# Patient Record
Sex: FEMALE | Race: BLACK OR AFRICAN AMERICAN | NOT HISPANIC OR LATINO | Employment: FULL TIME | URBAN - METROPOLITAN AREA
[De-identification: names, ages, dates, MRNs, and addresses within clinical notes are randomized per-mention and may not be internally consistent; named-entity substitution may affect disease eponyms.]

---

## 2022-02-05 ENCOUNTER — HOSPITAL ENCOUNTER (EMERGENCY)
Facility: HOSPITAL | Age: 32
Discharge: HOME | End: 2022-02-05
Attending: EMERGENCY MEDICINE
Payer: COMMERCIAL

## 2022-02-05 VITALS
TEMPERATURE: 99 F | RESPIRATION RATE: 18 BRPM | BODY MASS INDEX: 35.85 KG/M2 | HEART RATE: 83 BPM | OXYGEN SATURATION: 98 % | HEIGHT: 64 IN | WEIGHT: 210 LBS | DIASTOLIC BLOOD PRESSURE: 77 MMHG | SYSTOLIC BLOOD PRESSURE: 176 MMHG

## 2022-02-05 DIAGNOSIS — R06.02 SHORTNESS OF BREATH: ICD-10-CM

## 2022-02-05 DIAGNOSIS — J45.901 EXACERBATION OF ASTHMA, UNSPECIFIED ASTHMA SEVERITY, UNSPECIFIED WHETHER PERSISTENT: Primary | ICD-10-CM

## 2022-02-05 LAB
CTP QC/QA: YES
SARS-COV-2 RDRP RESP QL NAA+PROBE: NEGATIVE

## 2022-02-05 PROCEDURE — 99284 PR EMERGENCY DEPT VISIT,LEVEL IV: ICD-10-PCS | Mod: CS,,, | Performed by: PHYSICIAN ASSISTANT

## 2022-02-05 PROCEDURE — 94640 AIRWAY INHALATION TREATMENT: CPT

## 2022-02-05 PROCEDURE — U0002 COVID-19 LAB TEST NON-CDC: HCPCS | Performed by: PHYSICIAN ASSISTANT

## 2022-02-05 PROCEDURE — 25000242 PHARM REV CODE 250 ALT 637 W/ HCPCS: Performed by: PHYSICIAN ASSISTANT

## 2022-02-05 PROCEDURE — 93010 EKG 12-LEAD: ICD-10-PCS | Mod: ,,, | Performed by: STUDENT IN AN ORGANIZED HEALTH CARE EDUCATION/TRAINING PROGRAM

## 2022-02-05 PROCEDURE — 99285 EMERGENCY DEPT VISIT HI MDM: CPT | Mod: 25

## 2022-02-05 PROCEDURE — 93005 ELECTROCARDIOGRAM TRACING: CPT

## 2022-02-05 PROCEDURE — 94760 N-INVAS EAR/PLS OXIMETRY 1: CPT

## 2022-02-05 PROCEDURE — 93010 ELECTROCARDIOGRAM REPORT: CPT | Mod: ,,, | Performed by: STUDENT IN AN ORGANIZED HEALTH CARE EDUCATION/TRAINING PROGRAM

## 2022-02-05 PROCEDURE — 63600175 PHARM REV CODE 636 W HCPCS: Performed by: PHYSICIAN ASSISTANT

## 2022-02-05 PROCEDURE — 99284 EMERGENCY DEPT VISIT MOD MDM: CPT | Mod: CS,,, | Performed by: PHYSICIAN ASSISTANT

## 2022-02-05 RX ORDER — ALBUTEROL SULFATE 90 UG/1
AEROSOL, METERED RESPIRATORY (INHALATION)
COMMUNITY
Start: 2021-09-17 | End: 2022-02-05 | Stop reason: SDUPTHER

## 2022-02-05 RX ORDER — PREDNISONE 20 MG/1
40 TABLET ORAL DAILY
Qty: 8 TABLET | Refills: 0 | Status: SHIPPED | OUTPATIENT
Start: 2022-02-06 | End: 2022-02-10

## 2022-02-05 RX ORDER — ALBUTEROL SULFATE 90 UG/1
1-2 AEROSOL, METERED RESPIRATORY (INHALATION) EVERY 4 HOURS PRN
Qty: 18 G | Refills: 2 | Status: SHIPPED | OUTPATIENT
Start: 2022-02-05 | End: 2022-12-05 | Stop reason: SDUPTHER

## 2022-02-05 RX ORDER — ALBUTEROL SULFATE 2.5 MG/.5ML
2.5 SOLUTION RESPIRATORY (INHALATION) EVERY 4 HOURS PRN
Qty: 1 EACH | Refills: 2 | Status: ON HOLD | OUTPATIENT
Start: 2022-02-05 | End: 2022-02-24 | Stop reason: SDUPTHER

## 2022-02-05 RX ORDER — IPRATROPIUM BROMIDE AND ALBUTEROL SULFATE 2.5; .5 MG/3ML; MG/3ML
3 SOLUTION RESPIRATORY (INHALATION)
Status: COMPLETED | OUTPATIENT
Start: 2022-02-05 | End: 2022-02-05

## 2022-02-05 RX ORDER — PREDNISONE 20 MG/1
40 TABLET ORAL
Status: COMPLETED | OUTPATIENT
Start: 2022-02-05 | End: 2022-02-05

## 2022-02-05 RX ADMIN — IPRATROPIUM BROMIDE AND ALBUTEROL SULFATE 3 ML: .5; 2.5 SOLUTION RESPIRATORY (INHALATION) at 02:02

## 2022-02-05 RX ADMIN — PREDNISONE 40 MG: 20 TABLET ORAL at 02:02

## 2022-02-05 NOTE — ED TRIAGE NOTES
To the ED from  with c/o SOB for the past week, does have asthma. No relief provided with inhalers.

## 2022-02-05 NOTE — DISCHARGE INSTRUCTIONS
Diagnosis:  Asthma exacerbation    I suspect that your symptoms are due to an exacerbation of your asthma.  Your chest x-ray does not show any signs of pneumonia, your COVID-19 test was negative.  I am prescribing a course of steroids to help with inflammation as well as an inhaler and a nebulizer machine.    Please schedule a follow-up appointment with your primary care doctor.  If you start to have any worsening symptoms, please return to the emergency department.

## 2022-02-05 NOTE — ED NOTES
LOC: The patient is awake, alert, and oriented to self, place, time, and situation. Pt is calm and cooperative. Affect is appropriate.  Speech is appropriate and clear.     APPEARANCE: Patient resting uncomfortably, reporting SOB over the past week, hx asthma, in no acute distress.  Patient is clean and well groomed.    SKIN: The skin is warm and dry; color consistent with ethnicity.  Patient has normal skin turgor and moist mucus membranes.  Skin intact; no breakdown or bruising noted.     MUSCULOSKELETAL: Patient moving upper and lower extremities without difficulty; denies pain in the extremities or back.  Denies weakness.     RESPIRATORY: Airway is open and patent. Respirations spontaneous, even, easy, and non-labored.  Patient has a normal effort and rate.  No accessory muscle use noted. Occasional cough.  SOB with exertion and ambulation reported.     CARDIAC No peripheral edema noted. No complaints of chest pain.      ABDOMEN: Soft and non tender to palpation.  No distention noted. Pt denies abdominal pain; denies nausea, vomiting, diarrhea, or constipation.    NEUROLOGIC: Eyes open spontaneously.  Behavior appropriate to situation.  Follows commands; facial expression symmetrical.  Purposeful motor response noted; normal sensation in all extremities. Pt denies headache; denies lightheadedness or dizziness; denies visual disturbances; denies loss of balance; denies unilateral weakness.

## 2022-02-05 NOTE — Clinical Note
"Molly "Todd Aly was seen and treated in our emergency department on 2/5/2022.     COVID-19 is present in our communities across the state. There is limited testing for COVID at this time, so not all patients can be tested. In this situation, your employee meets the following criteria:    Molly Aly has met the criteria for COVID-19 testing and has a NEGATIVE result. The employee can return to work once they are asymptomatic for 24 hours without the use of fever reducing medications (Tylenol, Motrin, etc).     If the employee is not fully vaccinated and had a close contact:  · Retest at 5 to 7 days post-exposure  · If possible, it is recommended that they quarantine for 5 days from the time of contact regardless of their test status.  · A mask should be worn post quarantine for 5 days.    If you have any questions or concerns, or if I can be of further assistance, please do not hesitate to contact me.    Sincerely,             Alberta Yanes PA-C"

## 2022-02-05 NOTE — ED PROVIDER NOTES
Encounter Date: 2/5/2022       History     Chief Complaint   Patient presents with    Cough     Patient reports cough with shortness of breath x one week. Patient is non-labored in triage. Patient denies chest pain, but does reports shortness of breath is worse on exertion. Patient is A&Ox4 and following commands.      31-year-old female with asthma presents for shortness of breath and cough for 1 week.  She has been using her home inhaler and used up the entire cartridge without significant improvement of her symptoms.  Cough is nonproductive, she reports associated wheezing, chest tightness and chest pain with coughing.  She denies fever/chills, myalgias, leg pain or swelling nausea/vomiting/diarrhea or other complaints.  No sick contacts.  She reports prior hospitalization for asthma many years ago, no history of intubation for asthma.        Review of patient's allergies indicates:  No Known Allergies  Past Medical History:   Diagnosis Date    Asthma      History reviewed. No pertinent surgical history.  History reviewed. No pertinent family history.  Social History     Tobacco Use    Smoking status: Light Tobacco Smoker     Types: Cigars    Smokeless tobacco: Never Used   Substance Use Topics    Alcohol use: Yes    Drug use: Not Currently     Review of Systems   Constitutional: Negative for fever.   HENT: Negative for sore throat.    Respiratory: Positive for cough, chest tightness, shortness of breath and wheezing. Negative for apnea, choking and stridor.    Cardiovascular: Positive for chest pain. Negative for palpitations and leg swelling.   Gastrointestinal: Negative for nausea.   Genitourinary: Negative for dysuria.   Musculoskeletal: Negative for back pain.   Skin: Negative for rash.   Neurological: Negative for weakness.   Hematological: Does not bruise/bleed easily.       Physical Exam     Initial Vitals [02/05/22 1231]   BP Pulse Resp Temp SpO2   (!) 176/77 95 19 99.4 °F (37.4 °C) 100 %      MAP        --         Physical Exam    Nursing note and vitals reviewed.  Constitutional: She appears well-developed and well-nourished.   HENT:   Head: Normocephalic and atraumatic.   Eyes: EOM are normal. Pupils are equal, round, and reactive to light.   Neck: Neck supple.   Normal range of motion.  Cardiovascular: Normal rate, regular rhythm, normal heart sounds and intact distal pulses. Exam reveals no gallop and no friction rub.    No murmur heard.  No lower extremity edema or tenderness   Pulmonary/Chest: No tachypnea. No respiratory distress. She has no decreased breath sounds. She has wheezes. She has no rhonchi. She has no rales. She exhibits no tenderness.   Normal work of breathing diffuse expiratory wheezes, decreased air movement   Musculoskeletal:         General: Normal range of motion.      Cervical back: Normal range of motion and neck supple.     Neurological: She is alert and oriented to person, place, and time.   Skin: Skin is warm and dry.   Psychiatric: She has a normal mood and affect.         ED Course   Procedures  Labs Reviewed   SARS-COV-2 RDRP GENE    Narrative:     This test utilizes isothermal nucleic acid amplification   technology to detect the SARS-CoV-2 RdRp nucleic acid segment.   The analytical sensitivity (limit of detection) is 125 genome   equivalents/mL.   A POSITIVE result implies infection with the SARS-CoV-2 virus;   the patient is presumed to be contagious.     A NEGATIVE result means that SARS-CoV-2 nucleic acids are not   present above the limit of detection. A NEGATIVE result should be   treated as presumptive. It does not rule out the possibility of   COVID-19 and should not be the sole basis for treatment decisions.   If COVID-19 is strongly suspected based on clinical and exposure   history, re-testing using an alternate molecular assay should be   considered.   This test is only for use under the Food and Drug   Administration s Emergency Use Authorization (EUA).    Commercial kits are provided by [employer].   Performance characteristics of the EUA have been independently   verified by [facility] Department of   Pathology and Laboratory Medicine.   _________________________________________________________________   The authorized Fact Sheet for Healthcare Providers and the authorized Fact   Sheet for Patients of the ID NOW COVID-19 are available on the FDA   website:     https://www.fda.gov/media/094761/download  https://www.fda.gov/media/273827/download           EKG Readings: (Independently Interpreted)   Initial Reading: No STEMI. Rhythm: Normal Sinus Rhythm. Heart Rate: 80. Ectopy: No Ectopy. ST Segments: Normal ST Segments. Clinical Impression: Normal Sinus Rhythm       Imaging Results          X-Ray Chest PA And Lateral (Final result)  Result time 02/05/22 15:08:59    Final result by Jai Lancaster MD (02/05/22 15:08:59)                 Impression:      1. No acute cardiopulmonary process.      Electronically signed by: Jai Lancaster MD  Date:    02/05/2022  Time:    15:08             Narrative:    EXAMINATION:  XR CHEST PA AND LATERAL    CLINICAL HISTORY:  Shortness of breath    TECHNIQUE:  PA and lateral views of the chest were performed.    COMPARISON:  None    FINDINGS:  The cardiomediastinal silhouette is not enlarged.  There is no pleural effusion.  The trachea is midline.  The lungs are symmetrically expanded bilaterally without evidence of acute parenchymal process. No large focal consolidation seen.  There is no pneumothorax.  The osseous structures are unremarkable.                                 Medications   predniSONE tablet 40 mg (40 mg Oral Given 2/5/22 1406)   albuterol-ipratropium 2.5 mg-0.5 mg/3 mL nebulizer solution 3 mL (3 mLs Nebulization Given 2/5/22 1402)     Medical Decision Making:   Initial Assessment:   31-year-old female presenting for cough, shortness of breath and wheezing for 1 week.  She is hypertensive 176/77  with otherwise normal vitals.  Differential Diagnosis:   Asthma exacerbation  COVID-19   Pneumonia  Think cardiac cause is unlikely    Independently Interpreted Test(s):   I have ordered and independently interpreted X-rays - see summary below.       <> Summary of X-Ray Reading(s): No no consolidation or pulmonary edema  I have ordered and independently interpreted EKG Reading(s) - see prior notes  Clinical Tests:   Lab Tests: Ordered and Reviewed  Radiological Study: Ordered and Reviewed  Medical Tests: Ordered and Reviewed  ED Management:  COVID-19 test is negative.  Will give duo nebs, prednisone, do chest x-ray and reassess.    Patient reports improvement of her shortness of breath DuoNebs.  Lungs with significantly less wheezing on auscultation.  Chest x-ray without signs of pneumonia.  Will treat for asthma exacerbation with prednisone, DuoNebs and instruct patient to follow up with PCP return to the ED for any worsening symptoms.  Referral information provided for primary care clinics. Stressed the importance of follow-up, strict ED return precautions given.  Patient voiced understanding and is comfortable with discharge.              ED Course as of 02/05/22 1853   Sat Feb 05, 2022   1341 SARS-CoV-2 RNA, Amplification, Qual: Negative [CC]   1514 X-Ray Chest PA And Lateral [CC]      ED Course User Index  [CC] Alberta Yanes PA-C             Clinical Impression:   Final diagnoses:  [R06.02] Shortness of breath  [J45.901] Exacerbation of asthma, unspecified asthma severity, unspecified whether persistent (Primary)          ED Disposition Condition    Discharge Stable        ED Prescriptions     Medication Sig Dispense Start Date End Date Auth. Provider    albuterol (PROVENTIL/VENTOLIN HFA) 90 mcg/actuation inhaler Inhale 1-2 puffs into the lungs every 4 (four) hours as needed for Wheezing or Shortness of Breath. Rescue 18 g 2/5/2022  Alberta Yanes PA-C    albuterol sulfate 2.5 mg/0.5 mL Nebu Take  2.5 mg by nebulization every 4 (four) hours as needed. Rescue 1 each 2/5/2022 2/5/2023 Alberta Yanes PA-C    predniSONE (DELTASONE) 20 MG tablet Take 2 tablets (40 mg total) by mouth once daily. for 4 days 8 tablet 2/6/2022 2/10/2022 Alberta Yanes PA-C        Follow-up Information     Follow up With Specialties Details Why Contact [facility] Family Medicine Family Medicine Schedule an appointment as soon as possible for a visit in 1 week  [address] [facility] [address]  [phone]      [facility] - Emergency Dept Emergency Medicine Go to  If symptoms worsen [address] [facility] [id number] [phone]           Alberta Yanes PA-C  02/05/22 2632

## 2022-02-23 ENCOUNTER — PATIENT OUTREACH (OUTPATIENT)
Dept: EMERGENCY MEDICINE | Facility: HOSPITAL | Age: 32
End: 2022-02-23

## 2022-02-23 ENCOUNTER — HOSPITAL ENCOUNTER (OUTPATIENT)
Facility: HOSPITAL | Age: 32
Discharge: HOME | End: 2022-02-24
Attending: EMERGENCY MEDICINE | Admitting: STUDENT IN AN ORGANIZED HEALTH CARE EDUCATION/TRAINING PROGRAM
Payer: COMMERCIAL

## 2022-02-23 DIAGNOSIS — R07.9 CHEST PAIN: ICD-10-CM

## 2022-02-23 DIAGNOSIS — J45.901 EXACERBATION OF ASTHMA, UNSPECIFIED ASTHMA SEVERITY, UNSPECIFIED WHETHER PERSISTENT: Primary | ICD-10-CM

## 2022-02-23 DIAGNOSIS — I10 HYPERTENSION, UNSPECIFIED TYPE: ICD-10-CM

## 2022-02-23 LAB
ALBUMIN SERPL BCP-MCNC: 4 G/DL (ref 3.5–5.2)
ALP SERPL-CCNC: 62 U/L (ref 55–135)
ALT SERPL W/O P-5'-P-CCNC: 10 U/L (ref 10–44)
ANION GAP SERPL CALC-SCNC: 12 MMOL/L (ref 8–16)
AST SERPL-CCNC: 18 U/L (ref 10–40)
B-HCG UR QL: NEGATIVE
BASOPHILS # BLD AUTO: 0.06 K/UL (ref 0–0.2)
BASOPHILS NFR BLD: 0.7 % (ref 0–1.9)
BILIRUB SERPL-MCNC: 0.5 MG/DL (ref 0.1–1)
BUN SERPL-MCNC: 10 MG/DL (ref 6–20)
CALCIUM SERPL-MCNC: 9.8 MG/DL (ref 8.7–10.5)
CHLORIDE SERPL-SCNC: 104 MMOL/L (ref 95–110)
CO2 SERPL-SCNC: 24 MMOL/L (ref 23–29)
CREAT SERPL-MCNC: 0.8 MG/DL (ref 0.5–1.4)
CTP QC/QA: YES
CTP QC/QA: YES
DIFFERENTIAL METHOD: ABNORMAL
EOSINOPHIL # BLD AUTO: 0.4 K/UL (ref 0–0.5)
EOSINOPHIL NFR BLD: 4.5 % (ref 0–8)
ERYTHROCYTE [DISTWIDTH] IN BLOOD BY AUTOMATED COUNT: 15.6 % (ref 11.5–14.5)
EST. GFR  (AFRICAN AMERICAN): >60 ML/MIN/1.73 M^2
EST. GFR  (NON AFRICAN AMERICAN): >60 ML/MIN/1.73 M^2
GLUCOSE SERPL-MCNC: 118 MG/DL (ref 70–110)
HCT VFR BLD AUTO: 36.1 % (ref 37–48.5)
HGB BLD-MCNC: 11.1 G/DL (ref 12–16)
IMM GRANULOCYTES # BLD AUTO: 0.01 K/UL (ref 0–0.04)
IMM GRANULOCYTES NFR BLD AUTO: 0.1 % (ref 0–0.5)
LYMPHOCYTES # BLD AUTO: 1.8 K/UL (ref 1–4.8)
LYMPHOCYTES NFR BLD: 19.7 % (ref 18–48)
MCH RBC QN AUTO: 25.4 PG (ref 27–31)
MCHC RBC AUTO-ENTMCNC: 30.7 G/DL (ref 32–36)
MCV RBC AUTO: 83 FL (ref 82–98)
MONOCYTES # BLD AUTO: 0.7 K/UL (ref 0.3–1)
MONOCYTES NFR BLD: 7.8 % (ref 4–15)
NEUTROPHILS # BLD AUTO: 6 K/UL (ref 1.8–7.7)
NEUTROPHILS NFR BLD: 67.2 % (ref 38–73)
NRBC BLD-RTO: 0 /100 WBC
PLATELET # BLD AUTO: 264 K/UL (ref 150–450)
PMV BLD AUTO: 10 FL (ref 9.2–12.9)
POTASSIUM SERPL-SCNC: 4.1 MMOL/L (ref 3.5–5.1)
PROT SERPL-MCNC: 8.8 G/DL (ref 6–8.4)
RBC # BLD AUTO: 4.37 M/UL (ref 4–5.4)
SARS-COV-2 RDRP RESP QL NAA+PROBE: NEGATIVE
SODIUM SERPL-SCNC: 140 MMOL/L (ref 136–145)
WBC # BLD AUTO: 8.97 K/UL (ref 3.9–12.7)

## 2022-02-23 PROCEDURE — 94761 N-INVAS EAR/PLS OXIMETRY MLT: CPT

## 2022-02-23 PROCEDURE — 63600175 PHARM REV CODE 636 W HCPCS: Performed by: HOSPITALIST

## 2022-02-23 PROCEDURE — 96366 THER/PROPH/DIAG IV INF ADDON: CPT

## 2022-02-23 PROCEDURE — 80053 COMPREHEN METABOLIC PANEL: CPT | Performed by: EMERGENCY MEDICINE

## 2022-02-23 PROCEDURE — 99220 PR INITIAL OBSERVATION CARE,LEVL III: ICD-10-PCS | Mod: ,,, | Performed by: HOSPITALIST

## 2022-02-23 PROCEDURE — 94640 AIRWAY INHALATION TREATMENT: CPT

## 2022-02-23 PROCEDURE — 99285 EMERGENCY DEPT VISIT HI MDM: CPT | Mod: CS,,, | Performed by: EMERGENCY MEDICINE

## 2022-02-23 PROCEDURE — 96365 THER/PROPH/DIAG IV INF INIT: CPT

## 2022-02-23 PROCEDURE — G0378 HOSPITAL OBSERVATION PER HR: HCPCS

## 2022-02-23 PROCEDURE — 87389 HIV-1 AG W/HIV-1&-2 AB AG IA: CPT | Performed by: EMERGENCY MEDICINE

## 2022-02-23 PROCEDURE — 99900031 HC PATIENT EDUCATION (STAT)

## 2022-02-23 PROCEDURE — 25000242 PHARM REV CODE 250 ALT 637 W/ HCPCS: Performed by: EMERGENCY MEDICINE

## 2022-02-23 PROCEDURE — 81025 URINE PREGNANCY TEST: CPT | Performed by: EMERGENCY MEDICINE

## 2022-02-23 PROCEDURE — 86803 HEPATITIS C AB TEST: CPT | Performed by: EMERGENCY MEDICINE

## 2022-02-23 PROCEDURE — 99285 EMERGENCY DEPT VISIT HI MDM: CPT | Mod: 25

## 2022-02-23 PROCEDURE — 96375 TX/PRO/DX INJ NEW DRUG ADDON: CPT

## 2022-02-23 PROCEDURE — U0002 COVID-19 LAB TEST NON-CDC: HCPCS | Performed by: EMERGENCY MEDICINE

## 2022-02-23 PROCEDURE — 27000221 HC OXYGEN, UP TO 24 HOURS

## 2022-02-23 PROCEDURE — 25000242 PHARM REV CODE 250 ALT 637 W/ HCPCS: Performed by: STUDENT IN AN ORGANIZED HEALTH CARE EDUCATION/TRAINING PROGRAM

## 2022-02-23 PROCEDURE — 25000242 PHARM REV CODE 250 ALT 637 W/ HCPCS: Performed by: HOSPITALIST

## 2022-02-23 PROCEDURE — 99220 PR INITIAL OBSERVATION CARE,LEVL III: CPT | Mod: ,,, | Performed by: HOSPITALIST

## 2022-02-23 PROCEDURE — 25000003 PHARM REV CODE 250: Performed by: HOSPITALIST

## 2022-02-23 PROCEDURE — 85025 COMPLETE CBC W/AUTO DIFF WBC: CPT | Performed by: EMERGENCY MEDICINE

## 2022-02-23 PROCEDURE — 99285 PR EMERGENCY DEPT VISIT,LEVEL V: ICD-10-PCS | Mod: CS,,, | Performed by: EMERGENCY MEDICINE

## 2022-02-23 PROCEDURE — 63600175 PHARM REV CODE 636 W HCPCS: Performed by: EMERGENCY MEDICINE

## 2022-02-23 PROCEDURE — 94760 N-INVAS EAR/PLS OXIMETRY 1: CPT

## 2022-02-23 RX ORDER — IPRATROPIUM BROMIDE 0.5 MG/2.5ML
1 SOLUTION RESPIRATORY (INHALATION)
Status: COMPLETED | OUTPATIENT
Start: 2022-02-23 | End: 2022-02-23

## 2022-02-23 RX ORDER — IBUPROFEN 200 MG
24 TABLET ORAL
Status: DISCONTINUED | OUTPATIENT
Start: 2022-02-23 | End: 2022-02-24 | Stop reason: HOSPADM

## 2022-02-23 RX ORDER — GLUCAGON 1 MG
1 KIT INJECTION
Status: DISCONTINUED | OUTPATIENT
Start: 2022-02-23 | End: 2022-02-24 | Stop reason: HOSPADM

## 2022-02-23 RX ORDER — MAG HYDROX/ALUMINUM HYD/SIMETH 200-200-20
30 SUSPENSION, ORAL (FINAL DOSE FORM) ORAL 4 TIMES DAILY PRN
Status: DISCONTINUED | OUTPATIENT
Start: 2022-02-23 | End: 2022-02-24 | Stop reason: HOSPADM

## 2022-02-23 RX ORDER — FLUTICASONE FUROATE AND VILANTEROL 200; 25 UG/1; UG/1
1 POWDER RESPIRATORY (INHALATION) DAILY
Status: DISCONTINUED | OUTPATIENT
Start: 2022-02-23 | End: 2022-02-24 | Stop reason: HOSPADM

## 2022-02-23 RX ORDER — NALOXONE HCL 0.4 MG/ML
0.02 VIAL (ML) INJECTION
Status: DISCONTINUED | OUTPATIENT
Start: 2022-02-23 | End: 2022-02-24 | Stop reason: HOSPADM

## 2022-02-23 RX ORDER — SODIUM CHLORIDE 0.9 % (FLUSH) 0.9 %
10 SYRINGE (ML) INJECTION EVERY 12 HOURS PRN
Status: DISCONTINUED | OUTPATIENT
Start: 2022-02-23 | End: 2022-02-24 | Stop reason: HOSPADM

## 2022-02-23 RX ORDER — METHYLPREDNISOLONE SOD SUCC 125 MG
125 VIAL (EA) INJECTION
Status: COMPLETED | OUTPATIENT
Start: 2022-02-23 | End: 2022-02-23

## 2022-02-23 RX ORDER — MONTELUKAST SODIUM 10 MG/1
10 TABLET ORAL DAILY
Status: DISCONTINUED | OUTPATIENT
Start: 2022-02-23 | End: 2022-02-24 | Stop reason: HOSPADM

## 2022-02-23 RX ORDER — IBUPROFEN 200 MG
16 TABLET ORAL
Status: DISCONTINUED | OUTPATIENT
Start: 2022-02-23 | End: 2022-02-24 | Stop reason: HOSPADM

## 2022-02-23 RX ORDER — ALBUTEROL SULFATE 2.5 MG/.5ML
15 SOLUTION RESPIRATORY (INHALATION)
Status: COMPLETED | OUTPATIENT
Start: 2022-02-23 | End: 2022-02-23

## 2022-02-23 RX ORDER — ONDANSETRON 2 MG/ML
4 INJECTION INTRAMUSCULAR; INTRAVENOUS EVERY 8 HOURS PRN
Status: DISCONTINUED | OUTPATIENT
Start: 2022-02-23 | End: 2022-02-24 | Stop reason: HOSPADM

## 2022-02-23 RX ORDER — ACETAMINOPHEN 325 MG/1
650 TABLET ORAL EVERY 4 HOURS PRN
Status: DISCONTINUED | OUTPATIENT
Start: 2022-02-23 | End: 2022-02-24 | Stop reason: HOSPADM

## 2022-02-23 RX ORDER — ALBUTEROL SULFATE 2.5 MG/.5ML
2.5 SOLUTION RESPIRATORY (INHALATION) EVERY 4 HOURS PRN
Status: DISCONTINUED | OUTPATIENT
Start: 2022-02-23 | End: 2022-02-24 | Stop reason: HOSPADM

## 2022-02-23 RX ORDER — TALC
6 POWDER (GRAM) TOPICAL NIGHTLY PRN
Status: DISCONTINUED | OUTPATIENT
Start: 2022-02-23 | End: 2022-02-24 | Stop reason: HOSPADM

## 2022-02-23 RX ORDER — MAGNESIUM SULFATE HEPTAHYDRATE 40 MG/ML
2 INJECTION, SOLUTION INTRAVENOUS
Status: COMPLETED | OUTPATIENT
Start: 2022-02-23 | End: 2022-02-23

## 2022-02-23 RX ORDER — IPRATROPIUM BROMIDE AND ALBUTEROL SULFATE 2.5; .5 MG/3ML; MG/3ML
3 SOLUTION RESPIRATORY (INHALATION)
Status: DISCONTINUED | OUTPATIENT
Start: 2022-02-23 | End: 2022-02-24 | Stop reason: HOSPADM

## 2022-02-23 RX ADMIN — PREDNISONE 50 MG: 20 TABLET ORAL at 08:02

## 2022-02-23 RX ADMIN — IPRATROPIUM BROMIDE 1 MG: 0.5 SOLUTION RESPIRATORY (INHALATION) at 03:02

## 2022-02-23 RX ADMIN — ALBUTEROL SULFATE 15 MG: 2.5 SOLUTION RESPIRATORY (INHALATION) at 03:02

## 2022-02-23 RX ADMIN — METHYLPREDNISOLONE SODIUM SUCCINATE 125 MG: 125 INJECTION, POWDER, FOR SOLUTION INTRAMUSCULAR; INTRAVENOUS at 04:02

## 2022-02-23 RX ADMIN — IPRATROPIUM BROMIDE AND ALBUTEROL SULFATE 3 ML: 2.5; .5 SOLUTION RESPIRATORY (INHALATION) at 07:02

## 2022-02-23 RX ADMIN — MAGNESIUM SULFATE IN WATER 2 G: 40 INJECTION, SOLUTION INTRAVENOUS at 04:02

## 2022-02-23 RX ADMIN — MONTELUKAST 10 MG: 10 TABLET, FILM COATED ORAL at 08:02

## 2022-02-23 RX ADMIN — FLUTICASONE FUROATE AND VILANTEROL TRIFENATATE 1 PUFF: 200; 25 POWDER RESPIRATORY (INHALATION) at 11:02

## 2022-02-23 RX ADMIN — IPRATROPIUM BROMIDE AND ALBUTEROL SULFATE 3 ML: 2.5; .5 SOLUTION RESPIRATORY (INHALATION) at 11:02

## 2022-02-23 RX ADMIN — IPRATROPIUM BROMIDE AND ALBUTEROL SULFATE 3 ML: 2.5; .5 SOLUTION RESPIRATORY (INHALATION) at 04:02

## 2022-02-23 NOTE — ED TRIAGE NOTES
Pt c/o SOB x1 month. Audible wheezing heard. Hx of asthma. Took home meds w/o relief. Placed on 2L NC in triage.

## 2022-02-23 NOTE — DISCHARGE INSTRUCTIONS
"Your blood pressure was elevated in the emergency department.  You must follow-up with your primary care doctor for adjustment of your medicine.    You were seen in the emergency department for difficulty breathing related to asthma ("asthma attack" or "asthma exacerbation").    Diagnosis: asthma exacerbation    Return to the emergency department if you have signs of severe difficulty breathing including:   - Breathing too fast  - Not able to say more than 2-3 words at a time without taking a breath  - Blue-collette or gray/dusky color of the face, lips or fingers  - Muscles pulling in around the neck or ribs    Return to the emergency department at any time if you think that you are getting worse.    Tests today showed:   Labs Reviewed   HIV 1 / 2 ANTIBODY   HEPATITIS C ANTIBODY     Imaging Results    None         Treatments you had today:   Medications - No data to display    Home Care Instructions:  - Take albuterol (inhaler 2 - 4 puffs or nebulizer) every 4 hours as needed for wheezing or difficulty breathing  - Take the prescribed steroid as directed  - Continue taking other home medications as previously prescribed    Follow-Up Plan:  - Follow-up with: Primary care doctor within 2 - 3 days  - Additional outpatient testing and/or evaluation as directed by your doctor  "

## 2022-02-23 NOTE — ASSESSMENT & PLAN NOTE
-likely triggered by environmental dust as patient's childhood asthma were well controlled until after December when she moved to a new apartment and having renovation construction   -discharged from ED on 2/5 on short course of steroid and albuterol inhaler   -returned to ED last night with sudden onset chest tightness, sob and wheezing   -clinically improved with IV solumedrol, nebs and magnesium   -oxygenating well on 2 Liter and not in distress   -will start on prednisone, nebs and singulair   -advised to avoid environmental triggers that might exacerbate asthma   -needs advair and albuterol inhaler at discharge   -need outpatient referral for community clinic at discharge as patient does not have PCP

## 2022-02-23 NOTE — SUBJECTIVE & OBJECTIVE
Past Medical History:   Diagnosis Date    Asthma        History reviewed. No pertinent surgical history.    Review of patient's allergies indicates:  No Known Allergies    No current facility-administered medications on file prior to encounter.     Current Outpatient Medications on File Prior to Encounter   Medication Sig    albuterol (PROVENTIL/VENTOLIN HFA) 90 mcg/actuation inhaler Inhale 1-2 puffs into the lungs every 4 (four) hours as needed for Wheezing or Shortness of Breath. Rescue    albuterol sulfate 2.5 mg/0.5 mL Nebu Take 2.5 mg by nebulization every 4 (four) hours as needed. Rescue     Family History    None       Tobacco Use    Smoking status: Light Tobacco Smoker     Types: Cigars    Smokeless tobacco: Never Used   Substance and Sexual Activity    Alcohol use: Yes    Drug use: Not Currently    Sexual activity: Not on file     Review of Systems   Constitutional:  Negative for activity change, appetite change, chills, diaphoresis, fatigue and fever.   HENT:  Negative for congestion, dental problem, drooling, ear discharge, ear pain, facial swelling, hearing loss, mouth sores, nosebleeds, postnasal drip, rhinorrhea, sinus pressure, sneezing, sore throat, tinnitus, trouble swallowing and voice change.    Eyes:  Negative for photophobia, pain, discharge, redness, itching and visual disturbance.   Respiratory:  Positive for cough, shortness of breath and wheezing. Negative for apnea, choking, chest tightness and stridor.    Cardiovascular:  Negative for chest pain, palpitations and leg swelling.   Gastrointestinal:  Negative for abdominal distention, abdominal pain, anal bleeding, blood in stool, constipation, diarrhea, nausea, rectal pain and vomiting.   Endocrine: Negative for cold intolerance, heat intolerance, polydipsia, polyphagia and polyuria.   Genitourinary:  Negative for decreased urine volume, difficulty urinating, dyspareunia, dysuria, enuresis, flank pain, frequency, genital sores, hematuria,  menstrual problem, pelvic pain, urgency, vaginal bleeding, vaginal discharge and vaginal pain.   Musculoskeletal:  Negative for arthralgias, back pain, gait problem, joint swelling, myalgias, neck pain and neck stiffness.   Skin:  Negative for color change, pallor, rash and wound.   Allergic/Immunologic: Negative for environmental allergies, food allergies and immunocompromised state.   Neurological:  Negative for dizziness, tremors, seizures, syncope, facial asymmetry, speech difficulty, weakness, light-headedness, numbness and headaches.   Hematological:  Negative for adenopathy. Does not bruise/bleed easily.   Psychiatric/Behavioral:  Negative for agitation, behavioral problems, confusion, decreased concentration, dysphoric mood, hallucinations, self-injury, sleep disturbance and suicidal ideas. The patient is not nervous/anxious and is not hyperactive.    Objective:     Vital Signs (Most Recent):  Temp: 99.3 °F (37.4 °C) (02/23/22 0052)  Pulse: 105 (02/23/22 0338)  Resp: (!) 22 (02/23/22 0338)  BP: (!) 147/118 (02/23/22 0052)  SpO2: 99 % (02/23/22 0338)   Vital Signs (24h Range):  Temp:  [99.3 °F (37.4 °C)] 99.3 °F (37.4 °C)  Pulse:  [105] 105  Resp:  [22] 22  SpO2:  [95 %-99 %] 99 %  BP: (147)/(118) 147/118        There is no height or weight on file to calculate BMI.    Physical Exam  Constitutional:       General: She is not in acute distress.     Appearance: She is well-developed. She is not diaphoretic.   HENT:      Head: Normocephalic and atraumatic.      Right Ear: External ear normal.      Left Ear: External ear normal.      Nose: Nose normal.      Mouth/Throat:      Pharynx: No oropharyngeal exudate.   Eyes:      General: No scleral icterus.     Extraocular Movements: EOM normal.      Conjunctiva/sclera: Conjunctivae normal.      Pupils: Pupils are equal, round, and reactive to light.   Neck:      Thyroid: No thyromegaly.      Vascular: No JVD.      Trachea: No tracheal deviation.   Cardiovascular:       Rate and Rhythm: Normal rate and regular rhythm.      Heart sounds: Normal heart sounds. No murmur heard.    No gallop.   Pulmonary:      Effort: Pulmonary effort is normal. No respiratory distress.      Breath sounds: Wheezing (bialteral expiratory wheezing) present. No rales.   Chest:      Chest wall: No tenderness.   Abdominal:      General: Bowel sounds are normal. There is no distension.      Palpations: Abdomen is soft. There is no mass.      Tenderness: There is no abdominal tenderness. There is no guarding or rebound.   Genitourinary:     Vagina: No vaginal discharge.   Musculoskeletal:         General: No tenderness.      Cervical back: Neck supple.   Lymphadenopathy:      Cervical: No cervical adenopathy.   Skin:     General: Skin is warm and dry.      Coloration: Skin is not pale.      Findings: No erythema or rash.   Neurological:      Mental Status: She is alert and oriented to person, place, and time.      Cranial Nerves: No cranial nerve deficit.      Motor: No abnormal muscle tone.      Coordination: Coordination normal.      Deep Tendon Reflexes: Reflexes are normal and symmetric. Reflexes normal.   Psychiatric:         Behavior: Behavior normal.         Thought Content: Thought content normal.         Judgment: Judgment normal.         CRANIAL NERVES     CN III, IV, VI   Pupils are equal, round, and reactive to light.  Extraocular motions are normal.      Significant Labs:   Recent Results (from the past 24 hour(s))   CBC auto differential    Collection Time: 02/23/22  3:34 AM   Result Value Ref Range    WBC 8.97 3.90 - 12.70 K/uL    RBC 4.37 4.00 - 5.40 M/uL    Hemoglobin 11.1 (L) 12.0 - 16.0 g/dL    Hematocrit 36.1 (L) 37.0 - 48.5 %    MCV 83 82 - 98 fL    MCH 25.4 (L) 27.0 - 31.0 pg    MCHC 30.7 (L) 32.0 - 36.0 g/dL    RDW 15.6 (H) 11.5 - 14.5 %    Platelets 264 150 - 450 K/uL    MPV 10.0 9.2 - 12.9 fL    Immature Granulocytes 0.1 0.0 - 0.5 %    Gran # (ANC) 6.0 1.8 - 7.7 K/uL    Immature Grans  (Abs) 0.01 0.00 - 0.04 K/uL    Lymph # 1.8 1.0 - 4.8 K/uL    Mono # 0.7 0.3 - 1.0 K/uL    Eos # 0.4 0.0 - 0.5 K/uL    Baso # 0.06 0.00 - 0.20 K/uL    nRBC 0 0 /100 WBC    Gran % 67.2 38.0 - 73.0 %    Lymph % 19.7 18.0 - 48.0 %    Mono % 7.8 4.0 - 15.0 %    Eosinophil % 4.5 0.0 - 8.0 %    Basophil % 0.7 0.0 - 1.9 %    Differential Method Automated    Comprehensive metabolic panel    Collection Time: 02/23/22  3:34 AM   Result Value Ref Range    Sodium 140 136 - 145 mmol/L    Potassium 4.1 3.5 - 5.1 mmol/L    Chloride 104 95 - 110 mmol/L    CO2 24 23 - 29 mmol/L    Glucose 118 (H) 70 - 110 mg/dL    BUN 10 6 - 20 mg/dL    Creatinine 0.8 0.5 - 1.4 mg/dL    Calcium 9.8 8.7 - 10.5 mg/dL    Total Protein 8.8 (H) 6.0 - 8.4 g/dL    Albumin 4.0 3.5 - 5.2 g/dL    Total Bilirubin 0.5 0.1 - 1.0 mg/dL    Alkaline Phosphatase 62 55 - 135 U/L    AST 18 10 - 40 U/L    ALT 10 10 - 44 U/L    Anion Gap 12 8 - 16 mmol/L    eGFR if African American >60.0 >60 mL/min/1.73 m^2    eGFR if non African American >60.0 >60 mL/min/1.73 m^2   POCT COVID-19 Rapid Screening    Collection Time: 02/23/22  3:53 AM   Result Value Ref Range    POC Rapid COVID Negative Negative     Acceptable Yes    POCT urine pregnancy    Collection Time: 02/23/22  5:32 AM   Result Value Ref Range    POC Preg Test, Ur Negative Negative     Acceptable Yes          Significant Imaging: I have reviewed all pertinent imaging results/findings within the past 24 hours.

## 2022-02-23 NOTE — HPI
Ms. Aly is a 31 y.o. female with history of childhood asthma presenting to emergency department with complaint of shortness of breath.      Per chart review, patient seen in this emergency department on 02/05/2022 with complaint of shortness of breath and cough. She was sent home on short course of steroid, albuterol inhaler and advised to follow up with PCP. Patient states she felt better for few days after going home. Patient reports sudden onset chest tightness, wheezing and SOB last night which prompted her to come to ED. Patient states her childhood asthma has been well controlled until few weeks ago when she started with asthma flare ups. Patient thinks recent renovation construction and outside her apartment likely contributed to her recent asthma exacerbation. Patient notes she moved current apartment in December and a month later she started having asthma symptoms. She denies any other changes. Denies fever, chills, cough, chest pain, palpitation, N/V/D/abdominal pain, urinary complaints, focal weakness or numbness. She denies tobacco, alcohol or other illicit drug use.  She does not have a primary care doctor. 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      Patient received nebs, solumedrol and magnesium in ED with some clinical improvement.

## 2022-02-23 NOTE — H&P
[facility] Emergency [facility]  [facility]  History & Physical    Patient Name: Molly Aly  MRN: [id number]  Patient Class: OP- Observation  Admission Date: 2/23/2022  Attending Physician: Ailyn Ingram MD   Primary Care Provider: Primary Doctor No         Patient information was obtained from patient and ER records.     Subjective:     Principal Problem:Moderate asthma with acute exacerbation    Chief Complaint:   Chief Complaint   Patient presents with    Shortness of Breath     Hx of asthma. Recently given home meds to help but not having any relief.         HPI: Ms. Aly is a 31 y.o. female with history of childhood asthma presenting to emergency department with complaint of shortness of breath.      Per chart review, patient seen in this emergency department on 02/05/2022 with complaint of shortness of breath and cough. She was sent home on short course of steroid, albuterol inhaler and advised to follow up with PCP. Patient states she felt better for few days after going home. Patient reports sudden onset chest tightness, wheezing and SOB last night which prompted her to come to ED. Patient states her childhood asthma has been well controlled until few weeks ago when she started with asthma flare ups. Patient thinks recent renovation construction and outside her apartment likely contributed to her recent asthma exacerbation. Patient notes she moved current apartment in December and a month later she started having asthma symptoms. She denies any other changes. Denies fever, chills, cough, chest pain, palpitation, N/V/D/abdominal pain, urinary complaints, focal weakness or numbness. She denies tobacco, alcohol or other illicit drug use.  She does not have a primary care doctor. Patient works as hotel security.      Patient received nebs, solumedrol and magnesium in ED with some clinical improvement.         Past Medical History:   Diagnosis Date    Asthma        History reviewed. No pertinent surgical  history.    Review of patient's allergies indicates:  No Known Allergies    No current facility-administered medications on file prior to encounter.     Current Outpatient Medications on File Prior to Encounter   Medication Sig    albuterol (PROVENTIL/VENTOLIN HFA) 90 mcg/actuation inhaler Inhale 1-2 puffs into the lungs every 4 (four) hours as needed for Wheezing or Shortness of Breath. Rescue    albuterol sulfate 2.5 mg/0.5 mL Nebu Take 2.5 mg by nebulization every 4 (four) hours as needed. Rescue     Family History    None       Tobacco Use    Smoking status: Light Tobacco Smoker     Types: Cigars    Smokeless tobacco: Never Used   Substance and Sexual Activity    Alcohol use: Yes    Drug use: Not Currently    Sexual activity: Not on file     Review of Systems   Constitutional:  Negative for activity change, appetite change, chills, diaphoresis, fatigue and fever.   HENT:  Negative for congestion, dental problem, drooling, ear discharge, ear pain, facial swelling, hearing loss, mouth sores, nosebleeds, postnasal drip, rhinorrhea, sinus pressure, sneezing, sore throat, tinnitus, trouble swallowing and voice change.    Eyes:  Negative for photophobia, pain, discharge, redness, itching and visual disturbance.   Respiratory:  Positive for cough, shortness of breath and wheezing. Negative for apnea, choking, chest tightness and stridor.    Cardiovascular:  Negative for chest pain, palpitations and leg swelling.   Gastrointestinal:  Negative for abdominal distention, abdominal pain, anal bleeding, blood in stool, constipation, diarrhea, nausea, rectal pain and vomiting.   Endocrine: Negative for cold intolerance, heat intolerance, polydipsia, polyphagia and polyuria.   Genitourinary:  Negative for decreased urine volume, difficulty urinating, dyspareunia, dysuria, enuresis, flank pain, frequency, genital sores, hematuria, menstrual problem, pelvic pain, urgency, vaginal bleeding, vaginal discharge and vaginal  pain.   Musculoskeletal:  Negative for arthralgias, back pain, gait problem, joint swelling, myalgias, neck pain and neck stiffness.   Skin:  Negative for color change, pallor, rash and wound.   Allergic/Immunologic: Negative for environmental allergies, food allergies and immunocompromised state.   Neurological:  Negative for dizziness, tremors, seizures, syncope, facial asymmetry, speech difficulty, weakness, light-headedness, numbness and headaches.   Hematological:  Negative for adenopathy. Does not bruise/bleed easily.   Psychiatric/Behavioral:  Negative for agitation, behavioral problems, confusion, decreased concentration, dysphoric mood, hallucinations, self-injury, sleep disturbance and suicidal ideas. The patient is not nervous/anxious and is not hyperactive.    Objective:     Vital Signs (Most Recent):  Temp: 99.3 °F (37.4 °C) (02/23/22 0052)  Pulse: 105 (02/23/22 0338)  Resp: (!) 22 (02/23/22 0338)  BP: (!) 147/118 (02/23/22 0052)  SpO2: 99 % (02/23/22 0338)   Vital Signs (24h Range):  Temp:  [99.3 °F (37.4 °C)] 99.3 °F (37.4 °C)  Pulse:  [105] 105  Resp:  [22] 22  SpO2:  [95 %-99 %] 99 %  BP: (147)/(118) 147/118        There is no height or weight on file to calculate BMI.    Physical Exam  Constitutional:       General: She is not in acute distress.     Appearance: She is well-developed. She is not diaphoretic.   HENT:      Head: Normocephalic and atraumatic.      Right Ear: External ear normal.      Left Ear: External ear normal.      Nose: Nose normal.      Mouth/Throat:      Pharynx: No oropharyngeal exudate.   Eyes:      General: No scleral icterus.     Extraocular Movements: EOM normal.      Conjunctiva/sclera: Conjunctivae normal.      Pupils: Pupils are equal, round, and reactive to light.   Neck:      Thyroid: No thyromegaly.      Vascular: No JVD.      Trachea: No tracheal deviation.   Cardiovascular:      Rate and Rhythm: Normal rate and regular rhythm.      Heart sounds: Normal heart sounds.  No murmur heard.    No gallop.   Pulmonary:      Effort: Pulmonary effort is normal. No respiratory distress.      Breath sounds: Wheezing (bialteral expiratory wheezing) present. No rales.   Chest:      Chest wall: No tenderness.   Abdominal:      General: Bowel sounds are normal. There is no distension.      Palpations: Abdomen is soft. There is no mass.      Tenderness: There is no abdominal tenderness. There is no guarding or rebound.   Genitourinary:     Vagina: No vaginal discharge.   Musculoskeletal:         General: No tenderness.      Cervical back: Neck supple.   Lymphadenopathy:      Cervical: No cervical adenopathy.   Skin:     General: Skin is warm and dry.      Coloration: Skin is not pale.      Findings: No erythema or rash.   Neurological:      Mental Status: She is alert and oriented to person, place, and time.      Cranial Nerves: No cranial nerve deficit.      Motor: No abnormal muscle tone.      Coordination: Coordination normal.      Deep Tendon Reflexes: Reflexes are normal and symmetric. Reflexes normal.   Psychiatric:         Behavior: Behavior normal.         Thought Content: Thought content normal.         Judgment: Judgment normal.         CRANIAL NERVES     CN III, IV, VI   Pupils are equal, round, and reactive to light.  Extraocular motions are normal.      Significant Labs:   Recent Results (from the past 24 hour(s))   CBC auto differential    Collection Time: 02/23/22  3:34 AM   Result Value Ref Range    WBC 8.97 3.90 - 12.70 K/uL    RBC 4.37 4.00 - 5.40 M/uL    Hemoglobin 11.1 (L) 12.0 - 16.0 g/dL    Hematocrit 36.1 (L) 37.0 - 48.5 %    MCV 83 82 - 98 fL    MCH 25.4 (L) 27.0 - 31.0 pg    MCHC 30.7 (L) 32.0 - 36.0 g/dL    RDW 15.6 (H) 11.5 - 14.5 %    Platelets 264 150 - 450 K/uL    MPV 10.0 9.2 - 12.9 fL    Immature Granulocytes 0.1 0.0 - 0.5 %    Gran # (ANC) 6.0 1.8 - 7.7 K/uL    Immature Grans (Abs) 0.01 0.00 - 0.04 K/uL    Lymph # 1.8 1.0 - 4.8 K/uL    Mono # 0.7 0.3 - 1.0 K/uL     Eos # 0.4 0.0 - 0.5 K/uL    Baso # 0.06 0.00 - 0.20 K/uL    nRBC 0 0 /100 WBC    Gran % 67.2 38.0 - 73.0 %    Lymph % 19.7 18.0 - 48.0 %    Mono % 7.8 4.0 - 15.0 %    Eosinophil % 4.5 0.0 - 8.0 %    Basophil % 0.7 0.0 - 1.9 %    Differential Method Automated    Comprehensive metabolic panel    Collection Time: 02/23/22  3:34 AM   Result Value Ref Range    Sodium 140 136 - 145 mmol/L    Potassium 4.1 3.5 - 5.1 mmol/L    Chloride 104 95 - 110 mmol/L    CO2 24 23 - 29 mmol/L    Glucose 118 (H) 70 - 110 mg/dL    BUN 10 6 - 20 mg/dL    Creatinine 0.8 0.5 - 1.4 mg/dL    Calcium 9.8 8.7 - 10.5 mg/dL    Total Protein 8.8 (H) 6.0 - 8.4 g/dL    Albumin 4.0 3.5 - 5.2 g/dL    Total Bilirubin 0.5 0.1 - 1.0 mg/dL    Alkaline Phosphatase 62 55 - 135 U/L    AST 18 10 - 40 U/L    ALT 10 10 - 44 U/L    Anion Gap 12 8 - 16 mmol/L    eGFR if African American >60.0 >60 mL/min/1.73 m^2    eGFR if non African American >60.0 >60 mL/min/1.73 m^2   POCT COVID-19 Rapid Screening    Collection Time: 02/23/22  3:53 AM   Result Value Ref Range    POC Rapid COVID Negative Negative     Acceptable Yes    POCT urine pregnancy    Collection Time: 02/23/22  5:32 AM   Result Value Ref Range    POC Preg Test, Ur Negative Negative     Acceptable Yes          Significant Imaging: I have reviewed all pertinent imaging results/findings within the past 24 hours.    Assessment/Plan:     * Moderate asthma with acute exacerbation  -likely triggered by environmental dust as patient's childhood asthma were well controlled until after December when she moved to a new apartment and having renovation construction   -discharged from ED on 2/5 on short course of steroid and albuterol inhaler   -returned to ED last night with sudden onset chest tightness, sob and wheezing   -clinically improved with IV solumedrol, nebs and magnesium   -oxygenating well on 2 Liter and not in distress   -will start on prednisone, nebs and singulair    -advised to avoid environmental triggers that might exacerbate asthma   -needs advair and albuterol inhaler at discharge   -need outpatient referral for community clinic at discharge as patient does not have PCP       VTE Risk Mitigation (From admission, onward)         Ordered     IP VTE HIGH RISK PATIENT  Once         02/23/22 0558     Place sequential compression device  Until discontinued         02/23/22 0558                   Aicha Garcia, DO  Department of Hospital Medicine   [facility] - Emergency Dept

## 2022-02-23 NOTE — ED PROVIDER NOTES
Source of History:  Patient  Chart    Chief complaint:  Shortness of Breath (Hx of asthma. Recently given home meds to help but not having any relief. )      HPI:  Molly Aly is a 31 y.o. female with history of asthma presenting to emergency department with complaint of shortness of breath.    Per chart review, patient seen in this emergency department on 02/05/2022 with complaint of shortness of breath and cough.  She had used up in entire albuterol inhaler MDI without significant improvement of her symptoms.  She has nonischemic EKG, and a normal chest x-ray.  She was hypertensive but otherwise had normal vitals.  She had improvement in her short notice of breath with DuoNebs.  Her COVID test was negative.  She was discharged with prescriptions for albuterol, and a short steroid burst.    Patient states that she transiently felt mildly improved with the steroid burst, however as soon as she finished her steroid she began to feel very short of breath again.  States that she used up the entire albuterol MDI, as well as the albuterol nebs that she was given.  She has an ongoing cough without fever or sputum production.  No chest pain.  No abdominal pain or vomiting.  She has no history of previous hospital admissions or intubations for asthma.  She does not have a primary care doctor.    ROS: As per HPI and below:  Review of Systems   Constitutional: Negative for fever.   HENT: Negative for sore throat.    Eyes: Negative for double vision.   Respiratory: Positive for cough, shortness of breath and wheezing. Negative for sputum production.    Cardiovascular: Negative for chest pain.   Gastrointestinal: Negative for abdominal pain and vomiting.   Genitourinary: Negative for dysuria.   Musculoskeletal: Negative for falls.   Skin: Negative for rash.   Neurological: Negative for headaches.       Review of patient's allergies indicates:  No Known Allergies    No current facility-administered medications on file prior  to encounter.     Current Outpatient Medications on File Prior to Encounter   Medication Sig Dispense Refill    albuterol (PROVENTIL/VENTOLIN HFA) 90 mcg/actuation inhaler Inhale 1-2 puffs into the lungs every 4 (four) hours as needed for Wheezing or Shortness of Breath. Rescue 18 g 2    albuterol sulfate 2.5 mg/0.5 mL Nebu Take 2.5 mg by nebulization every 4 (four) hours as needed. Rescue 1 each 2       PMH:  As per HPI and below:  Past Medical History:   Diagnosis Date    Asthma      History reviewed. No pertinent surgical history.    Social History     Socioeconomic History    Marital status: Single   Tobacco Use    Smoking status: Light Tobacco Smoker     Types: Cigars    Smokeless tobacco: Never Used   Substance and Sexual Activity    Alcohol use: Yes    Drug use: Not Currently       History reviewed. No pertinent family history.    Physical Exam:      Vitals:    02/23/22 0338   BP:    Pulse: 105   Resp: (!) 22   Temp:      Gen:  Moderate respiratory distress.  Nontoxic.  Mental Status:  Alert and oriented.  Appropriate, conversant.  Skin: Warm, dry. No rashes seen.  Eyes: No conjunctival injection.  Pulm:  Diffuse expiratory wheezing, diminished air entry in all fields.  Conversationally dyspneic, tachypneic, hypoxic, satting well on nasal cannula.  Increased work of breathing, sitting upright, with accessory muscle use.  CV: Regular rate. Regular rhythm.   Abd: Soft.  Not distended.  Nontender.   MSK: Good range of motion all joints.  No deformities.    Neuro: Awake. Speech normal. No focal neuro deficit observed.    Laboratory Studies:  Labs Reviewed   CBC W/ AUTO DIFFERENTIAL - Abnormal; Notable for the following components:       Result Value    Hemoglobin 11.1 (*)     Hematocrit 36.1 (*)     MCH 25.4 (*)     MCHC 30.7 (*)     RDW 15.6 (*)     All other components within normal limits   COMPREHENSIVE METABOLIC PANEL - Abnormal; Notable for the following components:    Glucose 118 (*)     Total  Protein 8.8 (*)     All other components within normal limits   HIV 1 / 2 ANTIBODY   HEPATITIS C ANTIBODY   SARS-COV-2 RDRP GENE   POCT URINE PREGNANCY     Chart reviewed.  See summary in HPI    Imaging Results    None         Medications Given:  Medications   magnesium sulfate 2g in water 50mL IVPB (premix) (2 g Intravenous New Bag 2/23/22 5083)   albuterol sulfate nebulizer solution 15 mg (15 mg Nebulization Given 2/23/22 5237)   ipratropium 0.02 % nebulizer solution 1 mg (1 mg Nebulization Given 2/23/22 7158)   methylPREDNISolone sodium succinate injection 125 mg (125 mg Intravenous Given 2/23/22 0290)       Discussed with: IM    MDM:    31 y.o. female with history of childhood asthma presenting to ER with complaint of 2 weeks of ongoing shortness of breath and wheezing.  She is afebrile, tachycardic, hypoxic, conversationally dyspneic, sitting upright with moderate accessory muscle use.    She received a 15 and 1 DuoNeb, magnesium, and steroids.  Given the chronicity of his complaint, and her initial work of breathing, I placed her in observation to Internal Medicine for further treatment.    5:51 AM  Patient reassessed, significant for movement with 1st DuoNeb, no longer hypoxic but remains tachypneic, moderately conversationally dyspneic, with expiratory wheezing.  Patient placed on scheduled nebs.    Diagnostic Impression:    1. Exacerbation of asthma, unspecified asthma severity, unspecified whether persistent    2. Hypertension, unspecified type         ED Disposition Condition    Observation              Patient understands the plan and is in agreement, verbalized understanding, questions answered    Jessica Caballero MD  Emergency Medicine       Jessica Caballero MD  02/23/22 8503

## 2022-02-24 VITALS
HEIGHT: 63 IN | HEART RATE: 87 BPM | TEMPERATURE: 98 F | DIASTOLIC BLOOD PRESSURE: 90 MMHG | SYSTOLIC BLOOD PRESSURE: 128 MMHG | OXYGEN SATURATION: 99 % | WEIGHT: 218 LBS | RESPIRATION RATE: 18 BRPM | BODY MASS INDEX: 38.62 KG/M2

## 2022-02-24 LAB
HCV AB SERPL QL IA: NEGATIVE
HIV 1+2 AB+HIV1 P24 AG SERPL QL IA: NEGATIVE

## 2022-02-24 PROCEDURE — 25000003 PHARM REV CODE 250: Performed by: HOSPITALIST

## 2022-02-24 PROCEDURE — 99900031 HC PATIENT EDUCATION (STAT)

## 2022-02-24 PROCEDURE — 94640 AIRWAY INHALATION TREATMENT: CPT

## 2022-02-24 PROCEDURE — G0378 HOSPITAL OBSERVATION PER HR: HCPCS

## 2022-02-24 PROCEDURE — 99217 PR OBSERVATION CARE DISCHARGE: ICD-10-PCS | Mod: ,,, | Performed by: STUDENT IN AN ORGANIZED HEALTH CARE EDUCATION/TRAINING PROGRAM

## 2022-02-24 PROCEDURE — 25000242 PHARM REV CODE 250 ALT 637 W/ HCPCS: Performed by: STUDENT IN AN ORGANIZED HEALTH CARE EDUCATION/TRAINING PROGRAM

## 2022-02-24 PROCEDURE — 99406 BEHAV CHNG SMOKING 3-10 MIN: CPT

## 2022-02-24 PROCEDURE — 94761 N-INVAS EAR/PLS OXIMETRY MLT: CPT

## 2022-02-24 PROCEDURE — 25000242 PHARM REV CODE 250 ALT 637 W/ HCPCS: Performed by: HOSPITALIST

## 2022-02-24 PROCEDURE — 63600175 PHARM REV CODE 636 W HCPCS: Performed by: HOSPITALIST

## 2022-02-24 PROCEDURE — 99217 PR OBSERVATION CARE DISCHARGE: CPT | Mod: ,,, | Performed by: STUDENT IN AN ORGANIZED HEALTH CARE EDUCATION/TRAINING PROGRAM

## 2022-02-24 RX ORDER — FLUTICASONE PROPIONATE AND SALMETEROL 250; 50 UG/1; UG/1
1 POWDER RESPIRATORY (INHALATION) 2 TIMES DAILY
Qty: 180 EACH | Refills: 3 | Status: SHIPPED | OUTPATIENT
Start: 2022-02-24 | End: 2023-08-24

## 2022-02-24 RX ORDER — PREDNISONE 50 MG/1
50 TABLET ORAL DAILY
Qty: 4 TABLET | Refills: 0 | Status: SHIPPED | OUTPATIENT
Start: 2022-02-25 | End: 2022-03-01

## 2022-02-24 RX ORDER — ALBUTEROL SULFATE 2.5 MG/.5ML
2.5 SOLUTION RESPIRATORY (INHALATION) EVERY 4 HOURS PRN
Qty: 1 EACH | Refills: 2 | Status: SHIPPED | OUTPATIENT
Start: 2022-02-24 | End: 2022-12-13 | Stop reason: SDUPTHER

## 2022-02-24 RX ORDER — MONTELUKAST SODIUM 10 MG/1
10 TABLET ORAL DAILY
Qty: 30 TABLET | Refills: 3 | Status: SHIPPED | OUTPATIENT
Start: 2022-02-25 | End: 2022-05-26

## 2022-02-24 RX ADMIN — IPRATROPIUM BROMIDE AND ALBUTEROL SULFATE 3 ML: 2.5; .5 SOLUTION RESPIRATORY (INHALATION) at 09:02

## 2022-02-24 RX ADMIN — PREDNISONE 50 MG: 20 TABLET ORAL at 09:02

## 2022-02-24 RX ADMIN — IPRATROPIUM BROMIDE AND ALBUTEROL SULFATE 3 ML: 2.5; .5 SOLUTION RESPIRATORY (INHALATION) at 05:02

## 2022-02-24 RX ADMIN — FLUTICASONE FUROATE AND VILANTEROL TRIFENATATE 1 PUFF: 200; 25 POWDER RESPIRATORY (INHALATION) at 09:02

## 2022-02-24 RX ADMIN — MONTELUKAST 10 MG: 10 TABLET, FILM COATED ORAL at 09:02

## 2022-02-24 RX ADMIN — IPRATROPIUM BROMIDE AND ALBUTEROL SULFATE 3 ML: 2.5; .5 SOLUTION RESPIRATORY (INHALATION) at 01:02

## 2022-02-24 NOTE — PLAN OF CARE
02/24/22 1637   Post-Acute Status   Post-Acute Authorization Other   Other Status No Post-Acute Service Needs     No post-acute needs identified at this time. Pt is expected to discharge home. SW will continue to follow as needed.    SW scheduled Lyft ride  for 5:00PM.    SW let on-call SW know to follow along if there are any issues.     Nery Mcfarlane LMSW  [facility]  Ext. [phone]

## 2022-02-24 NOTE — PLAN OF CARE
To Whom It May Concern,       I have taken care of Molly Aly during her hospitalization 02/23-02/24. Please excuse her from work until- 02/28. She may return to work 02/28.     Take care,         Bharti Tavera MD  Department of Hospital Medicine  Department of Pediatrics  2/24/2022

## 2022-02-24 NOTE — PLAN OF CARE
[facility] - Telemetry Stepdown ([place]-7)  Initial Discharge Assessment       Primary Care Provider: Primary Doctor No    Admission Diagnosis: Chest pain [R07.9]  Hypertension, unspecified type [I10]  Exacerbation of asthma, unspecified asthma severity, unspecified whether persistent [J45.901]    Admission Date: 2/23/2022  Expected Discharge Date: 2/24/2022         Payor: BLUE CROSS BLUE SHIELD / Plan: BCBS ALL OUT OF STATE / Product Type: PPO /     Extended Emergency Contact Information  Primary Emergency Contact: AlySera  Mobile Phone: [phone]  Relation: Mother   needed? No    Discharge Plan A: (P) Home  Discharge Plan B: (P) Home    No Pharmacies Listed           SW completed Discharge Planning Assessment with patient via bedside. Discharge planning booklet given to patient/family and whiteboard updated with GILLIAN and phone #. All questions answered.    Patient reported that she may need assistance with transportation upon discharge.     Patient reported that she lives alone and prior to hospitalization she was independent with her ADL's. Patient reported that she is not on dialysis and she does not go to a Coumadin clinic.    Patient reported that she just changed over to a new insurance plan and she has to find a new PCP.     Patient lives in an apartment complex with stairs to get to her apartment. Patient reported that she does not have difficulty climbing stairs.       Nery Mcfarlane LMSW  [facility]  Ext. [phone]

## 2022-02-24 NOTE — PLAN OF CARE
On Call SW advised by RN that Pt did not make it downstairs in time for her ride. SW rescheduled and advised by RN that Pt is now downstairs waiting for her new ride.     Fatou Fontanez LCSW  Neurocritical Care   [facility]  [id number]

## 2022-02-24 NOTE — PLAN OF CARE
Problem: Asthma Exacerbation  Goal: Asthma Symptom Relief  Outcome: Ongoing, Progressing  Goal: Absence of Hospital-Acquired Illness or Injury  Outcome: Ongoing, Progressing  Goal: Optimal Comfort and Wellbeing  Outcome: Ongoing, Progressing  Goal: Readiness for Transition of Care  Outcome: Ongoing, Progressing

## 2022-02-25 NOTE — PLAN OF CARE
[facility] - Telemetry Stepdown ([facility])  Discharge Final Note    Primary Care Provider: Primary Doctor No    Expected Discharge Date: 2/24/2022    Patient discharged to home via Lyft transportation.     Patient's bedside nurse and patient notified of the above.      Final Discharge Note (most recent)       Final Note - 02/25/22 0922          Final Note    Assessment Type Final Discharge Note (P)      Anticipated Discharge Disposition Home or Self Care (P)         Post-Acute Status    Post-Acute Authorization Other (P)      Other Status No Post-Acute Service Needs (P)                      Important Message from Medicare             Contact Info       [facility] [address]   Specialty: Pulmonology    [address]  [facility] [address]   Phone: [phone]       Next Steps: Schedule an appointment as soon as possible for a visit            Future Appointments   Date Time Provider Department Center   3/3/2022  9:00 AM Griselda Gutierrez MD [facility] PC[facility]        scheduled post-discharge follow-up appointment and information added to AVS.     Nery Mcfarlane LMSW  [facility]  Ext. [phone]

## 2022-03-01 NOTE — DISCHARGE SUMMARY
[facility] [facility]  Discharge Summary      Patient Name: Molly Aly  MRN: [id number]  Patient Class: OP- Observation  Admission Date: 2/23/2022  Hospital Length of Stay: 1 day  Discharge Date and Time:  02/24/2022 11:01 PM  Attending Physician: Addie att. providers found   Discharging Provider: Bharti Tavera MD  Primary Care Provider: Primary Doctor Addie  [facility] Medicine Team: [facility] HOSP MED D Bharti Tavera MD    HPI:   Ms. Aly is a 31 y.o. female with history of childhood asthma presenting to emergency department with complaint of shortness of breath.      Per chart review, patient seen in this emergency department on 02/05/2022 with complaint of shortness of breath and cough. She was sent home on short course of steroid, albuterol inhaler and advised to follow up with PCP. Patient states she felt better for few days after going home. Patient reports sudden onset chest tightness, wheezing and SOB last night which prompted her to come to ED. Patient states her childhood asthma has been well controlled until few weeks ago when she started with asthma flare ups. Patient thinks recent renovation construction and outside her apartment likely contributed to her recent asthma exacerbation. Patient notes she moved current apartment in December and a month later she started having asthma symptoms. She denies any other changes. Denies fever, chills, cough, chest pain, palpitation, N/V/D/abdominal pain, urinary complaints, focal weakness or numbness. She denies tobacco, alcohol or other illicit drug use.  She does not have a primary care doctor. Patient works as hotel security.      Patient received nebs, solumedrol and magnesium in ED with some clinical improvement.     Hospital Course:   Patient had improvement in signs and symptoms. Discussed continuation of prednisone to complete 5d steroid burst. Also discussed continuing albuterol q4h for the next 24hrs, then q6h for  the following 24hrs, and then PRN. Patient was in agreement w/plan. PCP follow-up made. Medications delivered to bedside. Work excuse provided- ok to return to work 02/28/22.    Pt deemed appropriate for discharge. Plan discussed with pt, who was agreeable and amenable; medications were discussed and reviewed, outpatient follow-up scheduled, ER precautions were given, all questions were answered to the pt's satisfaction, and Ms. Aly was subsequently discharged.    Physical Exam  Gen: in NAD, appears stated age  Neuro: AAOx3, motor, sensory, and strength grossly intact BL  HEENT: NTNC, EOMI, PERRL, MMM  CVS: RRR, no m/r/g; S1/S2 auscultated with no S3 or S4; capillary refill < 2 sec  Resp: end exp wheeze appreciated; no belabored breathing or accessory muscle use appreciated   Abd: BS+ in all 4 quadrants; NTND, soft to palpation; no organomegaly appreciated   Extrem: pulses full, equal, and regular over all 4 extremities; no UE or LE edema BL     Goals of Care Treatment Preferences:  Code Status: Full Code    Consults:      Final Active Diagnoses:    Diagnosis Date Noted POA    PRINCIPAL PROBLEM:  Moderate asthma with acute exacerbation [J45.901] 02/23/2022 Yes      Problems Resolved During this Admission:     Discharged Condition: good    Disposition: Home or Self Care    Follow Up:   Follow-up Information     Chance Villanueva - Pulmonary [facility] 9th Fl. Schedule an appointment as soon as possible for a visit .    Specialty: Pulmonology  Contact information:  [address] Villanueva  [facility] [address] [phone]  Additional information:  Main Building, 9th Floor   Please park in [facility] and use Clinic elevator                     Patient Instructions:      Diet Adult Regular     Notify your health care provider if you experience any of the following:  difficulty breathing or increased cough     Notify your health care provider if you experience any of the following:  persistent dizziness,  light-headedness, or visual disturbances     Activity as tolerated       Significant Diagnostic Studies: Labs: CMP No results for input(s): NA, K, CL, CO2, GLU, BUN, CREATININE, CALCIUM, PROT, ALBUMIN, BILITOT, ALKPHOS, AST, ALT, ANIONGAP, ESTGFRAFRICA, EGFRNONAA in the last 48 hours., CBC No results for input(s): WBC, HGB, HCT, PLT in the last 48 hours. and All labs within the past 24 hours have been reviewed  Radiology: X-Ray: none    Medications:  Reconciled Home Medications:      Medication List      START taking these medications    ADVAIR DISKUS 250-50 mcg/dose diskus inhaler  Generic drug: fluticasone-salmeterol 250-50 mcg/dose  Inhale 1 puff into the lungs 2 (two) times daily. Controller     montelukast 10 mg tablet  Commonly known as: SINGULAIR  Take 1 tablet (10 mg total) by mouth once daily.     predniSONE 50 MG Tab  Commonly known as: DELTASONE  Take 1 tablet (50 mg total) by mouth once daily. for 4 days        CONTINUE taking these medications    * albuterol 90 mcg/actuation inhaler  Commonly known as: PROVENTIL/VENTOLIN HFA  Inhale 1-2 puffs into the lungs every 4 (four) hours as needed for Wheezing or Shortness of Breath. Rescue     * albuterol sulfate 2.5 mg/0.5 mL Nebu  Take 2.5 mg by nebulization every 4 (four) hours as needed. Rescue         * This list has 2 medication(s) that are the same as other medications prescribed for you. Read the directions carefully, and ask your doctor or other care provider to review them with you.                Indwelling Lines/Drains at time of discharge:   Lines/Drains/Airways     None                 Time spent on the discharge of patient: 35 minutes           Bharti Tavera MD  Department of Hospital Medicine  [facility] - Telemetry Stepdown ([place]-7)

## 2022-03-01 NOTE — HOSPITAL COURSE
Patient had improvement in signs and symptoms. Discussed continuation of prednisone to complete 5d steroid burst. Also discussed continuing albuterol q4h for the next 24hrs, then q6h for the following 24hrs, and then PRN. Patient was in agreement w/plan. PCP follow-up made. Medications delivered to bedside. Work excuse provided- ok to return to work 02/28/22.    Pt deemed appropriate for discharge. Plan discussed with pt, who was agreeable and amenable; medications were discussed and reviewed, outpatient follow-up scheduled, ER precautions were given, all questions were answered to the pt's satisfaction, and Ms. Aly was subsequently discharged.    Physical Exam  Gen: in NAD, appears stated age  Neuro: AAOx3, motor, sensory, and strength grossly intact BL  HEENT: NTNC, EOMI, PERRL, MMM  CVS: RRR, no m/r/g; S1/S2 auscultated with no S3 or S4; capillary refill < 2 sec  Resp: end exp wheeze appreciated; no belabored breathing or accessory muscle use appreciated   Abd: BS+ in all 4 quadrants; NTND, soft to palpation; no organomegaly appreciated   Extrem: pulses full, equal, and regular over all 4 extremities; no UE or LE edema BL

## 2022-03-03 ENCOUNTER — LAB VISIT (OUTPATIENT)
Dept: LAB | Facility: HOSPITAL | Age: 32
End: 2022-03-03
Payer: COMMERCIAL

## 2022-03-03 ENCOUNTER — OFFICE VISIT (OUTPATIENT)
Dept: INTERNAL MEDICINE | Facility: CLINIC | Age: 32
End: 2022-03-03
Payer: COMMERCIAL

## 2022-03-03 VITALS — HEART RATE: 76 BPM | DIASTOLIC BLOOD PRESSURE: 82 MMHG | SYSTOLIC BLOOD PRESSURE: 118 MMHG

## 2022-03-03 DIAGNOSIS — Z00.00 ENCOUNTER FOR MEDICAL EXAMINATION TO ESTABLISH CARE: Primary | ICD-10-CM

## 2022-03-03 DIAGNOSIS — Z09 HOSPITAL DISCHARGE FOLLOW-UP: ICD-10-CM

## 2022-03-03 DIAGNOSIS — Z23 NEED FOR VACCINATION: ICD-10-CM

## 2022-03-03 DIAGNOSIS — J45.909 ASTHMA, UNSPECIFIED ASTHMA SEVERITY, UNSPECIFIED WHETHER COMPLICATED, UNSPECIFIED WHETHER PERSISTENT: ICD-10-CM

## 2022-03-03 DIAGNOSIS — Z00.00 ENCOUNTER FOR MEDICAL EXAMINATION TO ESTABLISH CARE: ICD-10-CM

## 2022-03-03 LAB
CHOLEST SERPL-MCNC: 164 MG/DL (ref 120–199)
CHOLEST/HDLC SERPL: 2.6 {RATIO} (ref 2–5)
ESTIMATED AVG GLUCOSE: 105 MG/DL (ref 68–131)
HBA1C MFR BLD: 5.3 % (ref 4–5.6)
HDLC SERPL-MCNC: 63 MG/DL (ref 40–75)
HDLC SERPL: 38.4 % (ref 20–50)
LDLC SERPL CALC-MCNC: 92.4 MG/DL (ref 63–159)
NONHDLC SERPL-MCNC: 101 MG/DL
TRIGL SERPL-MCNC: 43 MG/DL (ref 30–150)
TSH SERPL DL<=0.005 MIU/L-ACNC: 2.01 UIU/ML (ref 0.4–4)

## 2022-03-03 PROCEDURE — 84443 ASSAY THYROID STIM HORMONE: CPT | Performed by: STUDENT IN AN ORGANIZED HEALTH CARE EDUCATION/TRAINING PROGRAM

## 2022-03-03 PROCEDURE — 80061 LIPID PANEL: CPT | Performed by: STUDENT IN AN ORGANIZED HEALTH CARE EDUCATION/TRAINING PROGRAM

## 2022-03-03 PROCEDURE — 99385 PR PREVENTIVE VISIT,NEW,18-39: ICD-10-PCS | Mod: S$GLB,,, | Performed by: STUDENT IN AN ORGANIZED HEALTH CARE EDUCATION/TRAINING PROGRAM

## 2022-03-03 PROCEDURE — 99999 PR PBB SHADOW E&M-EST. PATIENT-LVL III: CPT | Mod: PBBFAC,,, | Performed by: STUDENT IN AN ORGANIZED HEALTH CARE EDUCATION/TRAINING PROGRAM

## 2022-03-03 PROCEDURE — 83036 HEMOGLOBIN GLYCOSYLATED A1C: CPT | Performed by: STUDENT IN AN ORGANIZED HEALTH CARE EDUCATION/TRAINING PROGRAM

## 2022-03-03 PROCEDURE — 99385 PREV VISIT NEW AGE 18-39: CPT | Mod: S$GLB,,, | Performed by: STUDENT IN AN ORGANIZED HEALTH CARE EDUCATION/TRAINING PROGRAM

## 2022-03-03 PROCEDURE — 99999 PR PBB SHADOW E&M-EST. PATIENT-LVL III: ICD-10-PCS | Mod: PBBFAC,,, | Performed by: STUDENT IN AN ORGANIZED HEALTH CARE EDUCATION/TRAINING PROGRAM

## 2022-03-03 PROCEDURE — 36415 COLL VENOUS BLD VENIPUNCTURE: CPT | Performed by: STUDENT IN AN ORGANIZED HEALTH CARE EDUCATION/TRAINING PROGRAM

## 2022-03-03 NOTE — PROGRESS NOTES
"INTERNAL MEDICINE RESIDENT CLINIC  CLINIC NOTE    Patient Name: Molly Aly  YOB: 1990    PRESENTING HISTORY       History of Present Illness:  Ms. Molly Aly is a 31 y.o. female w/ a PMHx of asthma with recent asthma exacerbation who presents to [facility]. She was recently admitted 2/23-2/24 for asthma exacerbation and discharged with a 5 day prednisone course and an albuterol regimen. She is now on singulair, albuterol, and advair for maintenance. She recently moved to a new home and has had issues with her asthma since moving. Since hospital discharge she has felt well and reports no problems. Her asthma has been very well controlled on her maintenance inhaler. She has not needed her rescue inhaler or nebulizer since discharge. She denies fevers, chills, CP, chest tightness, FORRESTER, SOB, abdominal pain, N/V/D/C, and weakness.    Annual Exam:    CVD Risk Assessment: http://tools.acc.org/CKVUT-Bwpj-Vxpnizfhq-Plus/#!/calculate/estimate/  Age: 31 y.o..  HTN: no  HLD (age > 40 year): no  Smoker: No  Statin: no  ASA: no    CKD: no  DM (age > 40 years): no  Systemic Inflammation (SLE, RA): no  Metabolic Syndrome: no  (Metabolic syndrome is characterized by the presence of at least three of the following conditions: elevated glucose level, central obesity, low HDL cholesterol level, elevated triglyceride level, and elevated blood pressure)    Depression: PHQ2 0 - 3 for each; "How often have you experienced the following over the past 2 weeks?  Little interest, pleasure while doing things: 0  Feeling down, depressed, hopeless: 0    Thyroid:   History of Thyroid Disease: no    American Thyroid Association and the American Association of Clinical Endocrinologists recommend measuring TSH in individuals at risk for hypothyroidism (personal history of autoimmune disease, neck radiation, or thyroid surgery); and considering screening adults 60 years and older.    Sexual History:   Pt is sexually active " with one male partner.   Uses nothing for protection.   Has recently been tested for STIs.   Has not tested positive for any STIs in the past.   Is not in need of STI testing today.    Screening for Substance Abuse:  Tobacco use:   Cigarette smoker: no, never   Cigar smoker: yes, quit 2 months ago   Vaping: no   Chewing tobacco: no  Alcohol use: 1-2 drinks on special occasions.   Illicit Substance abuse:   Marijuana: occasionally in the past, quit recently.   Cocaine: no   Other Illicit drug use: no  Use of prescriptions not prescribed to patient: no  Vitamins/Supplements/Homeopathic meds/substances: none    Screening for Abuse:  Lives in a Apartment. Does feel safe at home.    Screening for Cancer:   Cervical (every 3 years age 21-65): Last done unknown. Next due at next Gyn appointment.    Vaccines:  Due for the following vaccines: TDAP, Flu, and COVID vaccines. Did not discuss HPV vaccines.  Pt agreed to receive some of the recommended vaccinations (not COVID or Flu).      Review of Systems   Constitutional: Negative for chills, fever, malaise/fatigue and weight loss.   HENT: Negative for congestion, hearing loss, sinus pain and tinnitus.    Eyes: Negative for blurred vision, double vision and redness.   Respiratory: Negative for cough, sputum production and shortness of breath.    Cardiovascular: Negative for chest pain, palpitations, orthopnea and leg swelling.   Gastrointestinal: Negative for abdominal pain, blood in stool, constipation, diarrhea, heartburn, nausea and vomiting.   Genitourinary: Negative for dysuria, frequency and urgency.   Musculoskeletal: Negative for falls, joint pain and myalgias.   Skin: Negative.    Neurological: Negative for dizziness, loss of consciousness, weakness and headaches.   Endo/Heme/Allergies: Does not bruise/bleed easily.   Psychiatric/Behavioral: Negative for depression and suicidal ideas. The patient is not nervous/anxious and does not have insomnia.        OBJECTIVE:    Vital Signs:  Vitals:    03/03/22 0949   BP: 118/82   Pulse: 76       No results found for this or any previous visit (from the past 24 hour(s)).      Physical Exam  Constitutional:       General: She is not in acute distress.     Appearance: Normal appearance. She is overweight. She is not ill-appearing.   HENT:      Head: Normocephalic and atraumatic.      Right Ear: External ear normal.      Left Ear: External ear normal.      Nose: Nose normal.      Mouth/Throat:      Mouth: Mucous membranes are moist.      Pharynx: Oropharynx is clear. No posterior oropharyngeal erythema.   Eyes:      General: No scleral icterus.     Extraocular Movements: Extraocular movements intact.      Conjunctiva/sclera: Conjunctivae normal.   Neck:      Thyroid: No thyromegaly.      Vascular: No JVD.      Trachea: No tracheal deviation.   Cardiovascular:      Rate and Rhythm: Normal rate and regular rhythm.      Pulses: Normal pulses.      Heart sounds: Normal heart sounds. No murmur heard.  Pulmonary:      Effort: Pulmonary effort is normal. No respiratory distress.      Breath sounds: Normal breath sounds.   Chest:      Chest wall: No tenderness.   Abdominal:      General: Bowel sounds are normal. There is no distension.      Palpations: Abdomen is soft. There is no mass.      Tenderness: There is no abdominal tenderness.   Musculoskeletal:         General: No tenderness. Normal range of motion.      Cervical back: Normal range of motion and neck supple.      Right lower leg: No edema.      Left lower leg: No edema.   Lymphadenopathy:      Cervical: No cervical adenopathy.   Skin:     General: Skin is warm and dry.      Capillary Refill: Capillary refill takes less than 2 seconds.      Findings: No bruising, erythema or rash.   Neurological:      General: No focal deficit present.      Mental Status: She is alert and oriented to person, place, and time. Mental status is at baseline.      Cranial Nerves: No cranial nerve deficit.       Motor: No weakness.      Gait: Gait is intact. Gait normal.      Deep Tendon Reflexes: Reflexes are normal and symmetric.   Psychiatric:         Mood and Affect: Mood and affect normal.         Behavior: Behavior is cooperative.         Thought Content: Thought content normal.         Cognition and Memory: Memory normal.         ASSESSMENT & PLAN:     Diagnoses and all orders for this visit:    Encounter for medical examination to establish care  Ms. Aly is due for her flu, covid, and TDAP vaccines. She would like to receive her TDAP vaccine at this appointment. She has stopped smoking cigars since her asthma symptoms have worsened. She is currently sexually active with one male partner and is not using protection. She is not attempting to prevent pregnancy and is interested in preparing for any potential future pregnancies. She quit cigar smoking and marijuana use. She only consumes alcohol occasionally. She does not take any daily vitamins but is interested in starting a prenatal vitamin. She will get her TDAP today. She would like a referral to an OBGYN for a PAP smear and exam.  -     Hemoglobin A1C; Future  -     TSH; Future  -     Lipid Panel; Future  -     Ambulatory referral/consult to Obstetrics / Gynecology; Future    Need for vaccination  Ms. Aly is due for her flu, covid, and TDAP vaccines. She would like to receive her TDAP vaccine at this appointment. She is contemplating the Moderna COVID vaccine but is anxious because her sister developed blood clots in her eyes after vaccination. If she were to get pregnant, she would be more encouraged to proceed with vaccination sooner.    Hospital discharge follow-up  She reports doing well since discharge only using her Singulair and maintenance inhaler. She is looking to move apartments soon to eliminate environmental allergens.    Asthma, unspecified asthma severity, unspecified whether complicated, unspecified whether persistent  Continuing Advair  maintenance inhaler. Does not need any refills. Has albuterol rescue inhaler and albuterol nebs.        Discussed with Dr. Savita Washington.  Will contact w/ lab results and will Follow up in about 1 year (around 3/3/2023), or if symptoms worsen or fail to improve.     Signed:    Griselda Gutierrez M.D.  Internal Medicine PGY-2  03/03/2022 10:14 AM.

## 2022-03-03 NOTE — PROGRESS NOTES
I have reviewed the notes, assessments, and/or procedures performed this visit, and I concur with the documentation for Ms. RiosMolly Jermain.    Savita Washington MD  Hospital Medicine Assistant Staff  [facility]

## 2022-03-07 ENCOUNTER — PATIENT OUTREACH (OUTPATIENT)
Dept: EMERGENCY MEDICINE | Facility: HOSPITAL | Age: 32
End: 2022-03-07
Payer: COMMERCIAL

## 2022-03-11 NOTE — PROGRESS NOTES
Attempted to contact patient on 3 separate occasions, patient is unable to reach. ED Navigator to close encounter at this time.      Sandra Perez, ED Navigator, [facility]  [phone], ext. [phone]

## 2022-03-30 ENCOUNTER — LAB VISIT (OUTPATIENT)
Dept: LAB | Facility: HOSPITAL | Age: 32
End: 2022-03-30
Attending: OBSTETRICS & GYNECOLOGY
Payer: COMMERCIAL

## 2022-03-30 ENCOUNTER — OFFICE VISIT (OUTPATIENT)
Dept: OBSTETRICS AND GYNECOLOGY | Facility: CLINIC | Age: 32
End: 2022-03-30
Payer: COMMERCIAL

## 2022-03-30 VITALS
HEIGHT: 63 IN | DIASTOLIC BLOOD PRESSURE: 82 MMHG | BODY MASS INDEX: 41.18 KG/M2 | WEIGHT: 232.38 LBS | SYSTOLIC BLOOD PRESSURE: 124 MMHG

## 2022-03-30 DIAGNOSIS — Z01.419 WOMEN'S ANNUAL ROUTINE GYNECOLOGICAL EXAMINATION: Primary | ICD-10-CM

## 2022-03-30 DIAGNOSIS — Z11.3 SCREEN FOR STD (SEXUALLY TRANSMITTED DISEASE): ICD-10-CM

## 2022-03-30 DIAGNOSIS — Z12.4 PAP SMEAR FOR CERVICAL CANCER SCREENING: ICD-10-CM

## 2022-03-30 PROCEDURE — 3008F PR BODY MASS INDEX (BMI) DOCUMENTED: ICD-10-PCS | Mod: CPTII,S$GLB,, | Performed by: OBSTETRICS & GYNECOLOGY

## 2022-03-30 PROCEDURE — 1159F PR MEDICATION LIST DOCUMENTED IN MEDICAL RECORD: ICD-10-PCS | Mod: CPTII,S$GLB,, | Performed by: OBSTETRICS & GYNECOLOGY

## 2022-03-30 PROCEDURE — 88175 CYTOPATH C/V AUTO FLUID REDO: CPT | Performed by: OBSTETRICS & GYNECOLOGY

## 2022-03-30 PROCEDURE — 3079F DIAST BP 80-89 MM HG: CPT | Mod: CPTII,S$GLB,, | Performed by: OBSTETRICS & GYNECOLOGY

## 2022-03-30 PROCEDURE — 99385 PR PREVENTIVE VISIT,NEW,18-39: ICD-10-PCS | Mod: S$GLB,,, | Performed by: OBSTETRICS & GYNECOLOGY

## 2022-03-30 PROCEDURE — 86592 SYPHILIS TEST NON-TREP QUAL: CPT | Performed by: OBSTETRICS & GYNECOLOGY

## 2022-03-30 PROCEDURE — 3079F PR MOST RECENT DIASTOLIC BLOOD PRESSURE 80-89 MM HG: ICD-10-PCS | Mod: CPTII,S$GLB,, | Performed by: OBSTETRICS & GYNECOLOGY

## 2022-03-30 PROCEDURE — 3044F HG A1C LEVEL LT 7.0%: CPT | Mod: CPTII,S$GLB,, | Performed by: OBSTETRICS & GYNECOLOGY

## 2022-03-30 PROCEDURE — 3074F SYST BP LT 130 MM HG: CPT | Mod: CPTII,S$GLB,, | Performed by: OBSTETRICS & GYNECOLOGY

## 2022-03-30 PROCEDURE — 80074 ACUTE HEPATITIS PANEL: CPT | Performed by: OBSTETRICS & GYNECOLOGY

## 2022-03-30 PROCEDURE — 99999 PR PBB SHADOW E&M-EST. PATIENT-LVL III: ICD-10-PCS | Mod: PBBFAC,,, | Performed by: OBSTETRICS & GYNECOLOGY

## 2022-03-30 PROCEDURE — 99999 PR PBB SHADOW E&M-EST. PATIENT-LVL III: CPT | Mod: PBBFAC,,, | Performed by: OBSTETRICS & GYNECOLOGY

## 2022-03-30 PROCEDURE — 1159F MED LIST DOCD IN RCRD: CPT | Mod: CPTII,S$GLB,, | Performed by: OBSTETRICS & GYNECOLOGY

## 2022-03-30 PROCEDURE — 99385 PREV VISIT NEW AGE 18-39: CPT | Mod: S$GLB,,, | Performed by: OBSTETRICS & GYNECOLOGY

## 2022-03-30 PROCEDURE — 36415 COLL VENOUS BLD VENIPUNCTURE: CPT | Mod: PN | Performed by: OBSTETRICS & GYNECOLOGY

## 2022-03-30 PROCEDURE — 1160F PR REVIEW ALL MEDS BY PRESCRIBER/CLIN PHARMACIST DOCUMENTED: ICD-10-PCS | Mod: CPTII,S$GLB,, | Performed by: OBSTETRICS & GYNECOLOGY

## 2022-03-30 PROCEDURE — 3074F PR MOST RECENT SYSTOLIC BLOOD PRESSURE < 130 MM HG: ICD-10-PCS | Mod: CPTII,S$GLB,, | Performed by: OBSTETRICS & GYNECOLOGY

## 2022-03-30 PROCEDURE — 3008F BODY MASS INDEX DOCD: CPT | Mod: CPTII,S$GLB,, | Performed by: OBSTETRICS & GYNECOLOGY

## 2022-03-30 PROCEDURE — 87491 CHLMYD TRACH DNA AMP PROBE: CPT | Performed by: OBSTETRICS & GYNECOLOGY

## 2022-03-30 PROCEDURE — 1160F RVW MEDS BY RX/DR IN RCRD: CPT | Mod: CPTII,S$GLB,, | Performed by: OBSTETRICS & GYNECOLOGY

## 2022-03-30 PROCEDURE — 3044F PR MOST RECENT HEMOGLOBIN A1C LEVEL <7.0%: ICD-10-PCS | Mod: CPTII,S$GLB,, | Performed by: OBSTETRICS & GYNECOLOGY

## 2022-03-30 PROCEDURE — 87624 HPV HI-RISK TYP POOLED RSLT: CPT | Performed by: OBSTETRICS & GYNECOLOGY

## 2022-03-30 PROCEDURE — 87591 N.GONORRHOEAE DNA AMP PROB: CPT | Performed by: OBSTETRICS & GYNECOLOGY

## 2022-03-30 PROCEDURE — 87389 HIV-1 AG W/HIV-1&-2 AB AG IA: CPT | Performed by: OBSTETRICS & GYNECOLOGY

## 2022-03-30 RX ORDER — ALBUTEROL SULFATE 0.83 MG/ML
SOLUTION RESPIRATORY (INHALATION)
COMMUNITY
Start: 2022-02-24

## 2022-03-30 RX ORDER — PEAK FLOW METER
EACH MISCELLANEOUS
COMMUNITY
Start: 2022-02-07

## 2022-03-30 NOTE — PROGRESS NOTES
Molly Aly is a 31 y.o. female  who presents as a new patient for annual exam.  Menses occur monthly, lasting 5-7 days in duration, without intermenstrual bleeding.  Denies history of abnormal paps.  Requests STD screening.  No gyn complaints.  Patient's last menstrual period was 2022.    Past Medical History:   Diagnosis Date    Asthma        History reviewed. No pertinent surgical history.    OB History        0    Para   0    Term   0       0    AB   0    Living   0       SAB   0    IAB   0    Ectopic   0    Multiple   0    Live Births   0                 ROS:  GENERAL: Feeling well overall.   SKIN: Denies rash or lesions.   HEAD: Denies head injury or headache.   NODES: Denies enlarged lymph nodes.   CHEST: Denies chest pain or shortness of breath.   CARDIOVASCULAR: Denies palpitations or left sided chest pain.   ABDOMEN: No abdominal pain, nausea, vomiting or rectal bleeding.   URINARY: No dysuria or hematuria.  REPRODUCTIVE: See HPI.   BREASTS: Denies pain, lumps, or nipple discharge.   HEMATOLOGIC: No easy bruisability or excessive bleeding.   MUSCULOSKELETAL: Denies joint pain or swelling.   NEUROLOGIC: Denies syncope or weakness.   PSYCHIATRIC: Denies depression.    PE:   (chaperone present during entire exam)  APPEARANCE: Well nourished, well developed, in no acute distress.  BREASTS: Symmetrical, no skin changes or visible lesions. No palpable masses, nipple discharge or adenopathy bilaterally.  ABDOMEN: Soft. No tenderness or masses. No hernias. No CVA tenderness.  VULVA: No lesions. Normal female genitalia.  URETHRAL MEATUS: Normal size and location, no lesions, no prolapse.  URETHRA: No masses, tenderness, prolapse or scarring.  VAGINA: Moist and well rugated, no abnormal discharge, no significant cystocele or rectocele.  CERVIX: No lesions and discharge. PAP done.  UTERUS: Normal size, regular shape, mobile, non-tender, bladder base nontender.  ADNEXA: No masses,  tenderness or CDS nodularity.  ANUS PERINEUM: Normal.      Diagnosis:  1. Women's annual routine gynecological examination    2. Pap smear for cervical cancer screening    3. Screen for STD (sexually transmitted disease)          PLAN:    Orders Placed This Encounter    HPV High Risk Genotypes, PCR    C. trachomatis/N. gonorrhoeae by AMP DNA [facility]; Cervix    HIV 1/2 Ag/Ab (4th Gen)    RPR    Hepatitis Panel, Acute    Liquid-Based Pap Smear, Screening       Patient was counseled today on the need for annual gyn exams.  We will contact with results of the pap, culture, and labs.    Follow-up in 1 year.    Answers for HPI/ROS submitted by the patient on 3/24/2022  genital itching: No  genital lesions: No  genital odor: No  genital rash: No  missed menses: No  pelvic pain: No  vaginal bleeding: No  vaginal discharge: No  Pain severity: no pain  Pregnant now?: No  abdominal pain: No  anorexia: No  back pain: No  chills: No  constipation: No  diarrhea: No  discolored urine: No  dysuria: No  fever: No  flank pain: No  frequency: No  headaches: No  hematuria: No  nausea: No  painful intercourse: No  rash: No  urgency: No  vomiting: No  Vaginal bleeding: no bleeding  Passing clots?: No  Passing tissue?: No  Sexual activity: sexually active  Partner with STD symptoms: no  Menstrual history: regular  STD: No  abdominal surgery: No   section: No  Ectopic pregnancy: No  Endometriosis: No  herpes simplex: No  gynecological surgery: No  menorrhagia: No  metrorrhagia: No  miscarriage: No  ovarian cysts: No  perineal abscess: No  PID: No  terminated pregnancy: No  vaginosis: No

## 2022-03-31 ENCOUNTER — PATIENT MESSAGE (OUTPATIENT)
Dept: OBSTETRICS AND GYNECOLOGY | Facility: CLINIC | Age: 32
End: 2022-03-31
Payer: COMMERCIAL

## 2022-03-31 LAB
C TRACH DNA SPEC QL NAA+PROBE: NOT DETECTED
HAV IGM SERPL QL IA: NEGATIVE
HBV CORE IGM SERPL QL IA: NEGATIVE
HBV SURFACE AG SERPL QL IA: NEGATIVE
HCV AB SERPL QL IA: NEGATIVE
HIV 1+2 AB+HIV1 P24 AG SERPL QL IA: NEGATIVE
N GONORRHOEA DNA SPEC QL NAA+PROBE: NOT DETECTED
RPR SER QL: NORMAL

## 2022-04-01 ENCOUNTER — PATIENT MESSAGE (OUTPATIENT)
Dept: OBSTETRICS AND GYNECOLOGY | Facility: CLINIC | Age: 32
End: 2022-04-01
Payer: COMMERCIAL

## 2022-04-07 ENCOUNTER — PATIENT MESSAGE (OUTPATIENT)
Dept: OBSTETRICS AND GYNECOLOGY | Facility: CLINIC | Age: 32
End: 2022-04-07
Payer: COMMERCIAL

## 2022-12-02 ENCOUNTER — PATIENT MESSAGE (OUTPATIENT)
Dept: INTERNAL MEDICINE | Facility: CLINIC | Age: 32
End: 2022-12-02
Payer: COMMERCIAL

## 2022-12-02 DIAGNOSIS — J45.901 MODERATE ASTHMA WITH ACUTE EXACERBATION, UNSPECIFIED WHETHER PERSISTENT: Primary | ICD-10-CM

## 2022-12-08 RX ORDER — ALBUTEROL SULFATE 90 UG/1
1-2 AEROSOL, METERED RESPIRATORY (INHALATION) EVERY 4 HOURS PRN
Qty: 18 G | Refills: 2 | Status: SHIPPED | OUTPATIENT
Start: 2022-12-08 | End: 2022-12-13 | Stop reason: SDUPTHER

## 2022-12-13 ENCOUNTER — PATIENT MESSAGE (OUTPATIENT)
Dept: INTERNAL MEDICINE | Facility: CLINIC | Age: 32
End: 2022-12-13
Payer: COMMERCIAL

## 2022-12-13 DIAGNOSIS — J45.901 MODERATE ASTHMA WITH ACUTE EXACERBATION, UNSPECIFIED WHETHER PERSISTENT: ICD-10-CM

## 2022-12-13 RX ORDER — ALBUTEROL SULFATE 90 UG/1
1-2 AEROSOL, METERED RESPIRATORY (INHALATION) EVERY 4 HOURS PRN
Qty: 18 G | Refills: 2 | Status: SHIPPED | OUTPATIENT
Start: 2022-12-13 | End: 2022-12-16 | Stop reason: SDUPTHER

## 2022-12-13 RX ORDER — ALBUTEROL SULFATE 2.5 MG/.5ML
2.5 SOLUTION RESPIRATORY (INHALATION) EVERY 4 HOURS PRN
Qty: 1 EACH | Refills: 2 | Status: SHIPPED | OUTPATIENT
Start: 2022-12-13 | End: 2023-08-16 | Stop reason: SDUPTHER

## 2023-08-16 DIAGNOSIS — J45.901 MODERATE ASTHMA WITH ACUTE EXACERBATION, UNSPECIFIED WHETHER PERSISTENT: ICD-10-CM

## 2023-08-17 RX ORDER — ALBUTEROL SULFATE 90 UG/1
1-2 AEROSOL, METERED RESPIRATORY (INHALATION) EVERY 4 HOURS PRN
Qty: 18 G | Refills: 0 | Status: SHIPPED | OUTPATIENT
Start: 2023-08-17 | End: 2023-08-24

## 2023-08-17 RX ORDER — ALBUTEROL SULFATE 2.5 MG/.5ML
2.5 SOLUTION RESPIRATORY (INHALATION) EVERY 4 HOURS PRN
Qty: 1 EACH | Refills: 0 | Status: SHIPPED | OUTPATIENT
Start: 2023-08-17 | End: 2023-08-24

## 2023-08-24 ENCOUNTER — LAB VISIT (OUTPATIENT)
Dept: LAB | Facility: HOSPITAL | Age: 33
End: 2023-08-24
Payer: COMMERCIAL

## 2023-08-24 ENCOUNTER — OFFICE VISIT (OUTPATIENT)
Dept: INTERNAL MEDICINE | Facility: CLINIC | Age: 33
End: 2023-08-24
Payer: COMMERCIAL

## 2023-08-24 VITALS
SYSTOLIC BLOOD PRESSURE: 122 MMHG | DIASTOLIC BLOOD PRESSURE: 88 MMHG | HEART RATE: 90 BPM | HEIGHT: 63 IN | BODY MASS INDEX: 42.38 KG/M2 | WEIGHT: 239.19 LBS

## 2023-08-24 DIAGNOSIS — J45.901 MODERATE ASTHMA WITH ACUTE EXACERBATION, UNSPECIFIED WHETHER PERSISTENT: Primary | ICD-10-CM

## 2023-08-24 DIAGNOSIS — E66.9 OBESITY, UNSPECIFIED CLASSIFICATION, UNSPECIFIED OBESITY TYPE, UNSPECIFIED WHETHER SERIOUS COMORBIDITY PRESENT: ICD-10-CM

## 2023-08-24 DIAGNOSIS — Z00.00 ROUTINE GENERAL MEDICAL EXAMINATION AT A HEALTH CARE FACILITY: ICD-10-CM

## 2023-08-24 LAB
ALBUMIN SERPL BCP-MCNC: 3.8 G/DL (ref 3.5–5.2)
ALP SERPL-CCNC: 59 U/L (ref 55–135)
ALT SERPL W/O P-5'-P-CCNC: 13 U/L (ref 10–44)
ANION GAP SERPL CALC-SCNC: 7 MMOL/L (ref 8–16)
AST SERPL-CCNC: 17 U/L (ref 10–40)
BASOPHILS # BLD AUTO: 0.04 K/UL (ref 0–0.2)
BASOPHILS NFR BLD: 0.5 % (ref 0–1.9)
BILIRUB SERPL-MCNC: 0.3 MG/DL (ref 0.1–1)
BUN SERPL-MCNC: 13 MG/DL (ref 6–20)
CALCIUM SERPL-MCNC: 8.9 MG/DL (ref 8.7–10.5)
CHLORIDE SERPL-SCNC: 109 MMOL/L (ref 95–110)
CO2 SERPL-SCNC: 25 MMOL/L (ref 23–29)
CREAT SERPL-MCNC: 0.8 MG/DL (ref 0.5–1.4)
DIFFERENTIAL METHOD: ABNORMAL
EOSINOPHIL # BLD AUTO: 0.4 K/UL (ref 0–0.5)
EOSINOPHIL NFR BLD: 5.5 % (ref 0–8)
ERYTHROCYTE [DISTWIDTH] IN BLOOD BY AUTOMATED COUNT: 18.3 % (ref 11.5–14.5)
EST. GFR  (NO RACE VARIABLE): >60 ML/MIN/1.73 M^2
ESTIMATED AVG GLUCOSE: 108 MG/DL (ref 68–131)
GLUCOSE SERPL-MCNC: 84 MG/DL (ref 70–110)
HBA1C MFR BLD: 5.4 % (ref 4–5.6)
HCT VFR BLD AUTO: 28.2 % (ref 37–48.5)
HGB BLD-MCNC: 7.8 G/DL (ref 12–16)
IMM GRANULOCYTES # BLD AUTO: 0 K/UL (ref 0–0.04)
IMM GRANULOCYTES NFR BLD AUTO: 0 % (ref 0–0.5)
LYMPHOCYTES # BLD AUTO: 2.9 K/UL (ref 1–4.8)
LYMPHOCYTES NFR BLD: 38.6 % (ref 18–48)
MCH RBC QN AUTO: 20.2 PG (ref 27–31)
MCHC RBC AUTO-ENTMCNC: 27.7 G/DL (ref 32–36)
MCV RBC AUTO: 73 FL (ref 82–98)
MONOCYTES # BLD AUTO: 0.7 K/UL (ref 0.3–1)
MONOCYTES NFR BLD: 8.8 % (ref 4–15)
NEUTROPHILS # BLD AUTO: 3.5 K/UL (ref 1.8–7.7)
NEUTROPHILS NFR BLD: 46.6 % (ref 38–73)
NRBC BLD-RTO: 0 /100 WBC
PLATELET # BLD AUTO: 339 K/UL (ref 150–450)
PMV BLD AUTO: 11.7 FL (ref 9.2–12.9)
POTASSIUM SERPL-SCNC: 4.5 MMOL/L (ref 3.5–5.1)
PROT SERPL-MCNC: 8.1 G/DL (ref 6–8.4)
RBC # BLD AUTO: 3.86 M/UL (ref 4–5.4)
SODIUM SERPL-SCNC: 141 MMOL/L (ref 136–145)
WBC # BLD AUTO: 7.41 K/UL (ref 3.9–12.7)

## 2023-08-24 PROCEDURE — 3008F BODY MASS INDEX DOCD: CPT | Mod: CPTII,S$GLB,,

## 2023-08-24 PROCEDURE — 36415 COLL VENOUS BLD VENIPUNCTURE: CPT

## 2023-08-24 PROCEDURE — 1159F MED LIST DOCD IN RCRD: CPT | Mod: CPTII,S$GLB,,

## 2023-08-24 PROCEDURE — 1160F PR REVIEW ALL MEDS BY PRESCRIBER/CLIN PHARMACIST DOCUMENTED: ICD-10-PCS | Mod: CPTII,S$GLB,,

## 2023-08-24 PROCEDURE — 3079F PR MOST RECENT DIASTOLIC BLOOD PRESSURE 80-89 MM HG: ICD-10-PCS | Mod: CPTII,S$GLB,,

## 2023-08-24 PROCEDURE — 3074F SYST BP LT 130 MM HG: CPT | Mod: CPTII,S$GLB,,

## 2023-08-24 PROCEDURE — 99999 PR PBB SHADOW E&M-EST. PATIENT-LVL III: CPT | Mod: PBBFAC,,,

## 2023-08-24 PROCEDURE — 85025 COMPLETE CBC W/AUTO DIFF WBC: CPT

## 2023-08-24 PROCEDURE — 1160F RVW MEDS BY RX/DR IN RCRD: CPT | Mod: CPTII,S$GLB,,

## 2023-08-24 PROCEDURE — 83036 HEMOGLOBIN GLYCOSYLATED A1C: CPT

## 2023-08-24 PROCEDURE — 80053 COMPREHEN METABOLIC PANEL: CPT

## 2023-08-24 PROCEDURE — 1159F PR MEDICATION LIST DOCUMENTED IN MEDICAL RECORD: ICD-10-PCS | Mod: CPTII,S$GLB,,

## 2023-08-24 PROCEDURE — 99999 PR PBB SHADOW E&M-EST. PATIENT-LVL III: ICD-10-PCS | Mod: PBBFAC,,,

## 2023-08-24 PROCEDURE — 3044F HG A1C LEVEL LT 7.0%: CPT | Mod: CPTII,S$GLB,,

## 2023-08-24 PROCEDURE — 3008F PR BODY MASS INDEX (BMI) DOCUMENTED: ICD-10-PCS | Mod: CPTII,S$GLB,,

## 2023-08-24 PROCEDURE — 3044F PR MOST RECENT HEMOGLOBIN A1C LEVEL <7.0%: ICD-10-PCS | Mod: CPTII,S$GLB,,

## 2023-08-24 PROCEDURE — 3074F PR MOST RECENT SYSTOLIC BLOOD PRESSURE < 130 MM HG: ICD-10-PCS | Mod: CPTII,S$GLB,,

## 2023-08-24 PROCEDURE — 3079F DIAST BP 80-89 MM HG: CPT | Mod: CPTII,S$GLB,,

## 2023-08-24 PROCEDURE — 99214 OFFICE O/P EST MOD 30 MIN: CPT | Mod: S$GLB,,,

## 2023-08-24 PROCEDURE — 99214 PR OFFICE/OUTPT VISIT, EST, LEVL IV, 30-39 MIN: ICD-10-PCS | Mod: S$GLB,,,

## 2023-08-24 RX ORDER — ALBUTEROL SULFATE 2.5 MG/.5ML
2.5 SOLUTION RESPIRATORY (INHALATION) EVERY 4 HOURS PRN
Qty: 120 EACH | Refills: 11 | Status: SHIPPED | OUTPATIENT
Start: 2023-08-24 | End: 2023-09-06 | Stop reason: SDUPTHER

## 2023-08-24 RX ORDER — ALBUTEROL SULFATE 90 UG/1
1-2 AEROSOL, METERED RESPIRATORY (INHALATION) EVERY 4 HOURS PRN
Qty: 18 G | Refills: 11 | Status: SHIPPED | OUTPATIENT
Start: 2023-08-24 | End: 2023-09-06 | Stop reason: SDUPTHER

## 2023-08-24 RX ORDER — FLUTICASONE PROPIONATE AND SALMETEROL 250; 50 UG/1; UG/1
1 POWDER RESPIRATORY (INHALATION) 2 TIMES DAILY
Qty: 180 EACH | Refills: 11 | Status: SHIPPED | OUTPATIENT
Start: 2023-08-24 | End: 2023-09-06 | Stop reason: SDUPTHER

## 2023-08-24 NOTE — PROGRESS NOTES
Clinic Note  2023      Subjective:       Patient ID:  Molly is a 32 y.o. female pt with a PMH of asthma who comes in today for an annual  check-up and establishment of care. Pt reports that during the last 2 weeks that she has woken up wheezing everyday, needing her rescue inhaler for her symptoms to subside. She states that she performed a nebulizer treatment last Friday after experiencing a small exacerbation. She is currently uses Advair inhaler once everyday and albuterol inhaler as needed. She is compliant with her medication regime. Today she endorses wheezing, SOB, and occasional cough. She denies any fever, chills, or HA.    She was diagnosed with asthma last year, where she was hospitalized for 3 days. While in the hospital she was given breathing treatments that helped with her symptoms.     No other complaints today.      Past Medical History:   Diagnosis Date    Asthma        History reviewed. No pertinent surgical history.    Family History   Problem Relation Age of Onset    Breast cancer Neg Hx     Colon cancer Neg Hx     Ovarian cancer Neg Hx        Social History     Socioeconomic History    Marital status: Single   Tobacco Use    Smoking status: Former     Current packs/day: 0.00     Types: Cigars     Start date: 2019     Quit date: 2022     Years since quittin.6    Smokeless tobacco: Never   Substance and Sexual Activity    Alcohol use: Yes     Comment: special occasions    Drug use: Not Currently     Comment: marijuana in past    Sexual activity: Yes     Partners: Male     Birth control/protection: None   Social History Narrative    Lives alone, security work at hotel.     Social Determinants of Health     Financial Resource Strain: Low Risk  (2022)    Overall Financial Resource Strain (CARDIA)     Difficulty of Paying Living Expenses: Not very hard   Food Insecurity: No Food Insecurity (2022)    Hunger Vital Sign     Worried About Running Out of Food in the Last  Year: Never true     Ran Out of Food in the Last Year: Never true   Transportation Needs: No Transportation Needs (2/23/2022)    PRAPARE - Transportation     Lack of Transportation (Medical): No     Lack of Transportation (Non-Medical): No   Physical Activity: Inactive (2/23/2022)    Exercise Vital Sign     Days of Exercise per Week: 0 days     Minutes of Exercise per Session: 0 min   Stress: No Stress Concern Present (2/23/2022)    [employer] - Occupational Stress Questionnaire     Feeling of Stress : Only a little   Social Connections: Unknown (2/23/2022)    Social Connection and Isolation Panel [NHANES]     Frequency of Communication with Friends and Family: More than three times a week     Frequency of Social Gatherings with Friends and Family: More than three times a week     Attends Mandaeism Services: Never     Active Member of Clubs or Organizations: No     Attends Club or Organization Meetings: Never   Housing Stability: Low Risk  (2/23/2022)    Housing Stability Vital Sign     Unable to Pay for Housing in the Last Year: No     Number of Places Lived in the Last Year: 1     Unstable Housing in the Last Year: No       Review of Systems   Constitutional:  Negative for chills, fever and malaise/fatigue.   Respiratory:  Positive for cough, shortness of breath and wheezing.    Cardiovascular:  Positive for palpitations. Negative for chest pain.   Gastrointestinal:  Negative for abdominal pain, nausea and vomiting.   Musculoskeletal:  Negative for myalgias.   Neurological:  Negative for dizziness and headaches.       Medication List with Changes/Refills   Current Medications    ALBUTEROL (PROVENTIL) 2.5 MG /3 ML (0.083 %) NEBULIZER SOLUTION    Take by nebulization.    [employer] AEROSOL DELIVERY SYSTEM TRE    USE AS DIRECTED WITH SOLUTION   Changed and/or Refilled Medications    Modified Medication Previous Medication    ALBUTEROL (PROVENTIL/VENTOLIN HFA) 90 MCG/ACTUATION INHALER  "albuterol (PROVENTIL/VENTOLIN HFA) 90 mcg/actuation inhaler       Inhale 1-2 puffs into the lungs every 4 (four) hours as needed for Wheezing or Shortness of Breath. Rescue    Inhale 1-2 puffs into the lungs every 4 (four) hours as needed for Wheezing or Shortness of Breath. Rescue    ALBUTEROL SULFATE 2.5 MG/0.5 ML NEBU albuterol sulfate 2.5 mg/0.5 mL Nebu       Take 2.5 mg by nebulization every 4 (four) hours as needed (wheezing). Rescue    Take 2.5 mg by nebulization every 4 (four) hours as needed (wheezing). Rescue    FLUTICASONE-SALMETEROL DISKUS INHALER 250-50 MCG fluticasone-salmeterol diskus inhaler 250-50 mcg       Inhale 1 puff into the lungs 2 (two) times daily. Controller    Inhale 1 puff into the lungs 2 (two) times daily. Controller       Patient Active Problem List   Diagnosis    Moderate asthma with acute exacerbation               Objective:      /88 (BP Location: Left arm, Patient Position: Sitting, BP Method: Large (Manual))   Pulse 90   Ht 5' 3" (1.6 m)   Wt 108.5 kg (239 lb 3.2 oz)   BMI 42.37 kg/m²   Estimated body mass index is 42.37 kg/m² as calculated from the following:    Height as of this encounter: 5' 3" (1.6 m).    Weight as of this encounter: 108.5 kg (239 lb 3.2 oz).      Physical Exam  Constitutional:       Appearance: Normal appearance.   HENT:      Head: Normocephalic and atraumatic.      Mouth/Throat:      Mouth: Mucous membranes are dry.   Eyes:      Extraocular Movements: Extraocular movements intact.      Conjunctiva/sclera: Conjunctivae normal.   Cardiovascular:      Rate and Rhythm: Normal rate and regular rhythm.      Pulses: Normal pulses.      Heart sounds: Normal heart sounds.   Pulmonary:      Effort: Pulmonary effort is normal. No respiratory distress.      Breath sounds: Wheezing present.   Abdominal:      General: Abdomen is flat.      Palpations: Abdomen is soft.   Musculoskeletal:         General: No swelling or tenderness.   Skin:     General: Skin is " warm and dry.   Neurological:      General: No focal deficit present.      Mental Status: She is alert and oriented to person, place, and time.   Psychiatric:         Mood and Affect: Mood normal.           Assessment and Plan:         Problem List Items Addressed This Visit          Pulmonary    Moderate asthma with acute exacerbation    - 32 yr old F pt with a h/o asthma diagnosed last year where she was hospitalized for 3 days. She has not had any other acute exacerbations needing hospitalization since. Pt with new wheezing symptoms for the last 2 weeks that awake her from sleep, prompting her to use rescue albuterol inhaler. She uses Advair inhaler once daily          Plan - increase Advair inhaler use to 2 times a day to reduce symptoms                  - Refill medications with refills for a year.    Relevant Medications    albuterol (PROVENTIL/VENTOLIN HFA) 90 mcg/actuation inhaler    albuterol sulfate 2.5 mg/0.5 mL Nebu    fluticasone-salmeterol diskus inhaler 250-50 mcg     Other Visit Diagnoses       Routine general medical examination at a health care facility    -  Primary    Relevant Orders    CBC W/ AUTO DIFFERENTIAL    COMPREHENSIVE METABOLIC PANEL    HEMOGLOBIN A1C            Follow Up:       Diagnoses and all orders for this visit:    Routine general medical examination at a health care facility  -     CBC W/ AUTO DIFFERENTIAL; Future  -     COMPREHENSIVE METABOLIC PANEL; Future  -     HEMOGLOBIN A1C; Future    Moderate asthma with acute exacerbation, unspecified whether persistent  -     albuterol (PROVENTIL/VENTOLIN HFA) 90 mcg/actuation inhaler; Inhale 1-2 puffs into the lungs every 4 (four) hours as needed for Wheezing or Shortness of Breath. Rescue  -     albuterol sulfate 2.5 mg/0.5 mL Nebu; Take 2.5 mg by nebulization every 4 (four) hours as needed (wheezing). Rescue  -     fluticasone-salmeterol diskus inhaler 250-50 mcg; Inhale 1 puff into the lungs 2 (two) times daily.  Controller    Obesity, unspecified classification, unspecified obesity type, unspecified whether serious comorbidity present          -    Educated on diet and exercise            Interview, Assessment, Findings, and Plan discussed with Dr. Acharya    Follow up in about 3 months (around 11/24/2023).    [facility] [employer] MD PGY-1  Internal Medicine

## 2023-08-31 ENCOUNTER — PATIENT MESSAGE (OUTPATIENT)
Dept: INTERNAL MEDICINE | Facility: CLINIC | Age: 33
End: 2023-08-31
Payer: COMMERCIAL

## 2023-08-31 DIAGNOSIS — J45.901 MODERATE ASTHMA WITH ACUTE EXACERBATION, UNSPECIFIED WHETHER PERSISTENT: ICD-10-CM

## 2023-09-06 RX ORDER — FLUTICASONE PROPIONATE AND SALMETEROL 250; 50 UG/1; UG/1
1 POWDER RESPIRATORY (INHALATION) 2 TIMES DAILY
Qty: 180 EACH | Refills: 11 | Status: SHIPPED | OUTPATIENT
Start: 2023-09-06 | End: 2023-09-18 | Stop reason: SDUPTHER

## 2023-09-06 RX ORDER — ALBUTEROL SULFATE 2.5 MG/.5ML
2.5 SOLUTION RESPIRATORY (INHALATION) EVERY 4 HOURS PRN
Qty: 120 EACH | Refills: 11 | Status: SHIPPED | OUTPATIENT
Start: 2023-09-06 | End: 2023-09-18 | Stop reason: SDUPTHER

## 2023-09-06 RX ORDER — ALBUTEROL SULFATE 90 UG/1
1-2 AEROSOL, METERED RESPIRATORY (INHALATION) EVERY 4 HOURS PRN
Qty: 18 G | Refills: 11 | Status: SHIPPED | OUTPATIENT
Start: 2023-09-06 | End: 2023-09-18 | Stop reason: SDUPTHER

## 2023-09-18 ENCOUNTER — NURSE TRIAGE (OUTPATIENT)
Dept: ADMINISTRATIVE | Facility: CLINIC | Age: 33
End: 2023-09-18
Payer: COMMERCIAL

## 2023-09-18 RX ORDER — ALBUTEROL SULFATE 90 UG/1
1-2 AEROSOL, METERED RESPIRATORY (INHALATION) EVERY 4 HOURS PRN
Qty: 18 G | Refills: 11 | Status: SHIPPED | OUTPATIENT
Start: 2023-09-18 | End: 2023-12-22 | Stop reason: SDUPTHER

## 2023-09-18 RX ORDER — FLUTICASONE PROPIONATE AND SALMETEROL 250; 50 UG/1; UG/1
1 POWDER RESPIRATORY (INHALATION) 2 TIMES DAILY
Qty: 180 EACH | Refills: 11 | Status: SHIPPED | OUTPATIENT
Start: 2023-09-18 | End: 2023-12-22 | Stop reason: SDUPTHER

## 2023-09-18 RX ORDER — ALBUTEROL SULFATE 2.5 MG/.5ML
2.5 SOLUTION RESPIRATORY (INHALATION) EVERY 4 HOURS PRN
Qty: 120 EACH | Refills: 11 | Status: SHIPPED | OUTPATIENT
Start: 2023-09-18 | End: 2023-12-22 | Stop reason: SDUPTHER

## 2023-09-18 NOTE — TELEPHONE ENCOUNTER
Dr. Landa wrote order and pharmacy wanting to noted if premix was used instead of concentrate of albuterol nebulizer Tx.  Reason for Disposition   [1] Pharmacy calling with prescription question AND [2] triager unable to answer question    Protocols used: Medication Question Call-A-

## 2024-01-31 ENCOUNTER — OFFICE VISIT (OUTPATIENT)
Dept: OBSTETRICS AND GYNECOLOGY | Facility: CLINIC | Age: 34
End: 2024-01-31
Payer: COMMERCIAL

## 2024-01-31 VITALS
DIASTOLIC BLOOD PRESSURE: 74 MMHG | WEIGHT: 229.25 LBS | BODY MASS INDEX: 40.62 KG/M2 | SYSTOLIC BLOOD PRESSURE: 126 MMHG | HEIGHT: 63 IN

## 2024-01-31 DIAGNOSIS — Z12.4 PAP SMEAR FOR CERVICAL CANCER SCREENING: ICD-10-CM

## 2024-01-31 DIAGNOSIS — Z01.419 WOMEN'S ANNUAL ROUTINE GYNECOLOGICAL EXAMINATION: Primary | ICD-10-CM

## 2024-01-31 DIAGNOSIS — N92.0 MENORRHAGIA WITH REGULAR CYCLE: ICD-10-CM

## 2024-01-31 PROCEDURE — 1160F RVW MEDS BY RX/DR IN RCRD: CPT | Mod: CPTII,S$GLB,, | Performed by: OBSTETRICS & GYNECOLOGY

## 2024-01-31 PROCEDURE — 1159F MED LIST DOCD IN RCRD: CPT | Mod: CPTII,S$GLB,, | Performed by: OBSTETRICS & GYNECOLOGY

## 2024-01-31 PROCEDURE — 88142 CYTOPATH C/V THIN LAYER: CPT | Performed by: OBSTETRICS & GYNECOLOGY

## 2024-01-31 PROCEDURE — 99395 PREV VISIT EST AGE 18-39: CPT | Mod: S$GLB,,, | Performed by: OBSTETRICS & GYNECOLOGY

## 2024-01-31 PROCEDURE — 3008F BODY MASS INDEX DOCD: CPT | Mod: CPTII,S$GLB,, | Performed by: OBSTETRICS & GYNECOLOGY

## 2024-01-31 PROCEDURE — 3078F DIAST BP <80 MM HG: CPT | Mod: CPTII,S$GLB,, | Performed by: OBSTETRICS & GYNECOLOGY

## 2024-01-31 PROCEDURE — 87624 HPV HI-RISK TYP POOLED RSLT: CPT | Performed by: OBSTETRICS & GYNECOLOGY

## 2024-01-31 PROCEDURE — 3074F SYST BP LT 130 MM HG: CPT | Mod: CPTII,S$GLB,, | Performed by: OBSTETRICS & GYNECOLOGY

## 2024-01-31 PROCEDURE — 99999 PR PBB SHADOW E&M-EST. PATIENT-LVL III: CPT | Mod: PBBFAC,,, | Performed by: OBSTETRICS & GYNECOLOGY

## 2024-01-31 NOTE — PROGRESS NOTES
"Molly Aly is a 33 y.o. female  who presents for annual exam.  Menses occur monthly, lasting 5-7 days in duration, without intermenstrual bleeding.  For the past 3 months, she finds that her periods have become significantly heavier for the first 2 days.  At times, she uses double protection, changing every 2 hours.  Denies recent changes in her medical or surgical history.  Patient's last menstrual period was 2024 (exact date).    Blood pressure 126/74, height 5' 3" (1.6 m), weight 104 kg (229 lb 4.5 oz), last menstrual period 2024.    UPT today: negative    3/20/22 Pap: Negative, HPV: Negative    Past Medical History:   Diagnosis Date    Asthma        History reviewed. No pertinent surgical history.    OB History          0    Para   0    Term   0       0    AB   0    Living   0         SAB   0    IAB   0    Ectopic   0    Multiple   0    Live Births   0                 ROS:  GENERAL: Feeling well overall.   SKIN: Denies rash or lesions.   HEAD: Denies head injury or headache.   NODES: Denies enlarged lymph nodes.   CHEST: Denies chest pain or shortness of breath.   CARDIOVASCULAR: Denies palpitations or left sided chest pain.   ABDOMEN: No abdominal pain, nausea, vomiting or rectal bleeding.   URINARY: No dysuria or hematuria.  REPRODUCTIVE: See HPI.   BREASTS: Denies pain, lumps, or nipple discharge.   HEMATOLOGIC:  Reports heavy bleeding for the 1st 2 days of her period.  MUSCULOSKELETAL: Denies joint pain or swelling.   NEUROLOGIC: Denies syncope or weakness.   PSYCHIATRIC: Denies depression.    PE:   (chaperone present during entire exam)  APPEARANCE: Well nourished, well developed, in no acute distress.  BREASTS: Symmetrical, no skin changes or visible lesions. No palpable masses, nipple discharge or adenopathy bilaterally.  ABDOMEN: Soft. No tenderness or masses. No CVA tenderness.  VULVA: No lesions. Normal female genitalia.  URETHRAL MEATUS: Normal size and location, " no lesions, no prolapse.  URETHRA: No masses, tenderness, prolapse or scarring.  VAGINA: Moist and well rugated, no abnormal discharge, no significant cystocele or rectocele.  CERVIX: No lesions and discharge. PAP done.  UTERUS: Normal size, regular shape, mobile, non-tender, bladder base nontender.  ADNEXA: No masses, tenderness or CDS nodularity.  ANUS PERINEUM: Normal.      Diagnosis:  1. Women's annual routine gynecological examination    2. Pap smear for cervical cancer screening    3. Menorrhagia with regular cycle          PLAN:    Orders Placed This Encounter    HPV High Risk Genotypes, PCR    US Pelvis Comp with Transvag NON-OB (xpd    Liquid-Based Pap Smear, Screening       Patient was counseled today on the need for annual gyn exams.  We discussed the increased bleeding that she is now experiencing during her last 3 periods and the various etiologies.  She will have a pelvic ultrasound performed for additional evaluation.  We reviewed management options for heavy menses: medical versus surgical.    Follow-up after ultrasound.  Annual exam in one year    This note was created with voice recognition software.  Grammatical, syntax and spelling errors may be inevitable.

## 2024-02-01 ENCOUNTER — TELEPHONE (OUTPATIENT)
Dept: OBSTETRICS AND GYNECOLOGY | Facility: CLINIC | Age: 34
End: 2024-02-01
Payer: COMMERCIAL

## 2024-02-01 ENCOUNTER — HOSPITAL ENCOUNTER (OUTPATIENT)
Dept: RADIOLOGY | Facility: OTHER | Age: 34
Discharge: HOME | End: 2024-02-01
Attending: OBSTETRICS & GYNECOLOGY
Payer: COMMERCIAL

## 2024-02-01 ENCOUNTER — OFFICE VISIT (OUTPATIENT)
Dept: OBSTETRICS AND GYNECOLOGY | Facility: CLINIC | Age: 34
End: 2024-02-01
Attending: OBSTETRICS & GYNECOLOGY
Payer: COMMERCIAL

## 2024-02-01 VITALS
WEIGHT: 227.06 LBS | BODY MASS INDEX: 40.23 KG/M2 | SYSTOLIC BLOOD PRESSURE: 150 MMHG | DIASTOLIC BLOOD PRESSURE: 90 MMHG | HEIGHT: 63 IN

## 2024-02-01 DIAGNOSIS — Z11.3 SCREEN FOR STD (SEXUALLY TRANSMITTED DISEASE): Primary | ICD-10-CM

## 2024-02-01 DIAGNOSIS — N92.0 MENORRHAGIA WITH REGULAR CYCLE: ICD-10-CM

## 2024-02-01 PROCEDURE — 1159F MED LIST DOCD IN RCRD: CPT | Mod: CPTII,S$GLB,, | Performed by: OBSTETRICS & GYNECOLOGY

## 2024-02-01 PROCEDURE — 76830 TRANSVAGINAL US NON-OB: CPT | Mod: TC

## 2024-02-01 PROCEDURE — 99212 OFFICE O/P EST SF 10 MIN: CPT | Mod: S$GLB,,, | Performed by: OBSTETRICS & GYNECOLOGY

## 2024-02-01 PROCEDURE — 3008F BODY MASS INDEX DOCD: CPT | Mod: CPTII,S$GLB,, | Performed by: OBSTETRICS & GYNECOLOGY

## 2024-02-01 PROCEDURE — 1160F RVW MEDS BY RX/DR IN RCRD: CPT | Mod: CPTII,S$GLB,, | Performed by: OBSTETRICS & GYNECOLOGY

## 2024-02-01 PROCEDURE — 99999 PR PBB SHADOW E&M-EST. PATIENT-LVL III: CPT | Mod: PBBFAC,,, | Performed by: OBSTETRICS & GYNECOLOGY

## 2024-02-01 PROCEDURE — 87491 CHLMYD TRACH DNA AMP PROBE: CPT | Performed by: OBSTETRICS & GYNECOLOGY

## 2024-02-01 PROCEDURE — 76830 TRANSVAGINAL US NON-OB: CPT | Mod: 26,,, | Performed by: RADIOLOGY

## 2024-02-01 PROCEDURE — 76856 US EXAM PELVIC COMPLETE: CPT | Mod: 26,,, | Performed by: RADIOLOGY

## 2024-02-01 NOTE — TELEPHONE ENCOUNTER
Requests STD screening.  Urine from office visit yesterday unable to be processed.  She will go to lab today and provide urine specimen.

## 2024-02-01 NOTE — PROGRESS NOTES
"Chief Complaint   Patient presents with    STD CHECK       HPI:  Molly Aly is a 33 y.o. female patient  who presents today requesting STD screening.  Denies abnormal discharge.  She just had an ultrasound performed for evaluation of abnormal uterine bleeding.    Patient's last menstrual period was 2024 (exact date).    Blood pressure (!) 150/90, height 5' 3" (1.6 m), weight 103 kg (227 lb 1.2 oz), last menstrual period 2024.      Past Medical History:   Diagnosis Date    Asthma        History reviewed. No pertinent surgical history.      ROS:  GENERAL: Feeling well overall.   SKIN: Denies rash or lesions.   HEAD: Denies head injury or headache.   NODES: Denies enlarged lymph nodes.   CHEST: Denies chest pain or shortness of breath.   CARDIOVASCULAR: Denies palpitations or left sided chest pain.   ABDOMEN: No abdominal pain, nausea, vomiting or rectal bleeding.   URINARY: No dysuria or hematuria.  REPRODUCTIVE: See HPI.   BREASTS: Denies pain, lumps, or nipple discharge.   HEMATOLOGIC: Reports recently heavy periods.  MUSCULOSKELETAL: Denies joint pain or swelling.   NEUROLOGIC: Denies syncope or weakness.   PSYCHIATRIC: Denies depression.    PE:   (chaperone present during entire exam)  APPEARANCE: Well nourished, well developed, in no acute distress.  ABDOMEN: Soft. No tenderness or masses.  VULVA: No lesions. Normal female genitalia.  VAGINA: Moist and well rugated, no abnormal discharge.  CERVIX: No lesions and discharge.       Diagnosis:  1. Screen for STD (sexually transmitted disease)          PLAN:    Orders Placed This Encounter    C. trachomatis/N. gonorrhoeae by AMP DNA [facility]; Cervix    HIV 1/2 Ag/Ab (4th Gen)    RPR    Hepatitis Panel, Acute       Patient was counseled today on STD screening- we will contact her with results of the cervical culture and labs.  We will also contact her with results of her pelvic ultrasound when available.  We discussed her elevated BP and " recommendation to follow-up with her PCP.    Follow-up after testing.  Follow-up with her PCP for BP check    I spent a total of 16 minutes on the day of the visit.This includes face to face time and non-face to face time preparing to see the patient (eg, review of tests), Obtaining and/or reviewing separately obtained history, Documenting clinical information in the electronic or other health record, Independently interpreting resultsand communicating results to the patient/family/caregiver, or Care coordination.    This note was created with voice recognition software.  Grammatical, syntax and spelling errors may be inevitable.

## 2024-02-02 ENCOUNTER — PATIENT MESSAGE (OUTPATIENT)
Dept: OBSTETRICS AND GYNECOLOGY | Facility: CLINIC | Age: 34
End: 2024-02-02
Payer: COMMERCIAL

## 2024-02-05 ENCOUNTER — HOSPITAL ENCOUNTER (OUTPATIENT)
Facility: HOSPITAL | Age: 34
Discharge: HOME | End: 2024-02-06
Attending: EMERGENCY MEDICINE | Admitting: EMERGENCY MEDICINE
Payer: COMMERCIAL

## 2024-02-05 DIAGNOSIS — J45.901 MODERATE ASTHMA WITH ACUTE EXACERBATION, UNSPECIFIED WHETHER PERSISTENT: ICD-10-CM

## 2024-02-05 DIAGNOSIS — R07.89 CHEST TIGHTNESS: ICD-10-CM

## 2024-02-05 DIAGNOSIS — J45.901 ACUTE ASTHMA EXACERBATION: Primary | ICD-10-CM

## 2024-02-05 LAB
ALBUMIN SERPL BCP-MCNC: 4.1 G/DL (ref 3.5–5.2)
ALP SERPL-CCNC: 55 U/L (ref 55–135)
ALT SERPL W/O P-5'-P-CCNC: 13 U/L (ref 10–44)
ANION GAP SERPL CALC-SCNC: 13 MMOL/L (ref 8–16)
AST SERPL-CCNC: 18 U/L (ref 10–40)
B-HCG UR QL: NEGATIVE
BASOPHILS # BLD AUTO: 0.04 K/UL (ref 0–0.2)
BASOPHILS NFR BLD: 0.4 % (ref 0–1.9)
BILIRUB SERPL-MCNC: 0.5 MG/DL (ref 0.1–1)
BUN SERPL-MCNC: 7 MG/DL (ref 6–20)
CALCIUM SERPL-MCNC: 9.5 MG/DL (ref 8.7–10.5)
CHLORIDE SERPL-SCNC: 108 MMOL/L (ref 95–110)
CO2 SERPL-SCNC: 20 MMOL/L (ref 23–29)
CREAT SERPL-MCNC: 0.8 MG/DL (ref 0.5–1.4)
CTP QC/QA: YES
DIFFERENTIAL METHOD BLD: ABNORMAL
EOSINOPHIL # BLD AUTO: 0 K/UL (ref 0–0.5)
EOSINOPHIL NFR BLD: 0.1 % (ref 0–8)
ERYTHROCYTE [DISTWIDTH] IN BLOOD BY AUTOMATED COUNT: 19.5 % (ref 11.5–14.5)
EST. GFR  (NO RACE VARIABLE): >60 ML/MIN/1.73 M^2
GLUCOSE SERPL-MCNC: 126 MG/DL (ref 70–110)
HCT VFR BLD AUTO: 31.3 % (ref 37–48.5)
HGB BLD-MCNC: 9.1 G/DL (ref 12–16)
IMM GRANULOCYTES # BLD AUTO: 0.04 K/UL (ref 0–0.04)
IMM GRANULOCYTES NFR BLD AUTO: 0.4 % (ref 0–0.5)
INFLUENZA A, MOLECULAR: NOT DETECTED
INFLUENZA B, MOLECULAR: NOT DETECTED
LYMPHOCYTES # BLD AUTO: 0.5 K/UL (ref 1–4.8)
LYMPHOCYTES NFR BLD: 5.6 % (ref 18–48)
MCH RBC QN AUTO: 20.9 PG (ref 27–31)
MCHC RBC AUTO-ENTMCNC: 29.1 G/DL (ref 32–36)
MCV RBC AUTO: 72 FL (ref 82–98)
MONOCYTES # BLD AUTO: 0.1 K/UL (ref 0.3–1)
MONOCYTES NFR BLD: 0.7 % (ref 4–15)
NEUTROPHILS # BLD AUTO: 8.5 K/UL (ref 1.8–7.7)
NEUTROPHILS NFR BLD: 92.8 % (ref 38–73)
NRBC BLD-RTO: 0 /100 WBC
OHS QRS DURATION: 74 MS
OHS QTC CALCULATION: 461 MS
PLATELET # BLD AUTO: 326 K/UL (ref 150–450)
PMV BLD AUTO: 9.7 FL (ref 9.2–12.9)
POTASSIUM SERPL-SCNC: 3.5 MMOL/L (ref 3.5–5.1)
PROT SERPL-MCNC: 8.8 G/DL (ref 6–8.4)
RBC # BLD AUTO: 4.35 M/UL (ref 4–5.4)
RSV AG BY MOLECULAR METHOD: NOT DETECTED
SARS-COV-2 RNA RESP QL NAA+PROBE: NOT DETECTED
SODIUM SERPL-SCNC: 141 MMOL/L (ref 136–145)
TROPONIN I SERPL DL<=0.01 NG/ML-MCNC: <0.006 NG/ML (ref 0–0.03)
WBC # BLD AUTO: 9.11 K/UL (ref 3.9–12.7)

## 2024-02-05 PROCEDURE — 81025 URINE PREGNANCY TEST: CPT | Performed by: EMERGENCY MEDICINE

## 2024-02-05 PROCEDURE — 25000003 PHARM REV CODE 250: Performed by: PHYSICIAN ASSISTANT

## 2024-02-05 PROCEDURE — 99285 EMERGENCY DEPT VISIT HI MDM: CPT | Mod: 25

## 2024-02-05 PROCEDURE — 80053 COMPREHEN METABOLIC PANEL: CPT | Performed by: EMERGENCY MEDICINE

## 2024-02-05 PROCEDURE — 25000242 PHARM REV CODE 250 ALT 637 W/ HCPCS: Performed by: PHYSICIAN ASSISTANT

## 2024-02-05 PROCEDURE — 25000242 PHARM REV CODE 250 ALT 637 W/ HCPCS: Performed by: EMERGENCY MEDICINE

## 2024-02-05 PROCEDURE — 93010 ELECTROCARDIOGRAM REPORT: CPT | Mod: ,,, | Performed by: INTERNAL MEDICINE

## 2024-02-05 PROCEDURE — 94640 AIRWAY INHALATION TREATMENT: CPT

## 2024-02-05 PROCEDURE — 94640 AIRWAY INHALATION TREATMENT: CPT | Mod: XB

## 2024-02-05 PROCEDURE — 63600175 PHARM REV CODE 636 W HCPCS: Performed by: EMERGENCY MEDICINE

## 2024-02-05 PROCEDURE — 84484 ASSAY OF TROPONIN QUANT: CPT | Performed by: EMERGENCY MEDICINE

## 2024-02-05 PROCEDURE — 85025 COMPLETE CBC W/AUTO DIFF WBC: CPT | Performed by: EMERGENCY MEDICINE

## 2024-02-05 PROCEDURE — 96374 THER/PROPH/DIAG INJ IV PUSH: CPT

## 2024-02-05 PROCEDURE — G0378 HOSPITAL OBSERVATION PER HR: HCPCS

## 2024-02-05 PROCEDURE — 93005 ELECTROCARDIOGRAM TRACING: CPT

## 2024-02-05 PROCEDURE — 0241U SARS-COV2 (COVID) WITH FLU/RSV BY PCR: CPT | Performed by: EMERGENCY MEDICINE

## 2024-02-05 PROCEDURE — 94761 N-INVAS EAR/PLS OXIMETRY MLT: CPT

## 2024-02-05 RX ORDER — PREDNISONE 20 MG/1
60 TABLET ORAL DAILY
Status: DISCONTINUED | OUTPATIENT
Start: 2024-02-06 | End: 2024-02-06 | Stop reason: HOSPADM

## 2024-02-05 RX ORDER — ALBUTEROL SULFATE 2.5 MG/.5ML
15 SOLUTION RESPIRATORY (INHALATION)
Status: COMPLETED | OUTPATIENT
Start: 2024-02-05 | End: 2024-02-05

## 2024-02-05 RX ORDER — IPRATROPIUM BROMIDE 0.5 MG/2.5ML
1 SOLUTION RESPIRATORY (INHALATION)
Status: COMPLETED | OUTPATIENT
Start: 2024-02-05 | End: 2024-02-05

## 2024-02-05 RX ORDER — ACETAMINOPHEN 325 MG/1
650 TABLET ORAL EVERY 6 HOURS PRN
Status: DISCONTINUED | OUTPATIENT
Start: 2024-02-05 | End: 2024-02-06 | Stop reason: HOSPADM

## 2024-02-05 RX ORDER — DEXAMETHASONE SODIUM PHOSPHATE 4 MG/ML
4 INJECTION, SOLUTION INTRA-ARTICULAR; INTRALESIONAL; INTRAMUSCULAR; INTRAVENOUS; SOFT TISSUE
Status: COMPLETED | OUTPATIENT
Start: 2024-02-05 | End: 2024-02-05

## 2024-02-05 RX ORDER — PREDNISONE 20 MG/1
60 TABLET ORAL
Status: COMPLETED | OUTPATIENT
Start: 2024-02-05 | End: 2024-02-05

## 2024-02-05 RX ORDER — IPRATROPIUM BROMIDE AND ALBUTEROL SULFATE 2.5; .5 MG/3ML; MG/3ML
3 SOLUTION RESPIRATORY (INHALATION) EVERY 4 HOURS PRN
Status: DISCONTINUED | OUTPATIENT
Start: 2024-02-05 | End: 2024-02-06 | Stop reason: HOSPADM

## 2024-02-05 RX ORDER — IPRATROPIUM BROMIDE AND ALBUTEROL SULFATE 2.5; .5 MG/3ML; MG/3ML
3 SOLUTION RESPIRATORY (INHALATION) EVERY 4 HOURS
Status: DISCONTINUED | OUTPATIENT
Start: 2024-02-05 | End: 2024-02-06 | Stop reason: HOSPADM

## 2024-02-05 RX ORDER — SODIUM CHLORIDE 0.9 % (FLUSH) 0.9 %
10 SYRINGE (ML) INJECTION
Status: DISCONTINUED | OUTPATIENT
Start: 2024-02-05 | End: 2024-02-06 | Stop reason: HOSPADM

## 2024-02-05 RX ORDER — ONDANSETRON 4 MG/1
4 TABLET, ORALLY DISINTEGRATING ORAL EVERY 6 HOURS PRN
Status: DISCONTINUED | OUTPATIENT
Start: 2024-02-05 | End: 2024-02-06 | Stop reason: HOSPADM

## 2024-02-05 RX ADMIN — DEXAMETHASONE SODIUM PHOSPHATE 4 MG: 4 INJECTION INTRA-ARTICULAR; INTRALESIONAL; INTRAMUSCULAR; INTRAVENOUS; SOFT TISSUE at 02:02

## 2024-02-05 RX ADMIN — IPRATROPIUM BROMIDE 1 MG: 0.5 SOLUTION RESPIRATORY (INHALATION) at 10:02

## 2024-02-05 RX ADMIN — PREDNISONE 60 MG: 20 TABLET ORAL at 09:02

## 2024-02-05 RX ADMIN — ACETAMINOPHEN 650 MG: 325 TABLET ORAL at 10:02

## 2024-02-05 RX ADMIN — ALBUTEROL SULFATE 15 MG: 2.5 SOLUTION RESPIRATORY (INHALATION) at 12:02

## 2024-02-05 RX ADMIN — IPRATROPIUM BROMIDE AND ALBUTEROL SULFATE 3 ML: .5; 3 SOLUTION RESPIRATORY (INHALATION) at 08:02

## 2024-02-05 RX ADMIN — ALBUTEROL SULFATE 15 MG: 2.5 SOLUTION RESPIRATORY (INHALATION) at 10:02

## 2024-02-05 RX ADMIN — IPRATROPIUM BROMIDE 1 MG: 0.5 SOLUTION RESPIRATORY (INHALATION) at 12:02

## 2024-02-05 NOTE — ED NOTES
Patient identifiers verified and correct for  MS Aly  C/C:  SOB< SEE NN  APPEARANCE: awake and alert in NAD. PAIN  */10  SKIN: warm, dry and intact. No breakdown or bruising.  MUSCULOSKELETAL: Patient moving all extremities spontaneously, no obvious swelling or deformities noted. Ambulates independently.  RESPIRATORY: Positive hortness of breath.Respirations unlabored. Potive cough  CARDIAC: Denies CP, 2+ distal pulses; no peripheral edema  ABDOMEN: S/ND/NT, Denies nausea  : voids spontaneously, denies difficulty  Neurologic: AAO x 4; follows commands equal strength in all extremities; denies numbness/tingling. Denies dizziness Denies new wekaness reports onset headache

## 2024-02-05 NOTE — ED PROVIDER NOTES
Encounter Date: 2024       History     Chief Complaint   Patient presents with    Asthma     The patient is a 33-year-old female with a history of asthma who presents to the emergency department with a 2 day history of shortness of breath, wheezing, coughing, chest tightness, and back pain.  She states that her symptoms are consistent with previous asthma exacerbations.  This episode is severe.  It is consistent with her most recent exacerbation 2 years ago which required admission.  Her last steroid use was 2 years ago during that episode.  She denies a history of intubation.  She is unclear of the etiology of the exacerbation.  She has been taking albuterol inhaler and nebulizer treatments at home without relief of her symptoms.  She reports that she has also had some subjective fever yesterday.  She has no other complaints.      Review of patient's allergies indicates:  No Known Allergies  Past Medical History:   Diagnosis Date    Anemia     Asthma     Menorrhagia      History reviewed. No pertinent surgical history.  Family History   Problem Relation Age of Onset    Breast cancer Neg Hx     Colon cancer Neg Hx     Ovarian cancer Neg Hx      Social History     Tobacco Use    Smoking status: Former     Types: Cigars     Start date: 2019     Quit date: 2022     Years since quittin.0    Smokeless tobacco: Never   Substance Use Topics    Alcohol use: Yes     Comment: special occasions    Drug use: Not Currently     Comment: marijuana in past     Review of Systems  General:  Reports subjective fever.  Denies chills.  Denies generalized weakness.  HENT: Denies sore throat.  Denies earache.  Denies rhinorrhea.  Eyes: Denies visual changes.  Denies eye pain.  Denies drainage.  Cardiovascular:  Reports chest tightness.  Reports shortness of breath.  Denies orthopnea.  Reports dyspnea on exertion.  Respiratory:  Reports significant shortness of breath.  Reports wheezing.  Reports nonproductive  coughing.  GI: Denies abdominal pain.  Denies nausea.  Denies vomiting.  Denies diarrhea.  Denies constipation.    : Denies dysuria.  Denies hematuria.   Skin: Denies rashes.  Denies lesions.  Denies pallor.  Neuro: Denies headache.  Denies head trauma.  Denies numbness.  Denies focal weakness.  Musculoskeletal: Denies neck pain.  Reports upper back pain.  Denies extremity pain.  Denies extremity swelling.      Physical Exam     Initial Vitals [02/05/24 0903]   BP Pulse Resp Temp SpO2   (!) 166/94 108 (!) 22 98.8 °F (37.1 °C) 98 %      MAP       --         Physical Exam  General:  Moderate distress with shortness of breath.  Well-nourished.  Well-developed.  Alert and oriented x3.  HENT: Moist mucous membranes.  Normocephalic atraumatic.    Eyes: Pupils equally round and reactive to light.  Extraocular movements intact.  No scleral icterus.  No conjunctival pallor.  Cardiovascular:  Regular rhythm.  Tachycardia noted.  No murmurs, rubs, or gallops.  Brisk capillary fill.  2+ distal pulses.  Respiratory:  Moderate respiratory distress with tachypnea.  Decreased air movement noted.  Positive inspiratory and expiratory wheezing throughout.  No rales or rhonchi.  No significant accessory muscle use noted.  Abdomen: Soft.  Nontender.  Nondistended.  No guarding.  No rebound.  No masses.  No abdominal bruit auscultated.  Skin: No rashes.  No lesions.  No pallor.  No jaundice.  Neuro: Cranial nerves II through XII grossly intact.  Moving all extremities equally.  No sensory deficits.  Strength 5 out of 5 in all 4 extremities.  Musculoskeletal: Neck supple.  No extremity tenderness.  Moving all extremities without pain.  No back tenderness.  No neck tenderness.  Negative Homans sign.  No palpable cord.  No calf tenderness.      ED Course   Critical Care    Date/Time: 2/5/2024 1:54 PM    Performed by: Tanner Blancas MD  Authorized by: Tanner Blancas MD  Direct patient critical care time: 13 minutes  Additional history  critical care time: 4 minutes  Ordering / reviewing critical care time: 6 minutes  Documentation critical care time: 8 minutes  Consulting other physicians critical care time: 5 minutes  Total critical care time (exclusive of procedural time) : 36 minutes        Labs Reviewed   CBC W/ AUTO DIFFERENTIAL - Abnormal; Notable for the following components:       Result Value    Hemoglobin 9.1 (*)     Hematocrit 31.3 (*)     MCV 72 (*)     MCH 20.9 (*)     MCHC 29.1 (*)     RDW 19.5 (*)     Gran # (ANC) 8.5 (*)     Lymph # 0.5 (*)     Mono # 0.1 (*)     Gran % 92.8 (*)     Lymph % 5.6 (*)     Mono % 0.7 (*)     All other components within normal limits   COMPREHENSIVE METABOLIC PANEL - Abnormal; Notable for the following components:    CO2 20 (*)     Glucose 126 (*)     Total Protein 8.8 (*)     All other components within normal limits   SARS-COV2 (COVID) WITH FLU/RSV BY PCR   TROPONIN I   POCT URINE PREGNANCY        ECG Results              EKG 12-lead (Final result)        Collection Time Result Time QRS Duration OHS QTC Calculation    02/05/24 14:05:11 02/05/24 14:48:09 74 461                     Final result by Interface, Lab In [facility] (02/05/24 14:48:14)                   Narrative:    Test Reason : R07.89,    Vent. Rate : 119 BPM     Atrial Rate : 119 BPM     P-R Int : 128 ms          QRS Dur : 074 ms      QT Int : 328 ms       P-R-T Axes : 076 003 006 degrees     QTc Int : 461 ms    Sinus tachycardia  Nonspecific ST abnormality  Abnormal ECG  When compared with ECG of 05-FEB-2022 12:39,  Vent. rate has increased BY  39 BPM  Confirmed by Massimo Jay MD ([id number]) on 2/5/2024 2:48:02 PM    Referred By: [email or web address]   SELF           Confirmed By:Massimo Jay MD                                  Imaging Results              X-Ray Chest AP Portable (Final result)  Result time 02/05/24 11:25:59      Final result by Carlos Zuniga MD (02/05/24 11:25:59)                   Impression:      See above      Electronically  signed by: Carlos Zuniga MD  Date:    02/05/2024  Time:    11:25               Narrative:    EXAMINATION:  XR CHEST AP PORTABLE    CLINICAL HISTORY:  Asthma;    TECHNIQUE:  Single frontal view of the chest was performed.    COMPARISON:  No 02/05/2022 ne    FINDINGS:  Heart size normal.  The lungs are clear.  No pleural effusion                                       Medications   sodium chloride 0.9% flush 10 mL (has no administration in time range)   ondansetron disintegrating tablet 4 mg (has no administration in time range)   acetaminophen tablet 650 mg (has no administration in time range)   albuterol-ipratropium 2.5 mg-0.5 mg/3 mL nebulizer solution 3 mL (has no administration in time range)   predniSONE tablet 60 mg (has no administration in time range)   albuterol sulfate nebulizer solution 15 mg (15 mg Nebulization Given 2/5/24 1002)   ipratropium 0.02 % nebulizer solution 1 mg (1 mg Nebulization Given 2/5/24 1004)   predniSONE tablet 60 mg (60 mg Oral Given 2/5/24 0954)   albuterol sulfate nebulizer solution 15 mg (15 mg Nebulization Given 2/5/24 1248)   ipratropium 0.02 % nebulizer solution 1 mg (1 mg Nebulization Given 2/5/24 1249)   dexAMETHasone injection 4 mg (4 mg Intravenous Given 2/5/24 1421)     Medical Decision Making  This is an emergent evaluation of a potentially critically ill patient.  The patient is in respiratory distress with tachypnea, decreased air movement, and wheezing.  Continuous albuterol and Atrovent nebulizer treatments will be ordered.  I will provide the patient with oral prednisone.  A chest x-ray has been ordered to assess for pneumonia.  I will also check for flu and COVID.  I will reassess.    12:02 p.m.   Chest x-ray is clear.  The patient received a 1 hour nebulizer treatment and has been resting for the past hour.  Although she is moving air better, she continues to sound tight and have inspiratory and expiratory wheezing.  She thinks that she requires more nebulizer  treatments.  I concur with this.  Another 1 hour continuous treatment has been ordered.  I will reassess.    1:21 p.m.  The patient has received her 2nd hour of nebulizer treatments.  She remains tight and wheezing.  I suspect that she will require admission.  She is now also reporting some chest tightness that is worsening.  Laboratory studies and an IV have been ordered.  I will reassess.    1:52 p.m.  I discussed this case with the ALEX covering the emergency department observation unit.  Laboratory studies and EKG are pending.  I feel that the patient will require observation in order to allow the steroids to work and to receive regularly scheduled nebulizer treatments.  She appears clinically stable.  I will continue to monitor.    Amount and/or Complexity of Data Reviewed  Labs: ordered.  Radiology: ordered.    Risk  OTC drugs.  Prescription drug management.                                      Clinical Impression:  Final diagnoses:  [R07.89] Chest tightness  [J45.901] Acute asthma exacerbation (Primary)          ED Disposition Condition    Observation Stable                Tanner Blancas MD  02/05/24 1824

## 2024-02-05 NOTE — H&P
"ED Observation Unit  History and Physical      I assumed care of this patient from the Main ED at onset of observation time, 13:52 on 02/05/2024.       History of Present Illness:  Excerpt from ER attending physician:   "The patient is a 33-year-old female with a history of asthma who presents to the emergency department with a 2 day history of shortness of breath, wheezing, coughing, chest tightness, and back pain.  She states that her symptoms are consistent with previous asthma exacerbations.  This episode is severe.  It is consistent with her most recent exacerbation 2 years ago which required admission.  Her last steroid use was 2 years ago during that episode.  She denies a history of intubation.  She is unclear of the etiology of the exacerbation.  She has been taking albuterol inhaler and nebulizer treatments at home without relief of her symptoms.  She reports that she has also had some subjective fever yesterday.  She has no other complaints."    "2 hour-long cont nebs. Still tight. Will need to come in. Resp status appears rather good on eye test"         I reviewed the ED Provider Note dated 2/5/24 prior to my evaluation of this patient.  I reviewed all labs and imaging performed in the Main ED, prior to patient being placed in Observation. UPT negative. SARS/Covid negative. Chest Xray unremarkable. EKG with sinus tachycardia 119 bpm Non-specific ST abnormality. Meds given in ED: Prednisone 60 mg PO, Dexamethasone 4 mg IV, Albuterol - Ipratropium nebulizer solution via continuous inhalation treatments x 2. Additional labs ordered and pending: CBC, CMP, and Troponin.     The patient was placed in the ED Observation Unit for acute asthma exacerbation.     PMHx   Past Medical History:   Diagnosis Date    Asthma       History reviewed. No pertinent surgical history.     Family Hx   Family History   Problem Relation Age of Onset    Breast cancer Neg Hx     Colon cancer Neg Hx     Ovarian cancer Neg Hx     "     Social Hx   Social History     Socioeconomic History    Marital status: Single   Tobacco Use    Smoking status: Former     Types: Cigars     Start date: 2019     Quit date: 2022     Years since quittin.0    Smokeless tobacco: Never   Substance and Sexual Activity    Alcohol use: Yes     Comment: special occasions    Drug use: Not Currently     Comment: marijuana in past    Sexual activity: Yes     Partners: Male     Birth control/protection: None   Social History Narrative    Lives alone, security work at hotel.     Social Determinants of Health     Financial Resource Strain: Low Risk  (2022)    Overall Financial Resource Strain (CARDIA)     Difficulty of Paying Living Expenses: Not very hard   Food Insecurity: No Food Insecurity (2022)    Hunger Vital Sign     Worried About Running Out of Food in the Last Year: Never true     Ran Out of Food in the Last Year: Never true   Transportation Needs: No Transportation Needs (2022)    PRAPARE - Transportation     Lack of Transportation (Medical): No     Lack of Transportation (Non-Medical): No   Physical Activity: Inactive (2022)    Exercise Vital Sign     Days of Exercise per Week: 0 days     Minutes of Exercise per Session: 0 min   Stress: No Stress Concern Present (2022)    [employer] [place] [employer] - Occupational Stress Questionnaire     Feeling of Stress : Only a little   Social Connections: Unknown (2022)    Social Connection and Isolation Panel [NHANES]     Frequency of Communication with Friends and Family: More than three times a week     Frequency of Social Gatherings with Friends and Family: More than three times a week     Attends Restorationist Services: Never     Active Member of Clubs or Organizations: No     Attends Club or Organization Meetings: Never   Housing Stability: Low Risk  (2022)    Housing Stability Vital Sign     Unable to Pay for Housing in the Last Year: No     Number of Places Lived  in the Last Year: 1     Unstable Housing in the Last Year: No        Vital Signs   Vitals:    02/05/24 1108 02/05/24 1248 02/05/24 1249 02/05/24 1323   BP: (!) 140/77   (!) 170/98   Pulse: 110 100 100 (!) 122   Resp: 18 18 18 (!) 22   Temp: 99.1 °F (37.3 °C)   98.1 °F (36.7 °C)   TempSrc: Oral      SpO2: 95% 95% 95% 96%   Weight:       Height:            Review of Systems  Review of Systems   Constitutional:  Negative for chills, diaphoresis and fever.   HENT:  Negative for sore throat.    Eyes:  Negative for pain.   Respiratory:  Positive for cough, shortness of breath and wheezing. Negative for stridor.    Cardiovascular:  Negative for chest pain and palpitations.        (+) Chest tightness   Gastrointestinal:  Negative for abdominal pain, blood in stool, diarrhea, nausea and vomiting.   Genitourinary:  Negative for dysuria.   Musculoskeletal:  Positive for back pain. Negative for neck pain.   Skin:  Negative for rash.   Neurological:  Negative for dizziness, sensory change, speech change, focal weakness, seizures, loss of consciousness and headaches.   Psychiatric/Behavioral:  Negative for substance abuse.        Physical Exam  Physical Exam  Vitals and nursing note reviewed.   Constitutional:       Appearance: She is not ill-appearing or toxic-appearing.   HENT:      Head: Normocephalic.      Mouth/Throat:      Mouth: Mucous membranes are moist.   Eyes:      Conjunctiva/sclera: Conjunctivae normal.   Cardiovascular:      Pulses: Normal pulses.      Comments: (+) Tachycardia   Pulmonary:      Breath sounds: Wheezing present.      Comments: Coughing/wheezing   Abdominal:      Tenderness: There is no abdominal tenderness. There is no guarding.   Musculoskeletal:         General: No swelling or tenderness. Normal range of motion.      Cervical back: Neck supple.   Skin:     General: Skin is warm and dry.   Neurological:      General: No focal deficit present.      Mental Status: She is alert and oriented to person,  place, and time.   Psychiatric:         Mood and Affect: Mood normal.         Behavior: Behavior normal.         Medications:   Scheduled Meds:  Continuous Infusions:  PRN Meds:.acetaminophen, albuterol-ipratropium, ondansetron, sodium chloride 0.9%      Assessment/Plan:  Asthma exacerbation, acute:   - Cardiac monitoring   - Pulse oximetry   - Breathing treatments/Duonebs q 4 prn  - Corticosteroid daily   - follow pending CBC, CMP, and Troponin      Case was discussed with the ED provider, ER attending physician, Dr Blancas

## 2024-02-05 NOTE — Clinical Note
Diagnosis: Acute asthma exacerbation [[id number]]   Future Attending Provider: TERESA CASTILLO [[id number]]   Is the patient being sent to ED Observation?: Yes   Special Needs:: No Special Needs [1]

## 2024-02-05 NOTE — PROGRESS NOTES
ED Observation Unit  Progress Note      HPI   The patient is a 33-year-old female with a history of asthma who presents to the emergency department with a 2 day history of shortness of breath, wheezing, coughing, chest tightness, and back pain.  She states that her symptoms are consistent with previous asthma exacerbations.  This episode is severe.  It is consistent with her most recent exacerbation 2 years ago which required admission.  Her last steroid use was 2 years ago during that episode.  She denies a history of intubation.  She is unclear of the etiology of the exacerbation.  She has been taking albuterol inhaler and nebulizer treatments at home without relief of her symptoms.  She reports that she has also had some subjective fever yesterday.  She has no other complaints.    Interval History   Patient does report improvement of her symptoms thus far with ED management.  She states when she gets up to walk around, she continues to feel a little short of breath.    PMHx   Past Medical History:   Diagnosis Date    Anemia     Asthma     Menorrhagia       History reviewed. No pertinent surgical history.     Family Hx   Family History   Problem Relation Age of Onset    Breast cancer Neg Hx     Colon cancer Neg Hx     Ovarian cancer Neg Hx         Social Hx   Social History     Socioeconomic History    Marital status: Single   Tobacco Use    Smoking status: Former     Types: Cigars     Start date: 2019     Quit date: 2022     Years since quittin.0    Smokeless tobacco: Never   Substance and Sexual Activity    Alcohol use: Yes     Comment: special occasions    Drug use: Not Currently     Comment: marijuana in past    Sexual activity: Yes     Partners: Male     Birth control/protection: None   Social History Narrative    Lives alone, security work at hotel.     Social Determinants of Health     Financial Resource Strain: Low Risk  (2022)    Overall Financial Resource Strain (CARDIA)     Difficulty  of Paying Living Expenses: Not very hard   Food Insecurity: No Food Insecurity (2/23/2022)    Hunger Vital Sign     Worried About Running Out of Food in the Last Year: Never true     Ran Out of Food in the Last Year: Never true   Transportation Needs: No Transportation Needs (2/23/2022)    PRAPARE - Transportation     Lack of Transportation (Medical): No     Lack of Transportation (Non-Medical): No   Physical Activity: Inactive (2/23/2022)    Exercise Vital Sign     Days of Exercise per Week: 0 days     Minutes of Exercise per Session: 0 min   Stress: No Stress Concern Present (2/23/2022)    [employer] [place] [employer] - Occupational Stress Questionnaire     Feeling of Stress : Only a little   Social Connections: Unknown (2/23/2022)    Social Connection and Isolation Panel [NHANES]     Frequency of Communication with Friends and Family: More than three times a week     Frequency of Social Gatherings with Friends and Family: More than three times a week     Attends Muslim Services: Never     Active Member of Clubs or Organizations: No     Attends Club or Organization Meetings: Never   Housing Stability: Low Risk  (2/23/2022)    Housing Stability Vital Sign     Unable to Pay for Housing in the Last Year: No     Number of Places Lived in the Last Year: 1     Unstable Housing in the Last Year: No        Vital Signs   Vitals:    02/05/24 1249 02/05/24 1323 02/05/24 1532 02/05/24 1738   BP:  (!) 170/98 (!) 143/86 (!) 158/91   Pulse: 100 (!) 122 103 98   Resp: 18 (!) 22 (!) 6 16   Temp:  98.1 °F (36.7 °C) 98.9 °F (37.2 °C) 98.1 °F (36.7 °C)   TempSrc:   Oral Oral   SpO2: 95% 96% 96% 96%   Weight:       Height:            Review of Systems  Review of Systems   Respiratory:  Positive for shortness of breath and wheezing.        Brief Physical Exam/Reassessment   Physical Exam  Vitals and nursing note reviewed.   Constitutional:       General: She is not in acute distress.     Appearance: She is well-developed  and normal weight. She is not ill-appearing, toxic-appearing or diaphoretic.   HENT:      Head: Normocephalic and atraumatic.      Right Ear: External ear normal.      Left Ear: External ear normal.      Mouth/Throat:      Mouth: Mucous membranes are moist.      Pharynx: Oropharynx is clear.   Eyes:      Extraocular Movements: Extraocular movements intact.      Pupils: Pupils are equal, round, and reactive to light.   Cardiovascular:      Rate and Rhythm: Normal rate and regular rhythm.   Pulmonary:      Effort: Pulmonary effort is normal.      Breath sounds: Wheezing (faint expiratory wheeze, bilateral bases) present.   Abdominal:      General: Bowel sounds are normal.      Palpations: Abdomen is soft.      Tenderness: There is no abdominal tenderness.   Musculoskeletal:         General: Normal range of motion.      Cervical back: Normal range of motion and neck supple.      Right lower leg: No edema.      Left lower leg: No edema.   Skin:     General: Skin is warm and dry.      Capillary Refill: Capillary refill takes less than 2 seconds.   Neurological:      General: No focal deficit present.      Mental Status: She is alert and oriented to person, place, and time.   Psychiatric:         Mood and Affect: Mood normal. Mood is not anxious.         Behavior: Behavior normal. Behavior is not agitated.         Thought Content: Thought content normal.         Judgment: Judgment normal.         Labs/Imaging   Labs Reviewed   CBC W/ AUTO DIFFERENTIAL - Abnormal; Notable for the following components:       Result Value    Hemoglobin 9.1 (*)     Hematocrit 31.3 (*)     MCV 72 (*)     MCH 20.9 (*)     MCHC 29.1 (*)     RDW 19.5 (*)     Gran # (ANC) 8.5 (*)     Lymph # 0.5 (*)     Mono # 0.1 (*)     Gran % 92.8 (*)     Lymph % 5.6 (*)     Mono % 0.7 (*)     All other components within normal limits   COMPREHENSIVE METABOLIC PANEL - Abnormal; Notable for the following components:    CO2 20 (*)     Glucose 126 (*)     Total  Protein 8.8 (*)     All other components within normal limits   SARS-COV2 (COVID) WITH FLU/RSV BY PCR   TROPONIN I   POCT URINE PREGNANCY      Imaging Results              X-Ray Chest AP Portable (Final result)  Result time 02/05/24 11:25:59      Final result by Carlos Zuniga MD (02/05/24 11:25:59)                   Impression:      See above      Electronically signed by: Carlos Zuniga MD  Date:    02/05/2024  Time:    11:25               Narrative:    EXAMINATION:  XR CHEST AP PORTABLE    CLINICAL HISTORY:  Asthma;    TECHNIQUE:  Single frontal view of the chest was performed.    COMPARISON:  No 02/05/2022 ne    FINDINGS:  Heart size normal.  The lungs are clear.  No pleural effusion                                       I reviewed all labs, imaging, EKGs.     Plan   Asthma exacerbation, acute:   - Cardiac monitoring   - Pulse oximetry   - Breathing treatments/Duonebs q 4 hours prn  - Corticosteroid daily      I have discussed this case with GOLDY López.

## 2024-02-06 ENCOUNTER — PATIENT MESSAGE (OUTPATIENT)
Dept: OBSTETRICS AND GYNECOLOGY | Facility: CLINIC | Age: 34
End: 2024-02-06
Payer: COMMERCIAL

## 2024-02-06 ENCOUNTER — TELEPHONE (OUTPATIENT)
Dept: OBSTETRICS AND GYNECOLOGY | Facility: CLINIC | Age: 34
End: 2024-02-06
Payer: COMMERCIAL

## 2024-02-06 VITALS
TEMPERATURE: 99 F | RESPIRATION RATE: 18 BRPM | DIASTOLIC BLOOD PRESSURE: 93 MMHG | OXYGEN SATURATION: 100 % | BODY MASS INDEX: 40.22 KG/M2 | SYSTOLIC BLOOD PRESSURE: 146 MMHG | HEIGHT: 63 IN | WEIGHT: 227 LBS | HEART RATE: 117 BPM

## 2024-02-06 PROBLEM — J45.901 MODERATE ASTHMA WITH ACUTE EXACERBATION: Status: RESOLVED | Noted: 2022-02-23 | Resolved: 2024-02-06

## 2024-02-06 LAB
FINAL PATHOLOGIC DIAGNOSIS: NORMAL
Lab: NORMAL

## 2024-02-06 PROCEDURE — 96375 TX/PRO/DX INJ NEW DRUG ADDON: CPT

## 2024-02-06 PROCEDURE — G0378 HOSPITAL OBSERVATION PER HR: HCPCS

## 2024-02-06 PROCEDURE — 25000242 PHARM REV CODE 250 ALT 637 W/ HCPCS: Performed by: PHYSICIAN ASSISTANT

## 2024-02-06 PROCEDURE — 63600175 PHARM REV CODE 636 W HCPCS: Performed by: PHYSICIAN ASSISTANT

## 2024-02-06 PROCEDURE — 94640 AIRWAY INHALATION TREATMENT: CPT

## 2024-02-06 PROCEDURE — 63600175 PHARM REV CODE 636 W HCPCS

## 2024-02-06 PROCEDURE — 94761 N-INVAS EAR/PLS OXIMETRY MLT: CPT

## 2024-02-06 RX ORDER — PREDNISONE 20 MG/1
60 TABLET ORAL DAILY
Qty: 12 TABLET | Refills: 0 | Status: SHIPPED | OUTPATIENT
Start: 2024-02-07 | End: 2024-02-11

## 2024-02-06 RX ORDER — KETOROLAC TROMETHAMINE 30 MG/ML
15 INJECTION, SOLUTION INTRAMUSCULAR; INTRAVENOUS ONCE
Status: COMPLETED | OUTPATIENT
Start: 2024-02-06 | End: 2024-02-06

## 2024-02-06 RX ORDER — FLUTICASONE PROPIONATE AND SALMETEROL 250; 50 UG/1; UG/1
1 POWDER RESPIRATORY (INHALATION) 2 TIMES DAILY
Qty: 60 EACH | Refills: 11 | Status: SHIPPED | OUTPATIENT
Start: 2024-02-06 | End: 2024-04-04

## 2024-02-06 RX ADMIN — IPRATROPIUM BROMIDE AND ALBUTEROL SULFATE 3 ML: .5; 3 SOLUTION RESPIRATORY (INHALATION) at 03:02

## 2024-02-06 RX ADMIN — IPRATROPIUM BROMIDE AND ALBUTEROL SULFATE 3 ML: .5; 3 SOLUTION RESPIRATORY (INHALATION) at 12:02

## 2024-02-06 RX ADMIN — IPRATROPIUM BROMIDE AND ALBUTEROL SULFATE 3 ML: .5; 3 SOLUTION RESPIRATORY (INHALATION) at 08:02

## 2024-02-06 RX ADMIN — IPRATROPIUM BROMIDE AND ALBUTEROL SULFATE 3 ML: .5; 3 SOLUTION RESPIRATORY (INHALATION) at 04:02

## 2024-02-06 RX ADMIN — PREDNISONE 60 MG: 20 TABLET ORAL at 09:02

## 2024-02-06 RX ADMIN — KETOROLAC TROMETHAMINE 15 MG: 30 INJECTION, SOLUTION INTRAMUSCULAR; INTRAVENOUS at 01:02

## 2024-02-06 NOTE — ED NOTES
Pt care assumed. Report received by PARTH Brunner. Pt lying in stretcher in low and locked position and side rails raised x2. Call light, pt's belongings, and bedside table within pt's reach.  Pt in NAD and verbalized no needs at this time.

## 2024-02-06 NOTE — PROVIDER PROGRESS NOTES - EMERGENCY DEPT.
ED Observation Unit  Progress Note      HPI   The patient is a 33-year-old female with a history of asthma who presents to the emergency department with a 2 day history of shortness of breath, wheezing, coughing, chest tightness, and back pain.  She states that her symptoms are consistent with previous asthma exacerbations.  This episode is severe.  It is consistent with her most recent exacerbation 2 years ago which required admission.  Her last steroid use was 2 years ago during that episode.  She denies a history of intubation.  She is unclear of the etiology of the exacerbation.  She has been taking albuterol inhaler and nebulizer treatments at home without relief of her symptoms.  She reports that she has also had some subjective fever yesterday.  She has no other complaints.     Interval History   Patient seen and examined this morning at the bedside. VSSAF, labs unremarkable. Patient reports some improvement of SOB, but not completely resolved. Plan for 6MWT later today and discharge if continued improvement. Given toradol for HA.     PMHx   Past Medical History:   Diagnosis Date    Anemia     Asthma     Menorrhagia       History reviewed. No pertinent surgical history.     Family Hx   Family History   Problem Relation Age of Onset    Breast cancer Neg Hx     Colon cancer Neg Hx     Ovarian cancer Neg Hx         Social Hx   Social History     Socioeconomic History    Marital status: Single   Tobacco Use    Smoking status: Former     Types: Cigars     Start date: 2019     Quit date: 2022     Years since quittin.0    Smokeless tobacco: Never   Substance and Sexual Activity    Alcohol use: Yes     Comment: special occasions    Drug use: Not Currently     Comment: marijuana in past    Sexual activity: Yes     Partners: Male     Birth control/protection: None   Social History Narrative    Lives alone, security work at hotel.     Social Determinants of Health     Financial Resource Strain: Low Risk   (2/23/2022)    Overall Financial Resource Strain (CARDIA)     Difficulty of Paying Living Expenses: Not very hard   Food Insecurity: No Food Insecurity (2/23/2022)    Hunger Vital Sign     Worried About Running Out of Food in the Last Year: Never true     Ran Out of Food in the Last Year: Never true   Transportation Needs: No Transportation Needs (2/23/2022)    PRAPARE - Transportation     Lack of Transportation (Medical): No     Lack of Transportation (Non-Medical): No   Physical Activity: Inactive (2/23/2022)    Exercise Vital Sign     Days of Exercise per Week: 0 days     Minutes of Exercise per Session: 0 min   Stress: No Stress Concern Present (2/23/2022)    [employer] - Occupational Stress Questionnaire     Feeling of Stress : Only a little   Social Connections: Unknown (2/23/2022)    Social Connection and Isolation Panel [NHANES]     Frequency of Communication with Friends and Family: More than three times a week     Frequency of Social Gatherings with Friends and Family: More than three times a week     Attends Rastafarian Services: Never     Active Member of Clubs or Organizations: No     Attends Club or Organization Meetings: Never   Housing Stability: Low Risk  (2/23/2022)    Housing Stability Vital Sign     Unable to Pay for Housing in the Last Year: No     Number of Places Lived in the Last Year: 1     Unstable Housing in the Last Year: No        Vital Signs   Vitals:    02/06/24 0727 02/06/24 0809 02/06/24 1224 02/06/24 1241   BP: 135/82  (!) 143/85    BP Location: Right arm  Right arm    Patient Position: Lying  Lying    Pulse: 105 110 (!) 113 (!) 111   Resp:  20  18   Temp: 98.5 °F (36.9 °C)  98.5 °F (36.9 °C)    TempSrc: Oral  Oral    SpO2: 100% 98% 100% 98%   Weight:       Height:            Review of Systems  Review of Systems   Constitutional:  Negative for chills and fever.   HENT:  Negative for sinus pain and sore throat.    Respiratory:  Positive for shortness of  breath and wheezing. Negative for sputum production.    Cardiovascular:  Negative for chest pain.       Brief Physical Exam/Reassessment   Physical Exam  Constitutional:       Appearance: Normal appearance.   HENT:      Head: Normocephalic and atraumatic.      Nose: Nose normal.      Mouth/Throat:      Mouth: Mucous membranes are moist.   Eyes:      Extraocular Movements: Extraocular movements intact.   Cardiovascular:      Rate and Rhythm: Normal rate and regular rhythm.   Pulmonary:      Effort: Pulmonary effort is normal. No respiratory distress.      Breath sounds: Wheezing (faint bibasilar expiratory wheezing) present.   Neurological:      Mental Status: She is alert.   Psychiatric:         Mood and Affect: Mood normal.         Labs/Imaging   Labs Reviewed   CBC W/ AUTO DIFFERENTIAL - Abnormal; Notable for the following components:       Result Value    Hemoglobin 9.1 (*)     Hematocrit 31.3 (*)     MCV 72 (*)     MCH 20.9 (*)     MCHC 29.1 (*)     RDW 19.5 (*)     Gran # (ANC) 8.5 (*)     Lymph # 0.5 (*)     Mono # 0.1 (*)     Gran % 92.8 (*)     Lymph % 5.6 (*)     Mono % 0.7 (*)     All other components within normal limits   COMPREHENSIVE METABOLIC PANEL - Abnormal; Notable for the following components:    CO2 20 (*)     Glucose 126 (*)     Total Protein 8.8 (*)     All other components within normal limits   SARS-COV2 (COVID) WITH FLU/RSV BY PCR   TROPONIN I   POCT URINE PREGNANCY      Imaging Results              X-Ray Chest AP Portable (Final result)  Result time 02/05/24 11:25:59      Final result by Carlos Zuniga MD (02/05/24 11:25:59)                   Impression:      See above      Electronically signed by: Carlos Zuniga MD  Date:    02/05/2024  Time:    11:25               Narrative:    EXAMINATION:  XR CHEST AP PORTABLE    CLINICAL HISTORY:  Asthma;    TECHNIQUE:  Single frontal view of the chest was performed.    COMPARISON:  No 02/05/2022 ne    FINDINGS:  Heart size normal.  The lungs are  clear.  No pleural effusion                                       I reviewed all labs, imaging, EKGs.     Plan   Asthma exacerbation, acute:   - Cardiac monitoring   - Pulse oximetry   - Breathing treatments/Duonebs q 4 hours prn  - Corticosteroid augusto    I have discussed this case with Elvira Kaminski PA-C.

## 2024-02-06 NOTE — TELEPHONE ENCOUNTER
Called patient:    Discussed results of pelvic sono:    FINDINGS:  Uterus:  Size: 8.2 x 4.3 x 5.0 cm  Masses: None  Endometrium: Normal in thickness in this pre menopausal patient, measuring 12.3 mm.  There is diffuse heterogeneity of the endometrial stripe.  There is a more focal echogenic region within the fundus of the endometrial cavity and an endometrial polyp cannot be completely excluded.  Right ovary:  Size: 2.3 x 2.4 x 1.6 cm  Appearance: Normal  Vascular flow: Normal.  Left ovary:  Size: 2.9 x 1.7 x 3.3 cm  Appearance: Normal  Vascular Flow: Normal.  Free Fluid:  None.  Impression:  Abnormal appearance of the endometrial stripe which demonstrates diffuse heterogeneity with an echogenic focus within the fundal aspect of the endometrial cavity.  An endometrial polyp cannot be excluded.    Discussed abnormal appearing endometrium, possible endometrial polyp.  REC - EMBX, may need hysteroscopy.

## 2024-02-06 NOTE — NURSING
Discharge teaching done, patient made aware of med changes.  Awaiting delivery of new medication from pharmacy

## 2024-02-07 ENCOUNTER — PATIENT MESSAGE (OUTPATIENT)
Dept: OBSTETRICS AND GYNECOLOGY | Facility: CLINIC | Age: 34
End: 2024-02-07
Payer: COMMERCIAL

## 2024-02-07 LAB
C TRACH DNA SPEC QL NAA+PROBE: NOT DETECTED
N GONORRHOEA DNA SPEC QL NAA+PROBE: NOT DETECTED

## 2024-02-08 LAB
HPV HR 12 DNA SPEC QL NAA+PROBE: NEGATIVE
HPV16 AG SPEC QL: NEGATIVE
HPV18 DNA SPEC QL NAA+PROBE: NEGATIVE

## 2024-02-08 NOTE — DISCHARGE SUMMARY
ED Observation Unit  Discharge Summary        History of Present Illness:    The patient is a 33-year-old female with a history of asthma who presents to the emergency department with a 2 day history of shortness of breath, wheezing, coughing, chest tightness, and back pain.  She states that her symptoms are consistent with previous asthma exacerbations.  This episode is severe.  It is consistent with her most recent exacerbation 2 years ago which required admission.  Her last steroid use was 2 years ago during that episode.  She denies a history of intubation.  She is unclear of the etiology of the exacerbation.  She has been taking albuterol inhaler and nebulizer treatments at home without relief of her symptoms.  She reports that she has also had some subjective fever yesterday.  She has no other complaints.       Observation Course:    Patient was placed in the EDOU for an acute asthma exacerbation. Symptoms improved significantly with duoneb breathing treatments q4 hours and steroids. Patient passed 6MWT and is stable for discharge. Remainder of prednisone course and Advair inhaler delivered to the bedside. Patient stable for discharge. Instructed patient to follow-up with PCP within 1-2 weeks. Return precautions given and patient verbalized understanding. All questions answered.     Consultants:    N/a    Final Diagnosis:  Acute asthma exacerbation    Discharge Condition: Good    Disposition: Home or Self Care     Time spent on the discharge of the patient including review of hospital course with the patient. reviewing discharge medications and arranging follow-up care 35 minutes.  Patient was seen and examined on the date of discharge and determined to be suitable for discharge.    Follow Up:  No future appointments.

## 2024-04-04 ENCOUNTER — HOSPITAL ENCOUNTER (OUTPATIENT)
Facility: HOSPITAL | Age: 34
Discharge: HOME | End: 2024-04-07
Attending: EMERGENCY MEDICINE | Admitting: INTERNAL MEDICINE
Payer: COMMERCIAL

## 2024-04-04 DIAGNOSIS — J45.901 EXACERBATION OF ASTHMA, UNSPECIFIED ASTHMA SEVERITY, UNSPECIFIED WHETHER PERSISTENT: Primary | ICD-10-CM

## 2024-04-04 DIAGNOSIS — R06.00 DYSPNEA, UNSPECIFIED TYPE: ICD-10-CM

## 2024-04-04 DIAGNOSIS — R07.9 CHEST PAIN: ICD-10-CM

## 2024-04-04 DIAGNOSIS — R06.02 SOB (SHORTNESS OF BREATH): ICD-10-CM

## 2024-04-04 DIAGNOSIS — R07.89 CHEST TIGHTNESS: ICD-10-CM

## 2024-04-04 PROBLEM — E87.20 METABOLIC ACIDOSIS: Status: ACTIVE | Noted: 2024-04-04

## 2024-04-04 PROBLEM — E66.01 CLASS 3 SEVERE OBESITY WITH BODY MASS INDEX (BMI) OF 40.0 TO 44.9 IN ADULT: Status: ACTIVE | Noted: 2024-04-04

## 2024-04-04 PROBLEM — D64.9 ANEMIA: Status: ACTIVE | Noted: 2024-04-04

## 2024-04-04 PROBLEM — E66.813 CLASS 3 SEVERE OBESITY WITH BODY MASS INDEX (BMI) OF 40.0 TO 44.9 IN ADULT: Status: ACTIVE | Noted: 2024-04-04

## 2024-04-04 LAB
ALBUMIN SERPL BCP-MCNC: 4.1 G/DL (ref 3.5–5.2)
ALLENS TEST: ABNORMAL
ALP SERPL-CCNC: 67 U/L (ref 55–135)
ALT SERPL W/O P-5'-P-CCNC: 15 U/L (ref 10–44)
ANION GAP SERPL CALC-SCNC: 11 MMOL/L (ref 8–16)
AST SERPL-CCNC: 18 U/L (ref 10–40)
B-HCG UR QL: NEGATIVE
BASOPHILS # BLD AUTO: 0.07 K/UL (ref 0–0.2)
BASOPHILS NFR BLD: 0.9 % (ref 0–1.9)
BILIRUB SERPL-MCNC: 0.4 MG/DL (ref 0.1–1)
BUN SERPL-MCNC: 11 MG/DL (ref 6–20)
CALCIUM SERPL-MCNC: 9.5 MG/DL (ref 8.7–10.5)
CHLORIDE SERPL-SCNC: 108 MMOL/L (ref 95–110)
CO2 SERPL-SCNC: 21 MMOL/L (ref 23–29)
CREAT SERPL-MCNC: 0.8 MG/DL (ref 0.5–1.4)
CTP QC/QA: YES
D DIMER PPP IA.FEU-MCNC: 0.63 MG/L FEU
DIFFERENTIAL METHOD BLD: ABNORMAL
EOSINOPHIL # BLD AUTO: 0.3 K/UL (ref 0–0.5)
EOSINOPHIL NFR BLD: 3.6 % (ref 0–8)
ERYTHROCYTE [DISTWIDTH] IN BLOOD BY AUTOMATED COUNT: 19.5 % (ref 11.5–14.5)
EST. GFR  (NO RACE VARIABLE): >60 ML/MIN/1.73 M^2
FERRITIN SERPL-MCNC: 6 NG/ML (ref 20–300)
GLUCOSE SERPL-MCNC: 106 MG/DL (ref 70–110)
HCO3 UR-SCNC: 20.7 MMOL/L (ref 24–28)
HCT VFR BLD AUTO: 31.9 % (ref 37–48.5)
HGB BLD-MCNC: 8.9 G/DL (ref 12–16)
IMM GRANULOCYTES # BLD AUTO: 0.01 K/UL (ref 0–0.04)
IMM GRANULOCYTES NFR BLD AUTO: 0.1 % (ref 0–0.5)
INFLUENZA A, MOLECULAR: NEGATIVE
INFLUENZA B, MOLECULAR: NEGATIVE
IRON SERPL-MCNC: 18 UG/DL (ref 30–160)
LYMPHOCYTES # BLD AUTO: 1.3 K/UL (ref 1–4.8)
LYMPHOCYTES NFR BLD: 16.6 % (ref 18–48)
MCH RBC QN AUTO: 20.4 PG (ref 27–31)
MCHC RBC AUTO-ENTMCNC: 27.9 G/DL (ref 32–36)
MCV RBC AUTO: 73 FL (ref 82–98)
MONOCYTES # BLD AUTO: 0.6 K/UL (ref 0.3–1)
MONOCYTES NFR BLD: 7.3 % (ref 4–15)
NEUTROPHILS # BLD AUTO: 5.7 K/UL (ref 1.8–7.7)
NEUTROPHILS NFR BLD: 71.5 % (ref 38–73)
NRBC BLD-RTO: 0 /100 WBC
OHS QRS DURATION: 68 MS
OHS QTC CALCULATION: 455 MS
PCO2 BLDA: 41.2 MMHG (ref 35–45)
PH SMN: 7.31 [PH] (ref 7.35–7.45)
PLATELET # BLD AUTO: 279 K/UL (ref 150–450)
PMV BLD AUTO: 10.8 FL (ref 9.2–12.9)
PO2 BLDA: 27 MMHG (ref 40–60)
POC BE: -6 MMOL/L
POC SATURATED O2: 45 % (ref 95–100)
POC TCO2: 22 MMOL/L (ref 24–29)
POTASSIUM SERPL-SCNC: 4 MMOL/L (ref 3.5–5.1)
PROT SERPL-MCNC: 8.9 G/DL (ref 6–8.4)
RBC # BLD AUTO: 4.37 M/UL (ref 4–5.4)
SAMPLE: ABNORMAL
SARS-COV-2 RDRP RESP QL NAA+PROBE: NEGATIVE
SATURATED IRON: 3 % (ref 20–50)
SITE: ABNORMAL
SODIUM SERPL-SCNC: 140 MMOL/L (ref 136–145)
SPECIMEN SOURCE: NORMAL
TOTAL IRON BINDING CAPACITY: 605 UG/DL (ref 250–450)
TRANSFERRIN SERPL-MCNC: 409 MG/DL (ref 200–375)
WBC # BLD AUTO: 8 K/UL (ref 3.9–12.7)

## 2024-04-04 PROCEDURE — 82728 ASSAY OF FERRITIN: CPT

## 2024-04-04 PROCEDURE — 25500020 PHARM REV CODE 255: Performed by: EMERGENCY MEDICINE

## 2024-04-04 PROCEDURE — 94640 AIRWAY INHALATION TREATMENT: CPT | Mod: XB

## 2024-04-04 PROCEDURE — 83540 ASSAY OF IRON: CPT

## 2024-04-04 PROCEDURE — U0002 COVID-19 LAB TEST NON-CDC: HCPCS

## 2024-04-04 PROCEDURE — 27000221 HC OXYGEN, UP TO 24 HOURS

## 2024-04-04 PROCEDURE — 81025 URINE PREGNANCY TEST: CPT | Performed by: EMERGENCY MEDICINE

## 2024-04-04 PROCEDURE — 96366 THER/PROPH/DIAG IV INF ADDON: CPT

## 2024-04-04 PROCEDURE — 25000242 PHARM REV CODE 250 ALT 637 W/ HCPCS

## 2024-04-04 PROCEDURE — 63600175 PHARM REV CODE 636 W HCPCS

## 2024-04-04 PROCEDURE — G0378 HOSPITAL OBSERVATION PER HR: HCPCS

## 2024-04-04 PROCEDURE — 25000003 PHARM REV CODE 250

## 2024-04-04 PROCEDURE — 82803 BLOOD GASES ANY COMBINATION: CPT

## 2024-04-04 PROCEDURE — 94761 N-INVAS EAR/PLS OXIMETRY MLT: CPT | Mod: XB

## 2024-04-04 PROCEDURE — 99285 EMERGENCY DEPT VISIT HI MDM: CPT | Mod: 25

## 2024-04-04 PROCEDURE — 25000242 PHARM REV CODE 250 ALT 637 W/ HCPCS: Performed by: EMERGENCY MEDICINE

## 2024-04-04 PROCEDURE — 85379 FIBRIN DEGRADATION QUANT: CPT

## 2024-04-04 PROCEDURE — 85025 COMPLETE CBC W/AUTO DIFF WBC: CPT

## 2024-04-04 PROCEDURE — 80053 COMPREHEN METABOLIC PANEL: CPT

## 2024-04-04 PROCEDURE — 96375 TX/PRO/DX INJ NEW DRUG ADDON: CPT

## 2024-04-04 PROCEDURE — 99900035 HC TECH TIME PER 15 MIN (STAT)

## 2024-04-04 PROCEDURE — 96361 HYDRATE IV INFUSION ADD-ON: CPT

## 2024-04-04 PROCEDURE — 96365 THER/PROPH/DIAG IV INF INIT: CPT

## 2024-04-04 PROCEDURE — 87502 INFLUENZA DNA AMP PROBE: CPT

## 2024-04-04 RX ORDER — SODIUM CHLORIDE 9 MG/ML
1000 INJECTION, SOLUTION INTRAVENOUS
Status: COMPLETED | OUTPATIENT
Start: 2024-04-04 | End: 2024-04-04

## 2024-04-04 RX ORDER — CETIRIZINE HYDROCHLORIDE 5 MG/1
5 TABLET ORAL DAILY
Status: DISCONTINUED | OUTPATIENT
Start: 2024-04-05 | End: 2024-04-07 | Stop reason: HOSPADM

## 2024-04-04 RX ORDER — METHYLPREDNISOLONE SOD SUCC 125 MG
125 VIAL (EA) INJECTION
Status: COMPLETED | OUTPATIENT
Start: 2024-04-04 | End: 2024-04-04

## 2024-04-04 RX ORDER — IPRATROPIUM BROMIDE AND ALBUTEROL SULFATE 2.5; .5 MG/3ML; MG/3ML
3 SOLUTION RESPIRATORY (INHALATION)
Status: COMPLETED | OUTPATIENT
Start: 2024-04-04 | End: 2024-04-04

## 2024-04-04 RX ORDER — ALBUTEROL SULFATE 2.5 MG/.5ML
2.5 SOLUTION RESPIRATORY (INHALATION)
Status: COMPLETED | OUTPATIENT
Start: 2024-04-04 | End: 2024-04-04

## 2024-04-04 RX ORDER — LEVALBUTEROL INHALATION SOLUTION 0.31 MG/3ML
0.31 SOLUTION RESPIRATORY (INHALATION) EVERY 8 HOURS
Status: DISCONTINUED | OUTPATIENT
Start: 2024-04-04 | End: 2024-04-04

## 2024-04-04 RX ORDER — SIMETHICONE 80 MG
1 TABLET,CHEWABLE ORAL 4 TIMES DAILY PRN
Status: DISCONTINUED | OUTPATIENT
Start: 2024-04-04 | End: 2024-04-07 | Stop reason: HOSPADM

## 2024-04-04 RX ORDER — GLUCAGON 1 MG
1 KIT INJECTION
Status: DISCONTINUED | OUTPATIENT
Start: 2024-04-04 | End: 2024-04-07 | Stop reason: HOSPADM

## 2024-04-04 RX ORDER — NALOXONE HCL 0.4 MG/ML
0.02 VIAL (ML) INJECTION
Status: DISCONTINUED | OUTPATIENT
Start: 2024-04-04 | End: 2024-04-07 | Stop reason: HOSPADM

## 2024-04-04 RX ORDER — ONDANSETRON 8 MG/1
8 TABLET, ORALLY DISINTEGRATING ORAL EVERY 8 HOURS PRN
Status: DISCONTINUED | OUTPATIENT
Start: 2024-04-04 | End: 2024-04-07 | Stop reason: HOSPADM

## 2024-04-04 RX ORDER — MAGNESIUM SULFATE HEPTAHYDRATE 40 MG/ML
2 INJECTION, SOLUTION INTRAVENOUS ONCE
Status: COMPLETED | OUTPATIENT
Start: 2024-04-04 | End: 2024-04-04

## 2024-04-04 RX ORDER — ONDANSETRON HYDROCHLORIDE 2 MG/ML
4 INJECTION, SOLUTION INTRAVENOUS EVERY 8 HOURS PRN
Status: DISCONTINUED | OUTPATIENT
Start: 2024-04-04 | End: 2024-04-07 | Stop reason: HOSPADM

## 2024-04-04 RX ORDER — IBUPROFEN 200 MG
24 TABLET ORAL
Status: DISCONTINUED | OUTPATIENT
Start: 2024-04-04 | End: 2024-04-07 | Stop reason: HOSPADM

## 2024-04-04 RX ORDER — TALC
6 POWDER (GRAM) TOPICAL NIGHTLY PRN
Status: DISCONTINUED | OUTPATIENT
Start: 2024-04-04 | End: 2024-04-04

## 2024-04-04 RX ORDER — IPRATROPIUM BROMIDE AND ALBUTEROL SULFATE 2.5; .5 MG/3ML; MG/3ML
3 SOLUTION RESPIRATORY (INHALATION)
Status: CANCELLED | OUTPATIENT
Start: 2024-04-04 | End: 2024-04-04

## 2024-04-04 RX ORDER — GUAIFENESIN 600 MG/1
600 TABLET, EXTENDED RELEASE ORAL 2 TIMES DAILY
Status: DISCONTINUED | OUTPATIENT
Start: 2024-04-04 | End: 2024-04-07 | Stop reason: HOSPADM

## 2024-04-04 RX ORDER — IBUPROFEN 200 MG
16 TABLET ORAL
Status: DISCONTINUED | OUTPATIENT
Start: 2024-04-04 | End: 2024-04-07 | Stop reason: HOSPADM

## 2024-04-04 RX ORDER — IPRATROPIUM BROMIDE AND ALBUTEROL SULFATE 2.5; .5 MG/3ML; MG/3ML
9 SOLUTION RESPIRATORY (INHALATION) ONCE
Status: COMPLETED | OUTPATIENT
Start: 2024-04-04 | End: 2024-04-04

## 2024-04-04 RX ORDER — SODIUM CHLORIDE 0.9 % (FLUSH) 0.9 %
10 SYRINGE (ML) INJECTION EVERY 12 HOURS PRN
Status: DISCONTINUED | OUTPATIENT
Start: 2024-04-04 | End: 2024-04-04

## 2024-04-04 RX ORDER — IPRATROPIUM BROMIDE AND ALBUTEROL SULFATE 2.5; .5 MG/3ML; MG/3ML
3 SOLUTION RESPIRATORY (INHALATION)
Status: DISCONTINUED | OUTPATIENT
Start: 2024-04-04 | End: 2024-04-04

## 2024-04-04 RX ORDER — TALC
6 POWDER (GRAM) TOPICAL NIGHTLY PRN
Status: DISCONTINUED | OUTPATIENT
Start: 2024-04-04 | End: 2024-04-07 | Stop reason: HOSPADM

## 2024-04-04 RX ORDER — SODIUM CHLORIDE 0.9 % (FLUSH) 0.9 %
10 SYRINGE (ML) INJECTION
Status: DISCONTINUED | OUTPATIENT
Start: 2024-04-04 | End: 2024-04-07 | Stop reason: HOSPADM

## 2024-04-04 RX ORDER — ACETAMINOPHEN 325 MG/1
650 TABLET ORAL EVERY 8 HOURS PRN
Status: DISCONTINUED | OUTPATIENT
Start: 2024-04-04 | End: 2024-04-07 | Stop reason: HOSPADM

## 2024-04-04 RX ORDER — LEVALBUTEROL INHALATION SOLUTION 0.63 MG/3ML
0.63 SOLUTION RESPIRATORY (INHALATION) EVERY 8 HOURS
Status: DISCONTINUED | OUTPATIENT
Start: 2024-04-04 | End: 2024-04-07 | Stop reason: HOSPADM

## 2024-04-04 RX ORDER — LANOLIN ALCOHOL/MO/W.PET/CERES
1 CREAM (GRAM) TOPICAL DAILY
Status: DISCONTINUED | OUTPATIENT
Start: 2024-04-05 | End: 2024-04-07 | Stop reason: HOSPADM

## 2024-04-04 RX ORDER — ACETAMINOPHEN 325 MG/1
650 TABLET ORAL EVERY 4 HOURS PRN
Status: DISCONTINUED | OUTPATIENT
Start: 2024-04-04 | End: 2024-04-07 | Stop reason: HOSPADM

## 2024-04-04 RX ADMIN — IPRATROPIUM BROMIDE AND ALBUTEROL SULFATE 3 ML: 2.5; .5 SOLUTION RESPIRATORY (INHALATION) at 09:04

## 2024-04-04 RX ADMIN — IPRATROPIUM BROMIDE AND ALBUTEROL SULFATE 3 ML: 2.5; .5 SOLUTION RESPIRATORY (INHALATION) at 01:04

## 2024-04-04 RX ADMIN — IOHEXOL 100 ML: 350 INJECTION, SOLUTION INTRAVENOUS at 11:04

## 2024-04-04 RX ADMIN — SODIUM CHLORIDE 1000 ML: 9 INJECTION, SOLUTION INTRAVENOUS at 09:04

## 2024-04-04 RX ADMIN — METHYLPREDNISOLONE SODIUM SUCCINATE 125 MG: 125 INJECTION, POWDER, FOR SOLUTION INTRAMUSCULAR; INTRAVENOUS at 09:04

## 2024-04-04 RX ADMIN — ALBUTEROL SULFATE 2.5 MG: 2.5 SOLUTION RESPIRATORY (INHALATION) at 12:04

## 2024-04-04 RX ADMIN — MAGNESIUM SULFATE HEPTAHYDRATE 2 G: 40 INJECTION, SOLUTION INTRAVENOUS at 09:04

## 2024-04-04 RX ADMIN — IPRATROPIUM BROMIDE AND ALBUTEROL SULFATE 9 ML: 2.5; .5 SOLUTION RESPIRATORY (INHALATION) at 10:04

## 2024-04-04 RX ADMIN — LEVALBUTEROL HYDROCHLORIDE 0.63 MG: 0.63 SOLUTION RESPIRATORY (INHALATION) at 02:04

## 2024-04-04 RX ADMIN — LEVALBUTEROL HYDROCHLORIDE 0.63 MG: 0.63 SOLUTION RESPIRATORY (INHALATION) at 08:04

## 2024-04-04 RX ADMIN — GUAIFENESIN 600 MG: 600 TABLET, EXTENDED RELEASE ORAL at 08:04

## 2024-04-04 RX ADMIN — ACETAMINOPHEN 650 MG: 325 TABLET ORAL at 05:04

## 2024-04-04 NOTE — ASSESSMENT & PLAN NOTE
Patient presented to ED with wheezing and SOB refractory to home rescue inhaler use and nebulizer treatments. On exam patient was tachycardic, oxygenating >95% on 2 L NC. Wheezing noted across all lung fields.     -levalbuterol q8h 2/2 tachycardia   -cetirizine 5 mg PO daily  -guaifenesin 600 mg PO BID  -incentive spirometer at bedside  - prn oxygen to maintain saturation >95%  -pulse oximetry continuous  -tele d/t tachycardia  -VS q4hr  -consider steroids pending clinical course     ABG  Recent Labs   Lab 04/04/24  1301   PH 7.309*   PO2 27*   PCO2 41.2   HCO3 20.7*   BE -6*

## 2024-04-04 NOTE — ED PROVIDER NOTES
"Encounter Date: 2024       History     Chief Complaint   Patient presents with    Asthma     Reports began feeling asthma exacerbation last night. Pt. With audible wheezing in triage. States gave herself a breathing treatment and used rescue inhaler with no relief.      HPI    Ms. Aly is a 33 year old female with a PMH of anemia, asthma who is presenting to the ED with an asthma exacerbation. Patient reports that she was requiring her rescue inhaler yesterday night at home which is unusual for her. Patient reports that when she went home she used a nebulizer as well but felt SOB and "wheezy". Patient reports no recent URI. No new sick contacts. And no triggers that she can put her hands on. Patient reports it happened "randomly". Patient reports audible wheezing, and SOB. Upon evaluation, patient is noted to have audible wheezes, speaks in short sentences, tachycardic and hypertensive.     Patient denies chest pain, abdominal pain, dysuria, syncope, and confusion.   Review of patient's allergies indicates:  No Known Allergies  Past Medical History:   Diagnosis Date    Anemia     Asthma     Menorrhagia      No past surgical history on file.  Family History   Problem Relation Age of Onset    Breast cancer Neg Hx     Colon cancer Neg Hx     Ovarian cancer Neg Hx      Social History     Tobacco Use    Smoking status: Former     Types: Cigars     Start date: 2019     Quit date: 2022     Years since quittin.2    Smokeless tobacco: Never   Substance Use Topics    Alcohol use: Yes     Comment: special occasions    Drug use: Not Currently     Comment: marijuana in past     Review of Systems    Physical Exam     Initial Vitals [24 0830]   BP Pulse Resp Temp SpO2   (!) 182/115 (!) 119 (!) 24 98.1 °F (36.7 °C) 96 %      MAP       --         Physical Exam    Constitutional: She appears well-developed and well-nourished. She appears distressed.   HENT:   Head: Normocephalic and atraumatic.   Eyes: EOM are " normal.   Neck:   Normal range of motion.  Cardiovascular:  Regular rhythm.   Tachycardia present.         Pulmonary/Chest: Tachypnea noted. She is in respiratory distress.   Patient has audible wheezing upon evaluation.    Abdominal: Abdomen is soft and flat. Bowel sounds are normal.   Musculoskeletal:         General: Normal range of motion.      Cervical back: Normal range of motion.     Neurological: She is alert and oriented to person, place, and time.   Skin: Skin is warm.   Psychiatric: She has a normal mood and affect. Thought content normal.         ED Course   Procedures  Labs Reviewed - No data to display       Imaging Results    None          Medications - No data to display  Medical Decision Making  Ms. Aly is a 33 year old female with a PMH of anemia, asthma who is presenting to the ED with an asthma exacerbation. She is currently HDS and afebrile. She is tachypneic, wheezing but is protecting her airway. Patient is also AXO4. My differentials at this time are: Asthma exacerbation, Covid- 19, pneumonia, and anaphylaxis.     Workup will include D- dimer to assess need for CTA PE studies. No obvious triggers causing exacerbation. Will obtain Covid and flu tests to assess for infection. CBC and CMP will be included in workup to assess for abnormalities. Solumedrol, Mg, 1 L of fluids and Duo-nebs will be administered. Will reassess patient symptoms to assess response. Patient is oxygenating > 95 % on 2 L of NC.    Patient reports symptomatic improvement in response to the treatment. She reports some SOB and wheezing with positional changes. Will administer stacked albuterol and reassess her condition. Patient D dimer came back at 0.63 (elevated), will plan on ordering CTA to rule out PE. Will order nebulized albuterol due to continued wheezing and positional SOB. CTA revealed no definitive evidence for PE but study was sub optimal. Will plan to admit to hospital medicine for further workup of asthma  exacerbation.     Amount and/or Complexity of Data Reviewed  Labs: ordered.  Radiology: ordered.    Risk  OTC drugs.  Prescription drug management.              Attending Attestation:   Physician Attestation Statement for Resident:  As the supervising MD   Physician Attestation Statement: I have personally seen and examined this patient.   I agree with the above history.  -:   As the supervising MD I agree with the above PE.     As the supervising MD I agree with the above treatment, course, plan, and disposition.                                           Clinical Impression:                  Chantelle Nicole,   Resident  04/04/24 1211       Kim Sandoval MD  04/05/24 1099

## 2024-04-04 NOTE — ASSESSMENT & PLAN NOTE
Patient's anemia is currently uncontrolled. Has not received any PRBCs to date. Etiology likely BEBO/blood loss due to history of menorrhagia   Current CBC reviewed-   Lab Results   Component Value Date    HGB 8.9 (L) 04/04/2024    HCT 31.9 (L) 04/04/2024   - Monitor serial CBC and transfuse if patient becomes hemodynamically unstable, symptomatic or H/H drops below 7/21.  Lab Results   Component Value Date    FERRITIN 6 (L) 04/04/2024     Lab Results   Component Value Date    TRANSFERRIN 409 (H) 04/04/2024    TIBC 605 (H) 04/04/2024

## 2024-04-04 NOTE — ED NOTES
Nurses Note -- 4 Eyes      4/4/2024   3:37 PM      Skin assessed during: Admit      [x] No Altered Skin Integrity Present    []Prevention Measures Documented      [] Yes- Altered Skin Integrity Present or Discovered   [] LDA Added if Not in Epic (Describe Wound)   [] New Altered Skin Integrity was Present on Admit and Documented in LDA   [] Wound Image Taken    Wound Care Consulted? No    Attending Nurse:  Yonas Steward RN/Staff Member:   Valeria

## 2024-04-04 NOTE — ED TRIAGE NOTES
Pt arrives to ED via personal transportation c/o asthma exacerbation that began last night at work during rest. Pt states last breathing treatment was around 4 am and last albuterol inhaler was at 8 am before coming in to ED. Nasir TOSCANO.

## 2024-04-04 NOTE — ED NOTES
Telemetry Verification   Patient placed on Telemetry Box  Verified with War Room  Box # [id number]   Monitor Tech Warroom   Rate 115   Rhythm Sinus Tach

## 2024-04-04 NOTE — ASSESSMENT & PLAN NOTE
Body mass index is 40.21 kg/m². Morbid obesity complicates all aspects of disease management from diagnostic modalities to treatment. Weight loss encouraged and health benefits explained to patient.

## 2024-04-04 NOTE — SUBJECTIVE & OBJECTIVE
Past Medical History:   Diagnosis Date    Anemia     Asthma     Menorrhagia        No past surgical history on file.    Review of patient's allergies indicates:  No Known Allergies    No current facility-administered medications on file prior to encounter.     Current Outpatient Medications on File Prior to Encounter   Medication Sig    albuterol (PROVENTIL) 2.5 mg /3 mL (0.083 %) nebulizer solution Take by nebulization.    albuterol (PROVENTIL/VENTOLIN HFA) 90 mcg/actuation inhaler Inhale 1-2 puffs into the lungs every 4 (four) hours as needed for Wheezing or Shortness of Breath. Rescue    albuterol sulfate 2.5 mg/0.5 mL Nebu Take 2.5 mg by nebulization every 4 (four) hours as needed (wheezing). Rescue    fluticasone-salmeterol 250-50 mcg/dose (WIXELA INHUB) 250-50 mcg/dose diskus inhaler Inhale 1 puff into the lungs 2 (two) times a day. Controller    fluticasone-salmeterol diskus inhaler 250-50 mcg Inhale 1 puff into the lungs 2 (two) times daily. Controller    VIOS AEROSOL DELIVERY SYSTEM Fanny USE AS DIRECTED WITH SOLUTION     Family History    None       Tobacco Use    Smoking status: Former     Types: Cigars     Start date: 2019     Quit date: 2022     Years since quittin.2    Smokeless tobacco: Never   Substance and Sexual Activity    Alcohol use: Yes     Comment: special occasions    Drug use: Not Currently     Comment: marijuana in past    Sexual activity: Yes     Partners: Male     Birth control/protection: None     Review of Systems   Constitutional:  Negative for chills, diaphoresis and fever.   HENT:  Positive for rhinorrhea. Negative for congestion, postnasal drip, sinus pressure and sore throat.    Eyes:  Negative for discharge.   Respiratory:  Positive for cough (Patient reports nonproductive cough that began in ED following duonebs), shortness of breath (improved with duonebs) and wheezing.    Gastrointestinal:  Negative for abdominal distention, diarrhea, nausea and vomiting.    Genitourinary:  Negative for difficulty urinating, dysuria and hematuria.   Musculoskeletal:  Negative for arthralgias and myalgias.   Skin:  Negative for pallor.   Neurological:  Positive for headaches. Negative for dizziness, syncope, weakness and light-headedness.   Psychiatric/Behavioral:  Negative for agitation, confusion and decreased concentration.      Objective:     Vital Signs (Most Recent):  Temp: 99 °F (37.2 °C) (04/04/24 1227)  Pulse: (!) 130 (04/04/24 1330)  Resp: (!) 22 (04/04/24 1330)  BP: (!) 154/82 (04/04/24 1330)  SpO2: 98 % (04/04/24 1330) Vital Signs (24h Range):  Temp:  [98.1 °F (36.7 °C)-99 °F (37.2 °C)] 99 °F (37.2 °C)  Pulse:  [110-130] 130  Resp:  [20-30] 22  SpO2:  [96 %-100 %] 98 %  BP: (146-182)/() 154/82     Weight: 103 kg (227 lb)  Body mass index is 40.21 kg/m².     Physical Exam  Constitutional:       General: She is awake.      Appearance: She is well-groomed. She is obese. She is not diaphoretic.      Interventions: Nasal cannula in place.      Comments: Patient not in acute distress. Appears comfortable.    HENT:      Head: Normocephalic and atraumatic.   Eyes:      General: No scleral icterus.     Extraocular Movements: Extraocular movements intact.   Cardiovascular:      Rate and Rhythm: Regular rhythm. Tachycardia present.      Pulses: Normal pulses.           Radial pulses are 2+ on the right side and 2+ on the left side.   Pulmonary:      Effort: Pulmonary effort is normal. No accessory muscle usage, prolonged expiration, respiratory distress or retractions.      Breath sounds: Wheezing (wheezing noted across all lung fields.) present. No rhonchi or rales.      Comments: No accessory muscle use. No increased work of breathing. Patient able to speak in full complete sentences without difficulty. Patient oxygenating >95% on 2 L NC. Patient maintaining airway.  Chest:      Chest wall: No tenderness.   Abdominal:      General: Abdomen is flat. There is no distension.    Musculoskeletal:      Cervical back: Normal range of motion.      Right lower leg: No edema.      Left lower leg: No edema.   Skin:     General: Skin is warm and dry.      Coloration: Skin is not pale.   Neurological:      General: No focal deficit present.      Mental Status: She is alert and oriented to person, place, and time.      Motor: No weakness.   Psychiatric:         Mood and Affect: Mood normal.         Behavior: Behavior normal. Behavior is cooperative.         Thought Content: Thought content normal.         Judgment: Judgment normal.       Significant Labs: All pertinent labs within the past 24 hours have been reviewed.  CBC:   Recent Labs   Lab 04/04/24  0915   WBC 8.00   HGB 8.9*   HCT 31.9*        CMP:   Recent Labs   Lab 04/04/24  0915      K 4.0      CO2 21*      BUN 11   CREATININE 0.8   CALCIUM 9.5   PROT 8.9*   ALBUMIN 4.1   BILITOT 0.4   ALKPHOS 67   AST 18   ALT 15   ANIONGAP 11       Significant Imaging: I have reviewed all pertinent imaging results/findings within the past 24 hours.  Imaging Results              CTA Chest Non-Coronary (PE Studies) (Final result)  Result time 04/04/24 11:52:22      Final result by Kelsi Carmona MD (04/04/24 11:52:22)                   Impression:      Suboptimal opacification of the pulmonary arterial system, no definite the central pulmonary embolus.    No definite acute intrathoracic process seen.      Electronically signed by: Kelsi Carmona MD  Date:    04/04/2024  Time:    11:52               Narrative:    EXAMINATION:  CTA CHEST NON CORONARY (PE STUDIES)    CLINICAL HISTORY:  Pulmonary embolism (PE) suspected, positive D-dimer;    TECHNIQUE:  Low dose axial images, sagittal and coronal reformations were obtained from the thoracic inlet to the lung bases following the IV administration of 100 mL of Omnipaque 350.  Contrast timing was optimized to evaluate the pulmonary arteries.  MIP images were  performed.    COMPARISON:  None    FINDINGS:  Vasculature: There is fair opacification of the pulmonary arterial system.  There is significant breathing motion artifacts at the lung basis and lung apices.  No acute central pulmonary embolus identified. Thoracic aorta normal caliber. No evidence of dissection.    Lungs: The tracheobronchial tree is clear. No lung consolidation. No emphysematous lung changes.No pleural effusion.    Mediastinum: No lymphadenopathy.  The cardiac silhouette is normal in size.No pericardial effusion.  The esophagus courses normally.    Upper abdomen (visualized portion):No abnormality seen.    Bones/chest wall: No marrow replacement process.

## 2024-04-04 NOTE — H&P
[facility]  Hospital Medicine  History & Physical    Patient Name: Molly Aly  MRN: [id number]  Patient Class: OP- Observation  Admission Date: 4/4/2024  Attending Physician: Charlie Donato MD   Primary Care Provider: Best Light MD         Patient information was obtained from patient, past medical records, and ER records.     Subjective:     Principal Problem:Acute asthma exacerbation    Chief Complaint:   Chief Complaint   Patient presents with    Asthma     Reports began feeling asthma exacerbation last night. Pt. With audible wheezing in triage. States gave herself a breathing treatment and used rescue inhaler with no relief.         HPI: Molly Aly is a 33 y.o. female with history of asthma and anemia being admitted to hospital medicine for acute asthma exacerbation. Patient reports sudden onset of shortness of breath and wheezing while at work yesterday. Endorses associated recent sinus congestion and rhinorrhea associated with seasonal allergies. She used her albuterol inhaler, travel nebulizer and home nebulizer treatment without significant improvement in symptoms. Reports her asthma is typically triggered by strong scents but denies recent exposure; feels symptoms are related to allergies. Asthma is managed by her PCP, does not have a pulmonologist.  Does also have a frontal, pressure-like headache. No fever, chills, vision changes, numbness/weakness, abdominal pain, dysuria, hematuria or chest pain.       In ED patient was tachypnic (24), tachycardic (119), and hypertensive (182/115). CXR without evidence of acute pulmonary processes such as pneumonia, pneumothorax, or pleural effusion. CBC with Hemoglobin 8.9, hematocrit 31.9. CMP with CO2 21. Flu and Covid negative.D-dimer was elevated at 0.63. CTA performed without evidence of PE. Patient placed was on 2 L of NC and was oxygenating >95%.  Patient was given Solumedrol, Mg, 1 L of fluids, and duo-nebs with improvement of  symptoms. Admitted to hospital medicine for further management.       Past Medical History:   Diagnosis Date    Anemia     Asthma     Menorrhagia        No past surgical history on file.    Review of patient's allergies indicates:  No Known Allergies    No current facility-administered medications on file prior to encounter.     Current Outpatient Medications on File Prior to Encounter   Medication Sig    albuterol (PROVENTIL) 2.5 mg /3 mL (0.083 %) nebulizer solution Take by nebulization.    albuterol (PROVENTIL/VENTOLIN HFA) 90 mcg/actuation inhaler Inhale 1-2 puffs into the lungs every 4 (four) hours as needed for Wheezing or Shortness of Breath. Rescue    albuterol sulfate 2.5 mg/0.5 mL Nebu Take 2.5 mg by nebulization every 4 (four) hours as needed (wheezing). Rescue    fluticasone-salmeterol 250-50 mcg/dose (WIXELA INHUB) 250-50 mcg/dose diskus inhaler Inhale 1 puff into the lungs 2 (two) times a day. Controller    fluticasone-salmeterol diskus inhaler 250-50 mcg Inhale 1 puff into the lungs 2 (two) times daily. Controller    VIOS AEROSOL DELIVERY SYSTEM Fanny USE AS DIRECTED WITH SOLUTION     Family History    None       Tobacco Use    Smoking status: Former     Types: Cigars     Start date: 2019     Quit date: 2022     Years since quittin.2    Smokeless tobacco: Never   Substance and Sexual Activity    Alcohol use: Yes     Comment: special occasions    Drug use: Not Currently     Comment: marijuana in past    Sexual activity: Yes     Partners: Male     Birth control/protection: None     Review of Systems   Constitutional:  Negative for chills, diaphoresis and fever.   HENT:  Positive for rhinorrhea. Negative for congestion, postnasal drip, sinus pressure and sore throat.    Eyes:  Negative for discharge.   Respiratory:  Positive for cough (Patient reports nonproductive cough that began in ED following duonebs), shortness of breath (improved with duonebs) and wheezing.    Gastrointestinal:   Negative for abdominal distention, diarrhea, nausea and vomiting.   Genitourinary:  Negative for difficulty urinating, dysuria and hematuria.   Musculoskeletal:  Negative for arthralgias and myalgias.   Skin:  Negative for pallor.   Neurological:  Positive for headaches. Negative for dizziness, syncope, weakness and light-headedness.   Psychiatric/Behavioral:  Negative for agitation, confusion and decreased concentration.      Objective:     Vital Signs (Most Recent):  Temp: 99 °F (37.2 °C) (04/04/24 1227)  Pulse: (!) 130 (04/04/24 1330)  Resp: (!) 22 (04/04/24 1330)  BP: (!) 154/82 (04/04/24 1330)  SpO2: 98 % (04/04/24 1330) Vital Signs (24h Range):  Temp:  [98.1 °F (36.7 °C)-99 °F (37.2 °C)] 99 °F (37.2 °C)  Pulse:  [110-130] 130  Resp:  [20-30] 22  SpO2:  [96 %-100 %] 98 %  BP: (146-182)/() 154/82     Weight: 103 kg (227 lb)  Body mass index is 40.21 kg/m².     Physical Exam  Constitutional:       General: She is awake.      Appearance: She is well-groomed. She is obese. She is not diaphoretic.      Interventions: Nasal cannula in place.      Comments: Patient not in acute distress. Appears comfortable.    HENT:      Head: Normocephalic and atraumatic.   Eyes:      General: No scleral icterus.     Extraocular Movements: Extraocular movements intact.   Cardiovascular:      Rate and Rhythm: Regular rhythm. Tachycardia present.      Pulses: Normal pulses.           Radial pulses are 2+ on the right side and 2+ on the left side.   Pulmonary:      Effort: Pulmonary effort is normal. No accessory muscle usage, prolonged expiration, respiratory distress or retractions.      Breath sounds: Wheezing (wheezing noted across all lung fields.) present. No rhonchi or rales.      Comments: No accessory muscle use. No increased work of breathing. Patient able to speak in full complete sentences without difficulty. Patient oxygenating >95% on 2 L NC. Patient maintaining airway.  Chest:      Chest wall: No tenderness.    Abdominal:      General: Abdomen is flat. There is no distension.   Musculoskeletal:      Cervical back: Normal range of motion.      Right lower leg: No edema.      Left lower leg: No edema.   Skin:     General: Skin is warm and dry.      Coloration: Skin is not pale.   Neurological:      General: No focal deficit present.      Mental Status: She is alert and oriented to person, place, and time.      Motor: No weakness.   Psychiatric:         Mood and Affect: Mood normal.         Behavior: Behavior normal. Behavior is cooperative.         Thought Content: Thought content normal.         Judgment: Judgment normal.       Significant Labs: All pertinent labs within the past 24 hours have been reviewed.  CBC:   Recent Labs   Lab 04/04/24  0915   WBC 8.00   HGB 8.9*   HCT 31.9*        CMP:   Recent Labs   Lab 04/04/24  0915      K 4.0      CO2 21*      BUN 11   CREATININE 0.8   CALCIUM 9.5   PROT 8.9*   ALBUMIN 4.1   BILITOT 0.4   ALKPHOS 67   AST 18   ALT 15   ANIONGAP 11       Significant Imaging: I have reviewed all pertinent imaging results/findings within the past 24 hours.  Imaging Results              CTA Chest Non-Coronary (PE Studies) (Final result)  Result time 04/04/24 11:52:22      Final result by Kelsi Carmona MD (04/04/24 11:52:22)                   Impression:      Suboptimal opacification of the pulmonary arterial system, no definite the central pulmonary embolus.    No definite acute intrathoracic process seen.      Electronically signed by: Kelsi Carmona MD  Date:    04/04/2024  Time:    11:52               Narrative:    EXAMINATION:  CTA CHEST NON CORONARY (PE STUDIES)    CLINICAL HISTORY:  Pulmonary embolism (PE) suspected, positive D-dimer;    TECHNIQUE:  Low dose axial images, sagittal and coronal reformations were obtained from the thoracic inlet to the lung bases following the IV administration of 100 mL of Omnipaque 350.  Contrast timing was optimized  to evaluate the pulmonary arteries.  MIP images were performed.    COMPARISON:  None    FINDINGS:  Vasculature: There is fair opacification of the pulmonary arterial system.  There is significant breathing motion artifacts at the lung basis and lung apices.  No acute central pulmonary embolus identified. Thoracic aorta normal caliber. No evidence of dissection.    Lungs: The tracheobronchial tree is clear. No lung consolidation. No emphysematous lung changes.No pleural effusion.    Mediastinum: No lymphadenopathy.  The cardiac silhouette is normal in size.No pericardial effusion.  The esophagus courses normally.    Upper abdomen (visualized portion):No abnormality seen.    Bones/chest wall: No marrow replacement process.                                    Assessment/Plan:     * Acute asthma exacerbation  Patient presented to ED with wheezing and SOB refractory to home rescue inhaler use and nebulizer treatments. On exam patient was tachycardic, oxygenating >95% on 2 L NC. Wheezing noted across all lung fields.     -levalbuterol q8h 2/2 tachycardia   -cetirizine 5 mg PO daily  -guaifenesin 600 mg PO BID  -incentive spirometer at bedside  - prn oxygen to maintain saturation >95%  -pulse oximetry continuous  -tele d/t tachycardia  -VS q4hr  -consider steroids pending clinical course     ABG  Recent Labs   Lab 04/04/24  1301   PH 7.309*   PO2 27*   PCO2 41.2   HCO3 20.7*   BE -6*       Anemia  Patient's anemia is currently uncontrolled. Has not received any PRBCs to date. Etiology likely BEBO/blood loss due to history of menorrhagia   Current CBC reviewed-   Lab Results   Component Value Date    HGB 8.9 (L) 04/04/2024    HCT 31.9 (L) 04/04/2024   - Monitor serial CBC and transfuse if patient becomes hemodynamically unstable, symptomatic or H/H drops below 7/21.  Lab Results   Component Value Date    FERRITIN 6 (L) 04/04/2024     Lab Results   Component Value Date    TRANSFERRIN 409 (H) 04/04/2024    TIBC 605 (H)  04/04/2024        Class 3 severe obesity with body mass index (BMI) of 40.0 to 44.9 in adult  Body mass index is 40.21 kg/m². Morbid obesity complicates all aspects of disease management from diagnostic modalities to treatment. Weight loss encouraged and health benefits explained to patient.     Metabolic acidosis  Patient with Metabolic acidosis most likely 2/2 Asthma exacerbation.  - monitor on daily bmp       VTE Risk Mitigation (From admission, onward)           Ordered     IP VTE HIGH RISK PATIENT  Once         04/04/24 1239     Place sequential compression device  Until discontinued         04/04/24 1239     Reason for No Pharmacological VTE Prophylaxis  Once        Question:  Reasons:  Answer:  Patient is Ambulatory    04/04/24 1239                    On 04/04/2024, patient should be placed in hospital observation services under my care in collaboration with Charlie Donato MD.      Hilda Fields PA-C  Department of Hospital Medicine  [facility]

## 2024-04-04 NOTE — ED NOTES
Assumed care of pt at this time. VSS, RR even and unlabored. Resting in bed comfortably. No voiced compaints of pain or discomfort at this time. Safety protocols remain, will continue to monitor. Patient identifiers verified and correct for Molly Aly    LOC: The patient is awake, alert and aware of environment with an appropriate affect, the patient is oriented x 4 and speaking appropriately.   APPEARANCE: Patient appears comfortable and in no acute distress, patient is clean and well groomed.  SKIN: The skin is warm and dry, color consistent with ethnicity, patient has normal skin turgor and moist mucus membranes, skin intact, no breakdown or bruising noted.   MUSCULOSKELETAL: Patient moving all extremities spontaneously, no swelling noted.  RESPIRATORY: Airway is open and patent, respirations are spontaneous, patient has a normal effort and rate, no accessory muscle use noted, pt placed on continuous pulse ox with O2 sats noted at 100% on room air. Denies SOB at this time.  CARDIAC: Pt placed on cardiac monitor. Patient has a tachycardic rate and regular rhythm, no edema noted, capillary refill < 3 seconds.   GASTRO: Soft and non tender to palpation, no distention noted, normoactive bowel sounds present in all four quadrants. Pt states bowel movements have been regular.  : Pt denies any pain or frequency with urination.  NEURO: Pt opens eyes spontaneously, behavior appropriate to situation, follows commands, facial expression symmetrical, bilateral hand grasp equal and even, purposeful motor response noted, normal sensation in all extremities when touched with a finger.

## 2024-04-04 NOTE — HPI
Molly Aly is a 33 y.o. female with history of asthma and anemia being admitted to hospital medicine for acute asthma exacerbation. Patient reports sudden onset of shortness of breath and wheezing while at work yesterday. Endorses associated recent sinus congestion and rhinorrhea associated with seasonal allergies. She used her albuterol inhaler, travel nebulizer and home nebulizer treatment without significant improvement in symptoms. Reports her asthma is typically triggered by strong scents but denies recent exposure; feels symptoms are related to allergies. Asthma is managed by her PCP, does not have a pulmonologist.  Does also have a frontal, pressure-like headache. No fever, chills, vision changes, numbness/weakness, abdominal pain, dysuria, hematuria or chest pain.       In ED patient was tachypnic (24), tachycardic (119), and hypertensive (182/115). CXR without evidence of acute pulmonary processes such as pneumonia, pneumothorax, or pleural effusion. CBC with Hemoglobin 8.9, hematocrit 31.9. CMP with CO2 21. Flu and Covid negative.D-dimer was elevated at 0.63. CTA performed without evidence of PE. Patient placed was on 2 L of NC and was oxygenating >95%.  Patient was given Solumedrol, Mg, 1 L of fluids, and duo-nebs with improvement of symptoms. Admitted to hospital medicine for further management.

## 2024-04-04 NOTE — PHARMACY MED REC
"    Admission Medication History     The home medication history was taken by Layne Cole.    You may go to "Admission" then "Reconcile Home Medications" tabs to review and/or act upon these items.     The home medication list has been updated by the Pharmacy department.   Please read ALL comments highlighted in yellow.   Please address this information as you see fit.    Feel free to contact us if you have any questions or require assistance.      The medications listed below were removed from the home medication list. Please reorder if appropriate:  Patient reports no longer taking the following medication(s):  Albuterol (PROVENTIL) 2.5 mg /3 mL (0.083 %) nebulizer solution  Fluticasone-salmeterol 250-50 mcg/dose (WIXELA INHUB) 250-50 mcg/dose diskus inhaler    Medications listed below were obtained from: Patient/family and Analytic software- [employer]  Current Outpatient Medications on File Prior to Encounter   Medication Sig    albuterol (PROVENTIL/VENTOLIN HFA) 90 mcg/actuation inhaler Inhale 1-2 puffs into the lungs every 4 (four) hours as needed for Wheezing or Shortness of Breath. Rescue      albuterol sulfate 2.5 mg/0.5 mL Nebu Take 2.5 mg by nebulization every 4 (four) hours as needed (wheezing). Rescue      fluticasone-salmeterol diskus inhaler 250-50 mcg Inhale 1 puff into the lungs 2 (two) times daily. Controller      VIOS AEROSOL DELIVERY SYSTEM Fanny USE AS DIRECTED WITH SOLUTION     Layne Cole  EXT [id number]             .          "

## 2024-04-05 PROBLEM — R07.89 CHEST TIGHTNESS: Status: ACTIVE | Noted: 2024-04-05

## 2024-04-05 LAB
ANION GAP SERPL CALC-SCNC: 9 MMOL/L (ref 8–16)
ASCENDING AORTA: 2.61 CM
AV INDEX (PROSTH): 1
AV MEAN GRADIENT: 4 MMHG
AV PEAK GRADIENT: 6 MMHG
AV VALVE AREA BY VELOCITY RATIO: 3.5 CM²
AV VALVE AREA: 3.4 CM²
AV VELOCITY RATIO: 1.03
BASOPHILS # BLD AUTO: 0.02 K/UL (ref 0–0.2)
BASOPHILS NFR BLD: 0.2 % (ref 0–1.9)
BSA FOR ECHO PROCEDURE: 2.16 M2
BUN SERPL-MCNC: 9 MG/DL (ref 6–20)
CALCIUM SERPL-MCNC: 9 MG/DL (ref 8.7–10.5)
CHLORIDE SERPL-SCNC: 108 MMOL/L (ref 95–110)
CO2 SERPL-SCNC: 20 MMOL/L (ref 23–29)
CREAT SERPL-MCNC: 0.7 MG/DL (ref 0.5–1.4)
CV ECHO LV RWT: 0.42 CM
DIFFERENTIAL METHOD BLD: ABNORMAL
DOP CALC AO PEAK VEL: 1.26 M/S
DOP CALC AO VTI: 22.12 CM
DOP CALC LVOT AREA: 3.4 CM2
DOP CALC LVOT DIAMETER: 2.08 CM
DOP CALC LVOT PEAK VEL: 1.3 M/S
DOP CALC LVOT STROKE VOLUME: 75.26 CM3
DOP CALCLVOT PEAK VEL VTI: 22.16 CM
E WAVE DECELERATION TIME: 152.19 MSEC
E/A RATIO: 1.8
E/E' RATIO: 6.34 M/S
ECHO LV POSTERIOR WALL: 0.99 CM (ref 0.6–1.1)
EOSINOPHIL # BLD AUTO: 0 K/UL (ref 0–0.5)
EOSINOPHIL NFR BLD: 0.1 % (ref 0–8)
ERYTHROCYTE [DISTWIDTH] IN BLOOD BY AUTOMATED COUNT: 19.8 % (ref 11.5–14.5)
EST. GFR  (NO RACE VARIABLE): >60 ML/MIN/1.73 M^2
FRACTIONAL SHORTENING: 44 % (ref 28–44)
GLUCOSE SERPL-MCNC: 81 MG/DL (ref 70–110)
HCT VFR BLD AUTO: 30.6 % (ref 37–48.5)
HGB BLD-MCNC: 8.8 G/DL (ref 12–16)
IMM GRANULOCYTES # BLD AUTO: 0.08 K/UL (ref 0–0.04)
IMM GRANULOCYTES NFR BLD AUTO: 0.7 % (ref 0–0.5)
INTERVENTRICULAR SEPTUM: 0.7 CM (ref 0.6–1.1)
LA MAJOR: 4.65 CM
LA MINOR: 4.43 CM
LA WIDTH: 3.93 CM
LEFT ATRIUM SIZE: 3.22 CM
LEFT ATRIUM VOLUME INDEX MOD: 22.7 ML/M2
LEFT ATRIUM VOLUME INDEX: 23.7 ML/M2
LEFT ATRIUM VOLUME MOD: 46.82 CM3
LEFT ATRIUM VOLUME: 48.81 CM3
LEFT INTERNAL DIMENSION IN SYSTOLE: 2.64 CM (ref 2.1–4)
LEFT VENTRICLE DIASTOLIC VOLUME INDEX: 49.17 ML/M2
LEFT VENTRICLE DIASTOLIC VOLUME: 101.28 ML
LEFT VENTRICLE MASS INDEX: 63 G/M2
LEFT VENTRICLE SYSTOLIC VOLUME INDEX: 12.4 ML/M2
LEFT VENTRICLE SYSTOLIC VOLUME: 25.64 ML
LEFT VENTRICULAR INTERNAL DIMENSION IN DIASTOLE: 4.68 CM (ref 3.5–6)
LEFT VENTRICULAR MASS: 130.37 G
LV LATERAL E/E' RATIO: 6.13 M/S
LV SEPTAL E/E' RATIO: 6.57 M/S
LYMPHOCYTES # BLD AUTO: 1.8 K/UL (ref 1–4.8)
LYMPHOCYTES NFR BLD: 16.3 % (ref 18–48)
MAGNESIUM SERPL-MCNC: 2.4 MG/DL (ref 1.6–2.6)
MCH RBC QN AUTO: 20.6 PG (ref 27–31)
MCHC RBC AUTO-ENTMCNC: 28.8 G/DL (ref 32–36)
MCV RBC AUTO: 72 FL (ref 82–98)
MONOCYTES # BLD AUTO: 1.3 K/UL (ref 0.3–1)
MONOCYTES NFR BLD: 11.8 % (ref 4–15)
MV PEAK A VEL: 0.51 M/S
MV PEAK E VEL: 0.92 M/S
MV STENOSIS PRESSURE HALF TIME: 44.13 MS
MV VALVE AREA P 1/2 METHOD: 4.99 CM2
NEUTROPHILS # BLD AUTO: 8 K/UL (ref 1.8–7.7)
NEUTROPHILS NFR BLD: 70.9 % (ref 38–73)
NRBC BLD-RTO: 0 /100 WBC
PHOSPHATE SERPL-MCNC: 4.2 MG/DL (ref 2.7–4.5)
PLATELET # BLD AUTO: 213 K/UL (ref 150–450)
PMV BLD AUTO: 11 FL (ref 9.2–12.9)
POTASSIUM SERPL-SCNC: 4 MMOL/L (ref 3.5–5.1)
RA MAJOR: 4.47 CM
RA PRESSURE ESTIMATED: 3 MMHG
RA WIDTH: 3.08 CM
RBC # BLD AUTO: 4.27 M/UL (ref 4–5.4)
RIGHT VENTRICULAR END-DIASTOLIC DIMENSION: 2.64 CM
SINUS: 2.86 CM
SODIUM SERPL-SCNC: 137 MMOL/L (ref 136–145)
STJ: 2.41 CM
TDI LATERAL: 0.15 M/S
TDI SEPTAL: 0.14 M/S
TDI: 0.15 M/S
TRICUSPID ANNULAR PLANE SYSTOLIC EXCURSION: 3.25 CM
WBC # BLD AUTO: 11.21 K/UL (ref 3.9–12.7)
Z-SCORE OF LEFT VENTRICULAR DIMENSION IN END DIASTOLE: -2.85
Z-SCORE OF LEFT VENTRICULAR DIMENSION IN END SYSTOLE: -2.9

## 2024-04-05 PROCEDURE — 25000242 PHARM REV CODE 250 ALT 637 W/ HCPCS

## 2024-04-05 PROCEDURE — 84100 ASSAY OF PHOSPHORUS: CPT

## 2024-04-05 PROCEDURE — 80048 BASIC METABOLIC PNL TOTAL CA: CPT

## 2024-04-05 PROCEDURE — 63600175 PHARM REV CODE 636 W HCPCS: Performed by: INTERNAL MEDICINE

## 2024-04-05 PROCEDURE — 94761 N-INVAS EAR/PLS OXIMETRY MLT: CPT

## 2024-04-05 PROCEDURE — 94640 AIRWAY INHALATION TREATMENT: CPT | Mod: XB

## 2024-04-05 PROCEDURE — 85025 COMPLETE CBC W/AUTO DIFF WBC: CPT

## 2024-04-05 PROCEDURE — 36415 COLL VENOUS BLD VENIPUNCTURE: CPT

## 2024-04-05 PROCEDURE — G0378 HOSPITAL OBSERVATION PER HR: HCPCS

## 2024-04-05 PROCEDURE — 25000003 PHARM REV CODE 250

## 2024-04-05 PROCEDURE — 83735 ASSAY OF MAGNESIUM: CPT

## 2024-04-05 PROCEDURE — 99900035 HC TECH TIME PER 15 MIN (STAT)

## 2024-04-05 RX ORDER — LEVALBUTEROL 1.25 MG/.5ML
1.25 SOLUTION, CONCENTRATE RESPIRATORY (INHALATION) EVERY 6 HOURS PRN
Status: DISCONTINUED | OUTPATIENT
Start: 2024-04-05 | End: 2024-04-07 | Stop reason: HOSPADM

## 2024-04-05 RX ORDER — PREDNISONE 20 MG/1
60 TABLET ORAL DAILY
Status: DISCONTINUED | OUTPATIENT
Start: 2024-04-05 | End: 2024-04-07 | Stop reason: HOSPADM

## 2024-04-05 RX ORDER — FLUTICASONE PROPIONATE 50 MCG
2 SPRAY, SUSPENSION (ML) NASAL DAILY
Status: DISCONTINUED | OUTPATIENT
Start: 2024-04-05 | End: 2024-04-07 | Stop reason: HOSPADM

## 2024-04-05 RX ADMIN — CETIRIZINE HYDROCHLORIDE 5 MG: 5 TABLET, FILM COATED ORAL at 09:04

## 2024-04-05 RX ADMIN — LEVALBUTEROL HYDROCHLORIDE 0.63 MG: 0.63 SOLUTION RESPIRATORY (INHALATION) at 09:04

## 2024-04-05 RX ADMIN — FLUTICASONE PROPIONATE 100 MCG: 50 SPRAY, METERED NASAL at 12:04

## 2024-04-05 RX ADMIN — LEVALBUTEROL 1.25 MG: 1.25 SOLUTION, CONCENTRATE RESPIRATORY (INHALATION) at 03:04

## 2024-04-05 RX ADMIN — GUAIFENESIN 600 MG: 600 TABLET, EXTENDED RELEASE ORAL at 08:04

## 2024-04-05 RX ADMIN — GUAIFENESIN 600 MG: 600 TABLET, EXTENDED RELEASE ORAL at 09:04

## 2024-04-05 RX ADMIN — LEVALBUTEROL 1.25 MG: 1.25 SOLUTION, CONCENTRATE RESPIRATORY (INHALATION) at 01:04

## 2024-04-05 RX ADMIN — PREDNISONE 60 MG: 20 TABLET ORAL at 09:04

## 2024-04-05 RX ADMIN — FERROUS SULFATE TAB EC 325 MG (65 MG FE EQUIVALENT) 1 EACH: 325 (65 FE) TABLET DELAYED RESPONSE at 09:04

## 2024-04-05 NOTE — ASSESSMENT & PLAN NOTE
Patient presented to ED with wheezing and SOB refractory to home rescue inhaler use and nebulizer treatments, admitted to hospital medicine for further management on 4/4. On exam VSS. Patient oxygenating >95% on RA. Wheezing noted across all lung fields.     -continue levalbuterol q8h 2/2 tachycardia   -continue cetirizine 5 mg PO daily  -continue guaifenesin 600 mg PO BID  -start Prednisone 60 mg PO daily   -consider IV steroids pending clinical course  -start ICS  -incentive spirometer at bedside  -prn oxygen to maintain saturation >95%  -pulse oximetry continuous  -tele d/t tachycardia  -VS q4hr    BMP  Recent Labs   Lab 04/05/24  0517   GLU 81      K 4.0      CO2 20*   BUN 9   CREATININE 0.7   CALCIUM 9.0   MG 2.4

## 2024-04-05 NOTE — ASSESSMENT & PLAN NOTE
Body mass index is 41.01 kg/m². Morbid obesity complicates all aspects of disease management from diagnostic modalities to treatment. Weight loss encouraged and health benefits explained to patient.

## 2024-04-05 NOTE — ASSESSMENT & PLAN NOTE
Patient's anemia is currently uncontrolled. Has not received any PRBCs to date. Etiology likely BEBO/blood loss due to history of menorrhagia   Current CBC reviewed-   Lab Results   Component Value Date    HGB 8.8 (L) 04/05/2024    HCT 30.6 (L) 04/05/2024   - Monitor serial CBC and transfuse if patient becomes hemodynamically unstable, symptomatic or H/H drops below 7/21.  Lab Results   Component Value Date    FERRITIN 6 (L) 04/04/2024     Lab Results   Component Value Date    IRON 18 (L) 04/04/2024    TRANSFERRIN 409 (H) 04/04/2024    TIBC 605 (H) 04/04/2024    FESATURATED 3 (L) 04/04/2024      -continue ferrous sulfate daily

## 2024-04-05 NOTE — SUBJECTIVE & OBJECTIVE
Interval History: NAEON. AFVSS. Patient reports chest pain, SOB, and mild headache overnight that improves with duonebs. Cough is now productive after starting mucinex. Reports symptoms have improved when at rest but becomes notably short of breath on exertion. Wheezing noted across all lung fields on exam. Patient maintaining O2 saturation >95% on RA. Echo d/t chest tightness.    Review of Systems   Constitutional:  Negative for appetite change, chills, diaphoresis and fever.   HENT:  Positive for congestion and rhinorrhea. Negative for sneezing and sore throat.    Eyes:  Negative for discharge and visual disturbance.   Respiratory:  Positive for cough (productive), shortness of breath (improved with duonebs) and wheezing. Negative for choking and stridor.    Cardiovascular:  Positive for chest pain (nonradiating, improved with duonebs).   Gastrointestinal:  Negative for abdominal pain, nausea and vomiting.   Genitourinary:  Negative for dysuria, frequency and urgency.   Musculoskeletal:  Negative for arthralgias and myalgias.   Skin:  Negative for pallor.   Neurological:  Positive for headaches (improved with duonebs). Negative for dizziness, syncope and light-headedness.   Psychiatric/Behavioral:  Negative for behavioral problems, confusion and sleep disturbance.      Objective:     Vital Signs (Most Recent):  Temp: 98.7 °F (37.1 °C) (04/05/24 0834)  Pulse: 87 (04/05/24 0834)  Resp: 20 (04/05/24 0834)  BP: 130/77 (04/05/24 0834)  SpO2: 99 % (04/05/24 0834) Vital Signs (24h Range):  Temp:  [98.1 °F (36.7 °C)-99.1 °F (37.3 °C)] 98.7 °F (37.1 °C)  Pulse:  [] 87  Resp:  [18-30] 20  SpO2:  [97 %-100 %] 99 %  BP: (119-163)/() 130/77     Weight: 105 kg (231 lb 7.7 oz)  Body mass index is 41.01 kg/m².    Intake/Output Summary (Last 24 hours) at 4/5/2024 0803  Last data filed at 4/4/2024 231  Gross per 24 hour   Intake 100 ml   Output --   Net 100 ml         Physical Exam  Constitutional:       General: She  is not in acute distress.     Appearance: She is not ill-appearing or diaphoretic.      Comments: Patient resting comfortably in bed.   HENT:      Head: Normocephalic and atraumatic.      Right Ear: External ear normal.      Left Ear: External ear normal.      Nose: Congestion present.      Mouth/Throat:      Mouth: Mucous membranes are moist.   Eyes:      General: No scleral icterus.     Extraocular Movements: Extraocular movements intact.   Cardiovascular:      Rate and Rhythm: Normal rate and regular rhythm.      Pulses: Normal pulses.   Pulmonary:      Effort: Pulmonary effort is normal. No respiratory distress.      Breath sounds: Wheezing (wheezing noted across all lung fields) present.      Comments: Patient maintaining O2 saturation >95% on RA. No increased work of breathing. No accessory muscle use. No retractions. Patient able to speak in full and complete sentences without difficulty.      Abdominal:      General: There is no distension.   Musculoskeletal:         General: Normal range of motion.   Skin:     General: Skin is warm and dry.      Coloration: Skin is not pale.   Neurological:      Mental Status: She is alert and oriented to person, place, and time.   Psychiatric:         Mood and Affect: Mood normal.         Behavior: Behavior normal.         Thought Content: Thought content normal.         Judgment: Judgment normal.       Significant Labs: All pertinent labs within the past 24 hours have been reviewed.  BMP:   Recent Labs   Lab 04/05/24  0517   GLU 81      K 4.0      CO2 20*   BUN 9   CREATININE 0.7   CALCIUM 9.0   MG 2.4     CBC:   Recent Labs   Lab 04/04/24  0915 04/05/24  0517   WBC 8.00 11.21   HGB 8.9* 8.8*   HCT 31.9* 30.6*    213       Significant Imaging: I have reviewed all pertinent imaging results/findings within the past 24 hours.

## 2024-04-05 NOTE — PLAN OF CARE
Problem: Adult Inpatient Plan of Care  Goal: Plan of Care Review  Outcome: Ongoing, Progressing  Goal: Patient-Specific Goal (Individualized)  Outcome: Ongoing, Progressing  Goal: Absence of Hospital-Acquired Illness or Injury  Outcome: Ongoing, Progressing  Goal: Optimal Comfort and Wellbeing  Outcome: Ongoing, Progressing  Goal: Readiness for Transition of Care  Outcome: Ongoing, Progressing     Problem: Bariatric Environmental Safety  Goal: Safety Maintained with Care  Outcome: Ongoing, Progressing     Problem: Asthma Exacerbation  Goal: Asthma Symptom Relief  Outcome: Ongoing, Progressing     Problem: Anemia  Goal: Anemia Symptom Improvement  Outcome: Ongoing, Progressing

## 2024-04-05 NOTE — NURSING
Nurses Note -- 4 Eyes      4/4/2024   7:01 PM      Skin assessed during: Admit      [x] No Altered Skin Integrity Present    []Prevention Measures Documented      [] Yes- Altered Skin Integrity Present or Discovered   [] LDA Added if Not in Epic (Describe Wound)   [] New Altered Skin Integrity was Present on Admit and Documented in LDA   [] Wound Image Taken    Wound Care Consulted? No    Attending Nurse:  Michelle Steward RN/Staff Member:  Romy

## 2024-04-05 NOTE — PLAN OF CARE
[facility]  Initial Discharge Assessment       Primary Care Provider: Best Light MD    Admission Diagnosis: SOB (shortness of breath) [R06.02]  Chest pain [R07.9]  Dyspnea, unspecified type [R06.00]  Exacerbation of asthma, unspecified asthma severity, unspecified whether persistent [J45.901]    Admission Date: 4/4/2024  Expected Discharge Date: 4/6/2024         Payor: BLUE CROSS BLUE SHIELD / Plan: BCBS ALL OUT OF STATE / Product Type: PPO /     Extended Emergency Contact Information  Primary Emergency Contact: Sera Aly  Mobile Phone: [phone]  Relation: Mother   needed? No    Discharge Plan A: (P) Home  Discharge Plan B: (P) Home      [id number] - [place] - [address]  AT [facility] [address]  [address] [place] [address]  Phone: [phone] Fax: [phone]    [id number] - [place] - [address] AT [facility] [address]  [address] [place] [address]  Phone: [phone] Fax: [phone]             SW completed Discharge Planning Assessment with patient via bedside. Discharge planning booklet given to patient/family and whiteboard updated with GILLIAN and phone #. All questions answered.    Patient reported that she will need assistance with transportation upon discharge.     Patient reported that she live alone, and prior to hospitalization she was independent with her ADL's. Patient reported that she is not on dialysis and does not go to a Coumadin clinic.      Patient lives in an apartment complex with stairs to enter. Patient reported that she usually does not have any difficulty climbing the stairs unless she is having issues with her breathing.    Discharge Plan A and Plan B have been determined by review of patient's clinical status, future medical and therapeutic needs, and coverage/benefits for post-acute care in coordination with multidisciplinary team  members.      Nery Mcfarlane LMSW  [facility]  Ext. [phone]

## 2024-04-05 NOTE — ASSESSMENT & PLAN NOTE
Patient with Metabolic acidosis most likely 2/2 Asthma exacerbation.  - monitor on daily bmp     Recent Labs   Lab 04/05/24  0517   GLU 81      K 4.0      CO2 20*   BUN 9   CREATININE 0.7   CALCIUM 9.0   MG 2.4

## 2024-04-05 NOTE — HOSPITAL COURSE
Mrs. Aly is a 33 y.o. female who was admitted to hospital medicine on 4/4 for management of asthma exacerbation. VBG with metabolic acidosis. Iron studies indicative of iron deficiency anemia, started on oral iron supplementation. Already follows with OBGYN for menorrhagia. Patient started on levalbuterol, cetirizine, and guaifenesin, and oral steroids with gradual improvement in symptoms. Gradually weaned oxygen as tolerated to keep SpO2 >95%. Endorsing some chest tightness, TTE obtained to rule out cardiac origin which was unremarkable.   Discharged home with a short course of steroids, iron supplementation and refill of her advair inhaler. Referral to pulmonology placed on discharge to establish care.     On day of discharge patient's vital signs were stable and patient appeared clinically ready for discharge. Hospital course, discharge plan and return precautions discussed - patient expressed understanding. All questions answered at that time.

## 2024-04-05 NOTE — PROGRESS NOTES
[facility] [address] [place] [address] Medicine  Progress Note    Patient Name: Molly Aly  MRN: [id number]  Patient Class: OP- Observation   Admission Date: 4/4/2024  Length of Stay: 0 days  Attending Physician: Charlie Donato MD  Primary Care Provider: Best Light MD        Subjective:     Principal Problem:Acute asthma exacerbation        HPI:  Molly Aly is a 33 y.o. female with history of asthma and anemia being admitted to hospital medicine for acute asthma exacerbation. Patient reports sudden onset of shortness of breath and wheezing while at work yesterday. Endorses associated recent sinus congestion and rhinorrhea associated with seasonal allergies. She used her albuterol inhaler, travel nebulizer and home nebulizer treatment without significant improvement in symptoms. Reports her asthma is typically triggered by strong scents but denies recent exposure; feels symptoms are related to allergies. Asthma is managed by her PCP, does not have a pulmonologist.  Does also have a frontal, pressure-like headache. No fever, chills, vision changes, numbness/weakness, abdominal pain, dysuria, hematuria or chest pain.       In ED patient was tachypnic (24), tachycardic (119), and hypertensive (182/115). CXR without evidence of acute pulmonary processes such as pneumonia, pneumothorax, or pleural effusion. CBC with Hemoglobin 8.9, hematocrit 31.9. CMP with CO2 21. Flu and Covid negative.D-dimer was elevated at 0.63. CTA performed without evidence of PE. Patient placed was on 2 L of NC and was oxygenating >95%.  Patient was given Solumedrol, Mg, 1 L of fluids, and duo-nebs with improvement of symptoms. Admitted to hospital medicine for further management.       Overview/Hospital Course:  Mrs. Aly is a 33 y.o. female who was admitted to hospital medicine on 4/4 for management of asthma exacerbation. VBG with metabolic acidosis. Iron studies indicative of iron deficiency anemia, started on oral iron  supplementation. Already follows with OBGYN for menorrhagia. Patient started on levalbuterol, cetirizine, and guaifenesin, and oral steroids with gradual improvement in symptoms. Gradually weaned oxygen as tolerated to keep SpO2 >95%. Endorsing some chest tightness, TTE obtained to rule out cardiac origin.     Interval History: NAEON. AFVSS. Patient reports chest pain, SOB, and mild headache overnight that improves with duonebs. Cough is now productive after starting mucinex. Reports symptoms have improved when at rest but becomes notably short of breath on exertion. Wheezing noted across all lung fields on exam. Patient maintaining O2 saturation >95% on RA. Echo d/t chest tightness.    Review of Systems   Constitutional:  Negative for appetite change, chills, diaphoresis and fever.   HENT:  Positive for congestion and rhinorrhea. Negative for sneezing and sore throat.    Eyes:  Negative for discharge and visual disturbance.   Respiratory:  Positive for cough (productive), shortness of breath (improved with duonebs) and wheezing. Negative for choking and stridor.    Cardiovascular:  Positive for chest pain (nonradiating, improved with duonebs).   Gastrointestinal:  Negative for abdominal pain, nausea and vomiting.   Genitourinary:  Negative for dysuria, frequency and urgency.   Musculoskeletal:  Negative for arthralgias and myalgias.   Skin:  Negative for pallor.   Neurological:  Positive for headaches (improved with duonebs). Negative for dizziness, syncope and light-headedness.   Psychiatric/Behavioral:  Negative for behavioral problems, confusion and sleep disturbance.      Objective:     Vital Signs (Most Recent):  Temp: 98.7 °F (37.1 °C) (04/05/24 0834)  Pulse: 87 (04/05/24 0834)  Resp: 20 (04/05/24 0834)  BP: 130/77 (04/05/24 0834)  SpO2: 99 % (04/05/24 0834) Vital Signs (24h Range):  Temp:  [98.1 °F (36.7 °C)-99.1 °F (37.3 °C)] 98.7 °F (37.1 °C)  Pulse:  [] 87  Resp:  [18-30] 20  SpO2:  [97 %-100 %] 99  %  BP: (119-163)/() 130/77     Weight: 105 kg (231 lb 7.7 oz)  Body mass index is 41.01 kg/m².    Intake/Output Summary (Last 24 hours) at 4/5/2024 0844  Last data filed at 4/4/2024 2314  Gross per 24 hour   Intake 100 ml   Output --   Net 100 ml         Physical Exam  Constitutional:       General: She is not in acute distress.     Appearance: She is not ill-appearing or diaphoretic.      Comments: Patient resting comfortably in bed.   HENT:      Head: Normocephalic and atraumatic.      Right Ear: External ear normal.      Left Ear: External ear normal.      Nose: Congestion present.      Mouth/Throat:      Mouth: Mucous membranes are moist.   Eyes:      General: No scleral icterus.     Extraocular Movements: Extraocular movements intact.   Cardiovascular:      Rate and Rhythm: Normal rate and regular rhythm.      Pulses: Normal pulses.   Pulmonary:      Effort: Pulmonary effort is normal. No respiratory distress.      Breath sounds: Wheezing (wheezing noted across all lung fields) present.      Comments: Patient maintaining O2 saturation >95% on RA. No increased work of breathing. No accessory muscle use. No retractions. Patient able to speak in full and complete sentences without difficulty.      Abdominal:      General: There is no distension.   Musculoskeletal:         General: Normal range of motion.   Skin:     General: Skin is warm and dry.      Coloration: Skin is not pale.   Neurological:      Mental Status: She is alert and oriented to person, place, and time.   Psychiatric:         Mood and Affect: Mood normal.         Behavior: Behavior normal.         Thought Content: Thought content normal.         Judgment: Judgment normal.       Significant Labs: All pertinent labs within the past 24 hours have been reviewed.  BMP:   Recent Labs   Lab 04/05/24  0517   GLU 81      K 4.0      CO2 20*   BUN 9   CREATININE 0.7   CALCIUM 9.0   MG 2.4     CBC:   Recent Labs   Lab 04/04/24  0915  04/05/24  0517   WBC 8.00 11.21   HGB 8.9* 8.8*   HCT 31.9* 30.6*    213       Significant Imaging: I have reviewed all pertinent imaging results/findings within the past 24 hours.    Assessment/Plan:      * Acute asthma exacerbation  Patient presented to ED with wheezing and SOB refractory to home rescue inhaler use and nebulizer treatments, admitted to hospital medicine for further management on 4/4. On exam VSS. Patient oxygenating >95% on RA. Wheezing noted across all lung fields.     -continue levalbuterol q8h 2/2 tachycardia   -continue cetirizine 5 mg PO daily  -continue guaifenesin 600 mg PO BID  -start Prednisone 60 mg PO daily   -consider IV steroids pending clinical course  -start ICS  -incentive spirometer at bedside  -prn oxygen to maintain saturation >95%  -pulse oximetry continuous  -tele d/t tachycardia  -VS q4hr    BMP  Recent Labs   Lab 04/05/24  0517   GLU 81      K 4.0      CO2 20*   BUN 9   CREATININE 0.7   CALCIUM 9.0   MG 2.4           Anemia  Patient's anemia is currently uncontrolled. Has not received any PRBCs to date. Etiology likely BEBO/blood loss due to history of menorrhagia   Current CBC reviewed-   Lab Results   Component Value Date    HGB 8.8 (L) 04/05/2024    HCT 30.6 (L) 04/05/2024   - Monitor serial CBC and transfuse if patient becomes hemodynamically unstable, symptomatic or H/H drops below 7/21.  Lab Results   Component Value Date    FERRITIN 6 (L) 04/04/2024     Lab Results   Component Value Date    IRON 18 (L) 04/04/2024    TRANSFERRIN 409 (H) 04/04/2024    TIBC 605 (H) 04/04/2024    FESATURATED 3 (L) 04/04/2024      -continue ferrous sulfate daily     Chest tightness  Patient admitted to hospital medicine for further management of asthma exacerbation on 4/4. Patient continuing to reports chest tightness that improves with duonebs.     - low suspicion for cardiac origin but will obtain echo to r/o cardiac etiologies  - monitor on tele     Class 3 severe  obesity with body mass index (BMI) of 40.0 to 44.9 in adult  Body mass index is 41.01 kg/m². Morbid obesity complicates all aspects of disease management from diagnostic modalities to treatment. Weight loss encouraged and health benefits explained to patient.     Metabolic acidosis  Patient with Metabolic acidosis most likely 2/2 Asthma exacerbation.  - monitor on daily bmp     Recent Labs   Lab 04/05/24  0517   GLU 81      K 4.0      CO2 20*   BUN 9   CREATININE 0.7   CALCIUM 9.0   MG 2.4         VTE Risk Mitigation (From admission, onward)           Ordered     IP VTE HIGH RISK PATIENT  Once         04/04/24 1239     Place sequential compression device  Until discontinued         04/04/24 1239     Reason for No Pharmacological VTE Prophylaxis  Once        Question:  Reasons:  Answer:  Patient is Ambulatory    04/04/24 1239                    Discharge Planning   GILLIAN: 4/6/2024     Code Status: Full Code   Is the patient medically ready for discharge?: No    Reason for patient still in hospital (select all that apply): Patient trending condition, Laboratory test, Treatment, and Imaging  Discharge Plan A: Home          Hilda Fields PA-C  Department of Hospital Medicine   [facility]

## 2024-04-05 NOTE — ASSESSMENT & PLAN NOTE
Patient admitted to hospital medicine for further management of asthma exacerbation on 4/4. Patient continuing to reports chest tightness that improves with duonebs.     - low suspicion for cardiac origin but will obtain echo to r/o cardiac etiologies  - monitor on tele

## 2024-04-05 NOTE — CARE UPDATE
"RAPID RESPONSE NURSE CHART REVIEW        Chart Reviewed: 04/05/2024, 12:55 AM    MRN: [id number]  Bed: 1111/1111 A    Dx: Acute asthma exacerbation    Molly Aly has a past medical history of Anemia, Asthma, and Menorrhagia.    Last VS: /71 (BP Location: Right arm, Patient Position: Lying)   Pulse 90 Comment: Simultaneous filing. User may not have seen previous data.  Temp 98.4 °F (36.9 °C) (Oral)   Resp 18   Ht 5' 3" (1.6 m)   Wt 105 kg (231 lb 7.7 oz)   SpO2 98%   Breastfeeding No   BMI 41.01 kg/m²     24H Vital Sign Range:  Temp:  [98.1 °F (36.7 °C)-99.1 °F (37.3 °C)]   Pulse:  []   Resp:  [18-30]   BP: (119-182)/()   SpO2:  [96 %-100 %]     Level of Consciousness (AVPU): alert    Recent Labs     04/04/24  0915   WBC 8.00   HGB 8.9*   HCT 31.9*          Recent Labs     04/04/24  0915      K 4.0      CO2 21*   BUN 11   CREATININE 0.8           Recent Labs     04/04/24  1301   PH 7.309*   PCO2 41.2   PO2 27*   HCO3 20.7*   POCSATURATED 45   BE -6*        OXYGEN:  Flow (L/min): 2          MEWS score: 1    Rounding completed with charge PARTH Olivarez reports pt stable in no distress. No additional concerns verbalized at this time. Instructed to call [phone] for further concerns or assistance.    Yung Tellez RN        "

## 2024-04-06 LAB
ANION GAP SERPL CALC-SCNC: 8 MMOL/L (ref 8–16)
BASOPHILS # BLD AUTO: 0.06 K/UL (ref 0–0.2)
BASOPHILS NFR BLD: 0.6 % (ref 0–1.9)
BUN SERPL-MCNC: 18 MG/DL (ref 6–20)
CALCIUM SERPL-MCNC: 9 MG/DL (ref 8.7–10.5)
CHLORIDE SERPL-SCNC: 107 MMOL/L (ref 95–110)
CO2 SERPL-SCNC: 20 MMOL/L (ref 23–29)
CREAT SERPL-MCNC: 0.8 MG/DL (ref 0.5–1.4)
DIFFERENTIAL METHOD BLD: ABNORMAL
EOSINOPHIL # BLD AUTO: 0 K/UL (ref 0–0.5)
EOSINOPHIL NFR BLD: 0.1 % (ref 0–8)
ERYTHROCYTE [DISTWIDTH] IN BLOOD BY AUTOMATED COUNT: 19.9 % (ref 11.5–14.5)
EST. GFR  (NO RACE VARIABLE): >60 ML/MIN/1.73 M^2
GLUCOSE SERPL-MCNC: 88 MG/DL (ref 70–110)
HCT VFR BLD AUTO: 30.1 % (ref 37–48.5)
HGB BLD-MCNC: 8.6 G/DL (ref 12–16)
IMM GRANULOCYTES # BLD AUTO: 0.04 K/UL (ref 0–0.04)
IMM GRANULOCYTES NFR BLD AUTO: 0.4 % (ref 0–0.5)
LYMPHOCYTES # BLD AUTO: 2.5 K/UL (ref 1–4.8)
LYMPHOCYTES NFR BLD: 23.5 % (ref 18–48)
MAGNESIUM SERPL-MCNC: 2.2 MG/DL (ref 1.6–2.6)
MCH RBC QN AUTO: 20.9 PG (ref 27–31)
MCHC RBC AUTO-ENTMCNC: 28.6 G/DL (ref 32–36)
MCV RBC AUTO: 73 FL (ref 82–98)
MONOCYTES # BLD AUTO: 1 K/UL (ref 0.3–1)
MONOCYTES NFR BLD: 9.5 % (ref 4–15)
NEUTROPHILS # BLD AUTO: 7.1 K/UL (ref 1.8–7.7)
NEUTROPHILS NFR BLD: 65.9 % (ref 38–73)
NRBC BLD-RTO: 0 /100 WBC
PHOSPHATE SERPL-MCNC: 4.4 MG/DL (ref 2.7–4.5)
PLATELET # BLD AUTO: 249 K/UL (ref 150–450)
PMV BLD AUTO: 10.6 FL (ref 9.2–12.9)
POTASSIUM SERPL-SCNC: 4.1 MMOL/L (ref 3.5–5.1)
RBC # BLD AUTO: 4.12 M/UL (ref 4–5.4)
SODIUM SERPL-SCNC: 135 MMOL/L (ref 136–145)
WBC # BLD AUTO: 10.81 K/UL (ref 3.9–12.7)

## 2024-04-06 PROCEDURE — 25000003 PHARM REV CODE 250

## 2024-04-06 PROCEDURE — 63600175 PHARM REV CODE 636 W HCPCS: Performed by: INTERNAL MEDICINE

## 2024-04-06 PROCEDURE — G0378 HOSPITAL OBSERVATION PER HR: HCPCS

## 2024-04-06 PROCEDURE — 99900035 HC TECH TIME PER 15 MIN (STAT)

## 2024-04-06 PROCEDURE — 83735 ASSAY OF MAGNESIUM: CPT

## 2024-04-06 PROCEDURE — 85025 COMPLETE CBC W/AUTO DIFF WBC: CPT

## 2024-04-06 PROCEDURE — 94761 N-INVAS EAR/PLS OXIMETRY MLT: CPT

## 2024-04-06 PROCEDURE — 25000242 PHARM REV CODE 250 ALT 637 W/ HCPCS

## 2024-04-06 PROCEDURE — 94640 AIRWAY INHALATION TREATMENT: CPT | Mod: XB

## 2024-04-06 PROCEDURE — 80048 BASIC METABOLIC PNL TOTAL CA: CPT

## 2024-04-06 PROCEDURE — 84100 ASSAY OF PHOSPHORUS: CPT

## 2024-04-06 PROCEDURE — 36415 COLL VENOUS BLD VENIPUNCTURE: CPT

## 2024-04-06 RX ADMIN — LEVALBUTEROL HYDROCHLORIDE 0.63 MG: 0.63 SOLUTION RESPIRATORY (INHALATION) at 09:04

## 2024-04-06 RX ADMIN — LEVALBUTEROL HYDROCHLORIDE 0.63 MG: 0.63 SOLUTION RESPIRATORY (INHALATION) at 11:04

## 2024-04-06 RX ADMIN — LEVALBUTEROL HYDROCHLORIDE 0.63 MG: 0.63 SOLUTION RESPIRATORY (INHALATION) at 04:04

## 2024-04-06 RX ADMIN — PREDNISONE 60 MG: 20 TABLET ORAL at 08:04

## 2024-04-06 RX ADMIN — FLUTICASONE PROPIONATE 100 MCG: 50 SPRAY, METERED NASAL at 08:04

## 2024-04-06 RX ADMIN — GUAIFENESIN 600 MG: 600 TABLET, EXTENDED RELEASE ORAL at 08:04

## 2024-04-06 RX ADMIN — CETIRIZINE HYDROCHLORIDE 5 MG: 5 TABLET, FILM COATED ORAL at 08:04

## 2024-04-06 RX ADMIN — FERROUS SULFATE TAB EC 325 MG (65 MG FE EQUIVALENT) 1 EACH: 325 (65 FE) TABLET DELAYED RESPONSE at 08:04

## 2024-04-06 NOTE — PROGRESS NOTES
[facility] [address] [place] [address] Medicine  Progress Note    Patient Name: Molly Aly  MRN: [id number]  Patient Class: OP- Observation   Admission Date: 4/4/2024  Length of Stay: 0 days  Attending Physician: Charlie Donato MD  Primary Care Provider: Best Light MD        Subjective:     Principal Problem:Acute asthma exacerbation        HPI:  Molly Aly is a 33 y.o. female with history of asthma and anemia being admitted to hospital medicine for acute asthma exacerbation. Patient reports sudden onset of shortness of breath and wheezing while at work yesterday. Endorses associated recent sinus congestion and rhinorrhea associated with seasonal allergies. She used her albuterol inhaler, travel nebulizer and home nebulizer treatment without significant improvement in symptoms. Reports her asthma is typically triggered by strong scents but denies recent exposure; feels symptoms are related to allergies. Asthma is managed by her PCP, does not have a pulmonologist.  Does also have a frontal, pressure-like headache. No fever, chills, vision changes, numbness/weakness, abdominal pain, dysuria, hematuria or chest pain.       In ED patient was tachypnic (24), tachycardic (119), and hypertensive (182/115). CXR without evidence of acute pulmonary processes such as pneumonia, pneumothorax, or pleural effusion. CBC with Hemoglobin 8.9, hematocrit 31.9. CMP with CO2 21. Flu and Covid negative.D-dimer was elevated at 0.63. CTA performed without evidence of PE. Patient placed was on 2 L of NC and was oxygenating >95%.  Patient was given Solumedrol, Mg, 1 L of fluids, and duo-nebs with improvement of symptoms. Admitted to hospital medicine for further management.       Overview/Hospital Course:  Mrs. Aly is a 33 y.o. female who was admitted to hospital medicine on 4/4 for management of asthma exacerbation. VBG with metabolic acidosis. Iron studies indicative of iron deficiency anemia, started on oral iron  supplementation. Already follows with OBGYN for menorrhagia. Patient started on levalbuterol, cetirizine, and guaifenesin, and oral steroids with gradual improvement in symptoms. Gradually weaned oxygen as tolerated to keep SpO2 >95%. Endorsing some chest tightness, TTE obtained to rule out cardiac origin.     Interval History:    NAEON. AFVSS.  Patient evaluated at beside. Persistently wheezing to all lung fields on auscultation though improved from admission. Will continue scheduled duonebs and steroids for now. 6 minute walk in AM.     Review of Systems   Constitutional:  Negative for appetite change, chills, diaphoresis and fever.   HENT:  Positive for congestion and rhinorrhea. Negative for sneezing and sore throat.    Eyes:  Negative for discharge and visual disturbance.   Respiratory:  Positive for cough (productive), shortness of breath (improved with duonebs) and wheezing. Negative for choking and stridor.    Cardiovascular:  Positive for chest pain (nonradiating, improved with duonebs).   Gastrointestinal:  Negative for abdominal pain, nausea and vomiting.   Genitourinary:  Negative for dysuria, frequency and urgency.   Musculoskeletal:  Negative for arthralgias and myalgias.   Skin:  Negative for pallor.   Neurological:  Positive for headaches (improved with duonebs). Negative for dizziness, syncope and light-headedness.   Psychiatric/Behavioral:  Negative for behavioral problems, confusion and sleep disturbance.      Objective:     Vital Signs (Most Recent):  Temp: 98.4 °F (36.9 °C) (04/06/24 1510)  Pulse: 87 (04/06/24 1659)  Resp: 18 (04/06/24 1659)  BP: 134/85 (04/06/24 1510)  SpO2: 98 % (04/06/24 1659) Vital Signs (24h Range):  Temp:  [98.2 °F (36.8 °C)-98.7 °F (37.1 °C)] 98.4 °F (36.9 °C)  Pulse:  [] 87  Resp:  [12-20] 18  SpO2:  [97 %-100 %] 98 %  BP: (127-143)/(71-99) 134/85     Weight: 105 kg (231 lb 7.7 oz)  Body mass index is 41.01 kg/m².  No intake or output data in the 24 hours ending  04/06/24 1851      Physical Exam  Constitutional:       General: She is not in acute distress.     Appearance: She is not ill-appearing or diaphoretic.      Comments: Patient resting comfortably in bed.   HENT:      Head: Normocephalic and atraumatic.      Right Ear: External ear normal.      Left Ear: External ear normal.      Nose: Congestion present.      Mouth/Throat:      Mouth: Mucous membranes are moist.   Eyes:      General: No scleral icterus.     Extraocular Movements: Extraocular movements intact.   Cardiovascular:      Rate and Rhythm: Normal rate and regular rhythm.      Pulses: Normal pulses.   Pulmonary:      Effort: Pulmonary effort is normal. No respiratory distress.      Breath sounds: Wheezing (wheezing noted across all lung fields) present.      Comments: Patient maintaining O2 saturation >95% on RA. No increased work of breathing. No accessory muscle use. No retractions. Patient able to speak in full and complete sentences without difficulty.      Abdominal:      General: There is no distension.   Musculoskeletal:         General: Normal range of motion.   Skin:     General: Skin is warm and dry.      Coloration: Skin is not pale.   Neurological:      Mental Status: She is alert and oriented to person, place, and time.   Psychiatric:         Mood and Affect: Mood normal.         Behavior: Behavior normal.         Thought Content: Thought content normal.         Judgment: Judgment normal.       Significant Labs: All pertinent labs within the past 24 hours have been reviewed.    Significant Imaging: I have reviewed all pertinent imaging results/findings within the past 24 hours.    Assessment/Plan:      * Acute asthma exacerbation  Patient presented to ED with wheezing and SOB refractory to home rescue inhaler use and nebulizer treatments, admitted to hospital medicine for further management on 4/4. On exam VSS. Patient oxygenating >95% on RA. Wheezing noted across all lung fields.     -continue  levalbuterol q8h 2/2 tachycardia   -continue cetirizine 5 mg PO daily  -continue guaifenesin 600 mg PO BID  -start Prednisone 60 mg PO daily   -consider IV steroids pending clinical course  -start ICS  -incentive spirometer at bedside  -prn oxygen to maintain saturation >95%  -pulse oximetry continuous  -tele d/t tachycardia  -VS q4hr    BMP  Recent Labs   Lab 04/05/24  0517   GLU 81      K 4.0      CO2 20*   BUN 9   CREATININE 0.7   CALCIUM 9.0   MG 2.4           Anemia  Patient's anemia is currently uncontrolled. Has not received any PRBCs to date. Etiology likely BEBO/blood loss due to history of menorrhagia   Current CBC reviewed-   Lab Results   Component Value Date    HGB 8.8 (L) 04/05/2024    HCT 30.6 (L) 04/05/2024   - Monitor serial CBC and transfuse if patient becomes hemodynamically unstable, symptomatic or H/H drops below 7/21.  Lab Results   Component Value Date    FERRITIN 6 (L) 04/04/2024     Lab Results   Component Value Date    IRON 18 (L) 04/04/2024    TRANSFERRIN 409 (H) 04/04/2024    TIBC 605 (H) 04/04/2024    FESATURATED 3 (L) 04/04/2024      -continue ferrous sulfate daily     Chest tightness  Patient admitted to hospital medicine for further management of asthma exacerbation on 4/4. Patient continuing to reports chest tightness that improves with duonebs.     - low suspicion for cardiac origin but will obtain echo to r/o cardiac etiologies  - monitor on tele     Class 3 severe obesity with body mass index (BMI) of 40.0 to 44.9 in adult  Body mass index is 41.01 kg/m². Morbid obesity complicates all aspects of disease management from diagnostic modalities to treatment. Weight loss encouraged and health benefits explained to patient.     Metabolic acidosis  Patient with Metabolic acidosis most likely 2/2 Asthma exacerbation.  - monitor on daily bmp     Recent Labs   Lab 04/05/24  0517   GLU 81      K 4.0      CO2 20*   BUN 9   CREATININE 0.7   CALCIUM 9.0   MG 2.4          VTE Risk Mitigation (From admission, onward)           Ordered     IP VTE HIGH RISK PATIENT  Once         04/04/24 1239     Place sequential compression device  Until discontinued         04/04/24 1239     Reason for No Pharmacological VTE Prophylaxis  Once        Question:  Reasons:  Answer:  Patient is Ambulatory    04/04/24 1239                    Discharge Planning   GILLIAN: 4/7/2024     Code Status: Full Code   Is the patient medically ready for discharge?: No    Reason for patient still in hospital (select all that apply): Patient trending condition and Treatment  Discharge Plan A: Home          Hilda Fields PA-C  Department of Hospital Medicine   [facility]

## 2024-04-06 NOTE — SUBJECTIVE & OBJECTIVE
Interval History:    NAEON. AFVSS.  Patient evaluated at [facility]. Persistently wheezing to all lung fields on auscultation though improved from admission. Will continue scheduled duonebs and steroids for now. 6 minute walk in AM.     Review of Systems   Constitutional:  Negative for appetite change, chills, diaphoresis and fever.   HENT:  Positive for congestion and rhinorrhea. Negative for sneezing and sore throat.    Eyes:  Negative for discharge and visual disturbance.   Respiratory:  Positive for cough (productive), shortness of breath (improved with duonebs) and wheezing. Negative for choking and stridor.    Cardiovascular:  Positive for chest pain (nonradiating, improved with duonebs).   Gastrointestinal:  Negative for abdominal pain, nausea and vomiting.   Genitourinary:  Negative for dysuria, frequency and urgency.   Musculoskeletal:  Negative for arthralgias and myalgias.   Skin:  Negative for pallor.   Neurological:  Positive for headaches (improved with duonebs). Negative for dizziness, syncope and light-headedness.   Psychiatric/Behavioral:  Negative for behavioral problems, confusion and sleep disturbance.      Objective:     Vital Signs (Most Recent):  Temp: 98.4 °F (36.9 °C) (04/06/24 1510)  Pulse: 87 (04/06/24 1659)  Resp: 18 (04/06/24 1659)  BP: 134/85 (04/06/24 1510)  SpO2: 98 % (04/06/24 1659) Vital Signs (24h Range):  Temp:  [98.2 °F (36.8 °C)-98.7 °F (37.1 °C)] 98.4 °F (36.9 °C)  Pulse:  [] 87  Resp:  [12-20] 18  SpO2:  [97 %-100 %] 98 %  BP: (127-143)/(71-99) 134/85     Weight: 105 kg (231 lb 7.7 oz)  Body mass index is 41.01 kg/m².  No intake or output data in the 24 hours ending 04/06/24 1851      Physical Exam  Constitutional:       General: She is not in acute distress.     Appearance: She is not ill-appearing or diaphoretic.      Comments: Patient resting comfortably in bed.   HENT:      Head: Normocephalic and atraumatic.      Right Ear: External ear normal.      Left Ear: External  ear normal.      Nose: Congestion present.      Mouth/Throat:      Mouth: Mucous membranes are moist.   Eyes:      General: No scleral icterus.     Extraocular Movements: Extraocular movements intact.   Cardiovascular:      Rate and Rhythm: Normal rate and regular rhythm.      Pulses: Normal pulses.   Pulmonary:      Effort: Pulmonary effort is normal. No respiratory distress.      Breath sounds: Wheezing (wheezing noted across all lung fields) present.      Comments: Patient maintaining O2 saturation >95% on RA. No increased work of breathing. No accessory muscle use. No retractions. Patient able to speak in full and complete sentences without difficulty.      Abdominal:      General: There is no distension.   Musculoskeletal:         General: Normal range of motion.   Skin:     General: Skin is warm and dry.      Coloration: Skin is not pale.   Neurological:      Mental Status: She is alert and oriented to person, place, and time.   Psychiatric:         Mood and Affect: Mood normal.         Behavior: Behavior normal.         Thought Content: Thought content normal.         Judgment: Judgment normal.       Significant Labs: All pertinent labs within the past 24 hours have been reviewed.    Significant Imaging: I have reviewed all pertinent imaging results/findings within the past 24 hours.

## 2024-04-06 NOTE — PLAN OF CARE
CM followed up on discharge status and reviewed chart. Disposition is home with no needs. GILLIAN moved to 4/7/24, will follow-up then.      Dominga Granado RN  Weekend  - [facility] [place] [address]  Spectralink: [phone]

## 2024-04-06 NOTE — PLAN OF CARE
Problem: Adult Inpatient Plan of Care  Goal: Plan of Care Review  Outcome: Ongoing, Progressing  Goal: Patient-Specific Goal (Individualized)  Outcome: Ongoing, Progressing  Goal: Absence of Hospital-Acquired Illness or Injury  Outcome: Ongoing, Progressing  Goal: Optimal Comfort and Wellbeing  Outcome: Ongoing, Progressing  Goal: Readiness for Transition of Care  Outcome: Ongoing, Progressing     Problem: Bariatric Environmental Safety  Goal: Safety Maintained with Care  Outcome: Ongoing, Progressing     Problem: Asthma Exacerbation  Goal: Asthma Symptom Relief  Outcome: Ongoing, Progressing     Problem: Anemia  Goal: Anemia Symptom Improvement  Outcome: Ongoing, Progressing   Pt progressing toward goals. No distress noted. No falls or injuries during shift. Pt bed in lowest position. Side rails x2. Call bell and personal belongs within reach. Telemetry maintained. Safety precautions maintained.

## 2024-04-07 VITALS
DIASTOLIC BLOOD PRESSURE: 67 MMHG | SYSTOLIC BLOOD PRESSURE: 149 MMHG | HEART RATE: 86 BPM | HEIGHT: 63 IN | TEMPERATURE: 98 F | RESPIRATION RATE: 18 BRPM | WEIGHT: 231.5 LBS | BODY MASS INDEX: 41.02 KG/M2 | OXYGEN SATURATION: 99 %

## 2024-04-07 LAB
ANION GAP SERPL CALC-SCNC: 12 MMOL/L (ref 8–16)
BUN SERPL-MCNC: 15 MG/DL (ref 6–20)
CALCIUM SERPL-MCNC: 9.4 MG/DL (ref 8.7–10.5)
CHLORIDE SERPL-SCNC: 107 MMOL/L (ref 95–110)
CO2 SERPL-SCNC: 17 MMOL/L (ref 23–29)
CREAT SERPL-MCNC: 0.8 MG/DL (ref 0.5–1.4)
ERYTHROCYTE [DISTWIDTH] IN BLOOD BY AUTOMATED COUNT: 19.3 % (ref 11.5–14.5)
EST. GFR  (NO RACE VARIABLE): >60 ML/MIN/1.73 M^2
GLUCOSE SERPL-MCNC: 70 MG/DL (ref 70–110)
HCT VFR BLD AUTO: 31.1 % (ref 37–48.5)
HGB BLD-MCNC: 8.9 G/DL (ref 12–16)
MAGNESIUM SERPL-MCNC: 2.1 MG/DL (ref 1.6–2.6)
MCH RBC QN AUTO: 21 PG (ref 27–31)
MCHC RBC AUTO-ENTMCNC: 28.6 G/DL (ref 32–36)
MCV RBC AUTO: 73 FL (ref 82–98)
PHOSPHATE SERPL-MCNC: 4.8 MG/DL (ref 2.7–4.5)
PLATELET # BLD AUTO: 233 K/UL (ref 150–450)
PMV BLD AUTO: 9.6 FL (ref 9.2–12.9)
POTASSIUM SERPL-SCNC: 4.7 MMOL/L (ref 3.5–5.1)
RBC # BLD AUTO: 4.24 M/UL (ref 4–5.4)
SODIUM SERPL-SCNC: 136 MMOL/L (ref 136–145)
WBC # BLD AUTO: 9.55 K/UL (ref 3.9–12.7)

## 2024-04-07 PROCEDURE — 36415 COLL VENOUS BLD VENIPUNCTURE: CPT

## 2024-04-07 PROCEDURE — 83735 ASSAY OF MAGNESIUM: CPT

## 2024-04-07 PROCEDURE — 25000003 PHARM REV CODE 250

## 2024-04-07 PROCEDURE — 94761 N-INVAS EAR/PLS OXIMETRY MLT: CPT

## 2024-04-07 PROCEDURE — 80048 BASIC METABOLIC PNL TOTAL CA: CPT

## 2024-04-07 PROCEDURE — G0378 HOSPITAL OBSERVATION PER HR: HCPCS

## 2024-04-07 PROCEDURE — 63600175 PHARM REV CODE 636 W HCPCS: Performed by: INTERNAL MEDICINE

## 2024-04-07 PROCEDURE — 84100 ASSAY OF PHOSPHORUS: CPT

## 2024-04-07 PROCEDURE — 85027 COMPLETE CBC AUTOMATED: CPT | Performed by: INTERNAL MEDICINE

## 2024-04-07 PROCEDURE — 36415 COLL VENOUS BLD VENIPUNCTURE: CPT | Performed by: INTERNAL MEDICINE

## 2024-04-07 PROCEDURE — 25000242 PHARM REV CODE 250 ALT 637 W/ HCPCS

## 2024-04-07 PROCEDURE — 94640 AIRWAY INHALATION TREATMENT: CPT

## 2024-04-07 RX ORDER — PREDNISONE 20 MG/1
60 TABLET ORAL DAILY
Qty: 6 TABLET | Refills: 0 | Status: SHIPPED | OUTPATIENT
Start: 2024-04-07 | End: 2024-04-09

## 2024-04-07 RX ORDER — FERROUS SULFATE 325(65) MG
325 TABLET, DELAYED RELEASE (ENTERIC COATED) ORAL DAILY
Qty: 60 TABLET | Refills: 0 | Status: SHIPPED | OUTPATIENT
Start: 2024-04-07 | End: 2024-06-06

## 2024-04-07 RX ORDER — FLUTICASONE PROPIONATE 50 MCG
2 SPRAY, SUSPENSION (ML) NASAL DAILY
Qty: 16 G | Refills: 0 | Status: SHIPPED | OUTPATIENT
Start: 2024-04-07 | End: 2024-04-07 | Stop reason: HOSPADM

## 2024-04-07 RX ORDER — FLUTICASONE PROPIONATE AND SALMETEROL 100; 50 UG/1; UG/1
1 POWDER RESPIRATORY (INHALATION) 2 TIMES DAILY
Qty: 60 EACH | Refills: 2 | Status: SHIPPED | OUTPATIENT
Start: 2024-04-07 | End: 2024-04-15 | Stop reason: ALTCHOICE

## 2024-04-07 RX ADMIN — PREDNISONE 60 MG: 20 TABLET ORAL at 08:04

## 2024-04-07 RX ADMIN — CETIRIZINE HYDROCHLORIDE 5 MG: 5 TABLET, FILM COATED ORAL at 08:04

## 2024-04-07 RX ADMIN — FERROUS SULFATE TAB EC 325 MG (65 MG FE EQUIVALENT) 1 EACH: 325 (65 FE) TABLET DELAYED RESPONSE at 08:04

## 2024-04-07 RX ADMIN — GUAIFENESIN 600 MG: 600 TABLET, EXTENDED RELEASE ORAL at 08:04

## 2024-04-07 RX ADMIN — FLUTICASONE PROPIONATE 100 MCG: 50 SPRAY, METERED NASAL at 09:04

## 2024-04-07 RX ADMIN — LEVALBUTEROL HYDROCHLORIDE 0.63 MG: 0.63 SOLUTION RESPIRATORY (INHALATION) at 08:04

## 2024-04-07 NOTE — NURSING
Pt is AAOx4.  Pt stable no complaints No distress noted. Call light in reach. Bed in low locked position. Side rails x2. Belongings at bedside. Pt free of fall and injuries Questions and concerns voiced and answered.    Plan of care continues.

## 2024-04-07 NOTE — DISCHARGE SUMMARY
[facility] [address] [place] [address] Medicine  Discharge Summary      Patient Name: Molly Aly  MRN: [id number]  YANG: [id number]  Patient Class: OP- Observation  Admission Date: 4/4/2024  Hospital Length of Stay: 0 days  Discharge Date and Time: 4/7/2024 11:42 AM  Attending Physician: Addie att. providers found   Discharging Provider: Hilda Fields PA-C  Primary Care Provider: Best Light MD  [facility] Medicine Team: [facility] MED Y Hilda Fields PA-C  Primary Care Team: [facility] Y    HPI:   Molly Aly is a 33 y.o. female with history of asthma and anemia being admitted to hospital medicine for acute asthma exacerbation. Patient reports sudden onset of shortness of breath and wheezing while at work yesterday. Endorses associated recent sinus congestion and rhinorrhea associated with seasonal allergies. She used her albuterol inhaler, travel nebulizer and home nebulizer treatment without significant improvement in symptoms. Reports her asthma is typically triggered by strong scents but denies recent exposure; feels symptoms are related to allergies. Asthma is managed by her PCP, does not have a pulmonologist.  Does also have a frontal, pressure-like headache. No fever, chills, vision changes, numbness/weakness, abdominal pain, dysuria, hematuria or chest pain.       In ED patient was tachypnic (24), tachycardic (119), and hypertensive (182/115). CXR without evidence of acute pulmonary processes such as pneumonia, pneumothorax, or pleural effusion. CBC with Hemoglobin 8.9, hematocrit 31.9. CMP with CO2 21. Flu and Covid negative.D-dimer was elevated at 0.63. CTA performed without evidence of PE. Patient placed was on 2 L of NC and was oxygenating >95%.  Patient was given Solumedrol, Mg, 1 L of fluids, and duo-nebs with improvement of symptoms. Admitted to hospital medicine for further management.       * No surgery found *      Hospital Course:   Mrs. Aly is a 33 y.o. female who was admitted to  hospital medicine on 4/4 for management of asthma exacerbation. VBG with metabolic acidosis. Iron studies indicative of iron deficiency anemia, started on oral iron supplementation. Already follows with OBGYN for menorrhagia. Patient started on levalbuterol, cetirizine, and guaifenesin, and oral steroids with gradual improvement in symptoms. Gradually weaned oxygen as tolerated to keep SpO2 >95%. Endorsing some chest tightness, TTE obtained to rule out cardiac origin which was unremarkable.   Discharged home with a short course of steroids, iron supplementation and refill of her advair inhaler. Referral to pulmonology placed on discharge to establish care.     On day of discharge patient's vital signs were stable and patient appeared clinically ready for discharge. Hospital course, discharge plan and return precautions discussed - patient expressed understanding. All questions answered at that time.      Goals of Care Treatment Preferences:  Code Status: Full Code      Consults:     No new Assessment & Plan notes have been filed under this hospital service since the last note was generated.  Service: Hospital Medicine    Final Active Diagnoses:    Diagnosis Date Noted POA    PRINCIPAL PROBLEM:  Acute asthma exacerbation [J45.901] 02/23/2022 Yes    Anemia [D64.9] 04/04/2024 Yes    Chest tightness [R07.89] 04/05/2024 Yes    Metabolic acidosis [E87.20] 04/04/2024 Yes    Class 3 severe obesity with body mass index (BMI) of 40.0 to 44.9 in adult [E66.01, Z68.41] 04/04/2024 Not Applicable      Problems Resolved During this Admission:       Discharged Condition: good    Disposition: Home or Self Care    Follow Up:   Follow-up Information       Best Light MD Follow up.    Specialty: Internal Medicine  Contact information:  [address] [place] [address]  [phone]               [facility] Follow up.    Specialty: Pulmonology  Contact information:  [address] [place] [address]  [place] [id number] [phone]  Additional information:  Please park in the [facility] surface lot on the [address] side. Check in on the 3rd floor.                         Patient Instructions:      Ambulatory referral/consult to Pulmonology   Standing Status: Future   Referral Priority: Urgent Referral Type: Consultation   Referral Reason: Specialty Services Required   Requested Specialty: Pulmonary Disease   Number of Visits Requested: 1     Diet Adult Regular     Notify your health care provider if you experience any of the following:  temperature >100.4     Notify your health care provider if you experience any of the following:  persistent nausea and vomiting or diarrhea     Notify your health care provider if you experience any of the following:  severe uncontrolled pain     Notify your health care provider if you experience any of the following:  difficulty breathing or increased cough     Notify your health care provider if you experience any of the following:  severe persistent headache     Notify your health care provider if you experience any of the following:  worsening rash     Notify your health care provider if you experience any of the following:  persistent dizziness, light-headedness, or visual disturbances     Notify your health care provider if you experience any of the following:  increased confusion or weakness     Activity as tolerated       Significant Diagnostic Studies:   Lab Results   Component Value Date    WBC 9.55 04/07/2024    HGB 8.9 (L) 04/07/2024    HCT 31.1 (L) 04/07/2024    MCV 73 (L) 04/07/2024     04/07/2024       BMP  Lab Results   Component Value Date     04/07/2024    K 4.7 04/07/2024     04/07/2024    CO2 17 (L) 04/07/2024    BUN 15 04/07/2024    CREATININE 0.8 04/07/2024    CALCIUM 9.4 04/07/2024    ANIONGAP 12 04/07/2024    EGFRNORACEVR >60.0 04/07/2024     Imaging Results              CTA Chest Non-Coronary (PE Studies) (Final result)   Result time 04/04/24 11:52:22      Final result by Kelsi Carmona MD (04/04/24 11:52:22)                   Impression:      Suboptimal opacification of the pulmonary arterial system, no definite the central pulmonary embolus.    No definite acute intrathoracic process seen.      Electronically signed by: Kelsi Carmona MD  Date:    04/04/2024  Time:    11:52               Narrative:    EXAMINATION:  CTA CHEST NON CORONARY (PE STUDIES)    CLINICAL HISTORY:  Pulmonary embolism (PE) suspected, positive D-dimer;    TECHNIQUE:  Low dose axial images, sagittal and coronal reformations were obtained from the thoracic inlet to the lung bases following the IV administration of 100 mL of Omnipaque 350.  Contrast timing was optimized to evaluate the pulmonary arteries.  MIP images were performed.    COMPARISON:  None    FINDINGS:  Vasculature: There is fair opacification of the pulmonary arterial system.  There is significant breathing motion artifacts at the lung basis and lung apices.  No acute central pulmonary embolus identified. Thoracic aorta normal caliber. No evidence of dissection.    Lungs: The tracheobronchial tree is clear. No lung consolidation. No emphysematous lung changes.No pleural effusion.    Mediastinum: No lymphadenopathy.  The cardiac silhouette is normal in size.No pericardial effusion.  The esophagus courses normally.    Upper abdomen (visualized portion):No abnormality seen.    Bones/chest wall: No marrow replacement process.                                      Pending Diagnostic Studies:       None           Medications:  Reconciled Home Medications:      Medication List        START taking these medications      ferrous sulfate 325 (65 FE) MG EC tablet  Take 1 tablet (325 mg total) by mouth once daily.     predniSONE 20 MG tablet  Commonly known as: DELTASONE  Take 3 tablets (60 mg total) by mouth once daily. for 2 days            CHANGE how you take these medications      albuterol 90  mcg/actuation inhaler  Commonly known as: PROVENTIL/VENTOLIN HFA  Inhale 1-2 puffs into the lungs every 4 (four) hours as needed for Wheezing or Shortness of Breath. Rescue  What changed: Another medication with the same name was removed. Continue taking this medication, and follow the directions you see here.     * fluticasone-salmeterol 250-50 mcg/dose 250-50 mcg/dose diskus inhaler  Commonly known as: ADVAIR  Inhale 1 puff into the lungs 2 (two) times daily. Controller  What changed: Another medication with the same name was added. Make sure you understand how and when to take each.     * ADVAIR DISKUS 100-50 mcg/dose diskus inhaler  Generic drug: fluticasone-salmeterol 100-50 mcg/dose  Inhale 1 puff into the lungs 2 (two) times a day. Controller  What changed: You were already taking a medication with the same name, and this prescription was added. Make sure you understand how and when to take each.           * This list has 2 medication(s) that are the same as other medications prescribed for you. Read the directions carefully, and ask your doctor or other care provider to review them with you.                CONTINUE taking these medications      VIOS AEROSOL DELIVERY SYSTEM Fanny  Generic drug: nebulizer and compressor  USE AS DIRECTED WITH SOLUTION              Indwelling Lines/Drains at time of discharge:   Lines/Drains/Airways       None                   Time spent on the discharge of patient: 36 minutes         Hilda Fields PA-C  Department of Hospital Medicine  [facility]

## 2024-04-07 NOTE — PLAN OF CARE
Update at 11:30 AM  Arranged Lyft ride home for patient, details below.    Drop-off  [address] [facility], [place], [place],    and number  [place], [phone]  Make and Model  [id number]  License plate  [id number]  Request details  Dominga Granado  Date and time  4/7/24, 11:26 AM CDT  Ride ID  [id number]      10:05 AM - CM noted discharge order and reviewed chart. Disposition is home. Needs Lyft ride home after receives bedside delivery.     Dominga Granado RN  Weekend  - [facility] [place] [facility]  Spectralink: [phone]

## 2024-04-07 NOTE — PLAN OF CARE
Problem: Adult Inpatient Plan of Care  Goal: Plan of Care Review  Outcome: Met  Goal: Patient-Specific Goal (Individualized)  Outcome: Met  Goal: Absence of Hospital-Acquired Illness or Injury  Outcome: Met  Goal: Optimal Comfort and Wellbeing  Outcome: Met  Goal: Readiness for Transition of Care  Outcome: Met     Problem: Bariatric Environmental Safety  Goal: Safety Maintained with Care  Outcome: Met     Problem: Asthma Exacerbation  Goal: Asthma Symptom Relief  Outcome: Met     Problem: Anemia  Goal: Anemia Symptom Improvement  Outcome: Met

## 2024-04-07 NOTE — NURSING
Home Oxygen Evaluation    Date Performed: 2024    1) Patient's Home O2 Sat on room air, while at rest: 99%        If O2 sats on room air at rest are 88% or below, patient qualifies. No additional testing needed. Document N/A in steps 2 and 3. If 89% or above, complete steps 2.      2) Patient's O2 Sat on room air while exercisin%        If O2 sats on room air while exercising remain 89% or above patient does not qualify, no further testing needed Document N/A in step 3. If O2 sats on room air while exercising are 88% or below, continue to step 3.      3) Patient's O2 Sat while exercising on O2: 0 at 0 LPM         (Must show improvement from #2 for patients to qualify)    If O2 sats improve on oxygen, patient qualifies for portable oxygen. If not, the patient does not qualify.

## 2024-04-07 NOTE — NURSING
6 minute walk test done, PA aware. Pt stable no complaint of any at this time. Pt awaiting for medication to be brought up from Pharmacy, then discharge proceed

## 2024-04-07 NOTE — PLAN OF CARE
[facility]  Discharge Final Note    Primary Care Provider: Best Light MD    Expected Discharge Date: 4/7/2024    Patient cleared for discharge from case management standpoint.    Final Discharge Note (most recent)       Final Note - 04/07/24 1137          Final Note    Assessment Type Final Discharge Note     Anticipated Discharge Disposition Home or Self Care     What phone number can be called within the next 1-3 days to see how you are doing after discharge? [phone]     Hospital Resources/Appts/Education Provided Provided patient/caregiver with written discharge plan information        Post-Acute Status    Discharge Delays None known at this time                   Contact Info       Best Light MD   Specialty: Internal Medicine   Relationship: PCP - General    [address] [place] [id number]   Phone: [phone]       Next Steps: Follow up    [facility]   Specialty: Pulmonology    [facility] [address]   Phone: [phone]       Next Steps: Follow up          Dominga Granado RN  Weekend  - [facility]  Spectralink: [phone]

## 2024-04-07 NOTE — NURSING
Discharge to home to self care. Instructions reviewed. Aware of med regimen and follow up appointments. Denies questions/concerns. IV removed. Belongings gathered. Awaiting pharmacy for home meds.

## 2024-04-08 ENCOUNTER — TELEPHONE (OUTPATIENT)
Dept: PULMONOLOGY | Facility: CLINIC | Age: 34
End: 2024-04-08
Payer: COMMERCIAL

## 2024-04-08 DIAGNOSIS — J45.909 ASTHMA, UNSPECIFIED ASTHMA SEVERITY, UNSPECIFIED WHETHER COMPLICATED, UNSPECIFIED WHETHER PERSISTENT: Primary | ICD-10-CM

## 2024-04-15 ENCOUNTER — HOSPITAL ENCOUNTER (OUTPATIENT)
Dept: PULMONOLOGY | Facility: CLINIC | Age: 34
Discharge: HOME | End: 2024-04-15
Payer: COMMERCIAL

## 2024-04-15 ENCOUNTER — OFFICE VISIT (OUTPATIENT)
Dept: PULMONOLOGY | Facility: CLINIC | Age: 34
End: 2024-04-15
Payer: COMMERCIAL

## 2024-04-15 ENCOUNTER — LAB VISIT (OUTPATIENT)
Dept: LAB | Facility: HOSPITAL | Age: 34
End: 2024-04-15
Attending: INTERNAL MEDICINE
Payer: COMMERCIAL

## 2024-04-15 VITALS
DIASTOLIC BLOOD PRESSURE: 82 MMHG | HEART RATE: 89 BPM | OXYGEN SATURATION: 99 % | WEIGHT: 239 LBS | SYSTOLIC BLOOD PRESSURE: 142 MMHG | BODY MASS INDEX: 42.35 KG/M2 | HEIGHT: 63 IN

## 2024-04-15 DIAGNOSIS — J45.909 ASTHMA, UNSPECIFIED ASTHMA SEVERITY, UNSPECIFIED WHETHER COMPLICATED, UNSPECIFIED WHETHER PERSISTENT: ICD-10-CM

## 2024-04-15 DIAGNOSIS — D64.9 ANEMIA, UNSPECIFIED TYPE: ICD-10-CM

## 2024-04-15 DIAGNOSIS — E66.01 CLASS 3 SEVERE OBESITY DUE TO EXCESS CALORIES WITHOUT SERIOUS COMORBIDITY WITH BODY MASS INDEX (BMI) OF 40.0 TO 44.9 IN ADULT: ICD-10-CM

## 2024-04-15 DIAGNOSIS — J45.901 EXACERBATION OF ASTHMA, UNSPECIFIED ASTHMA SEVERITY, UNSPECIFIED WHETHER PERSISTENT: ICD-10-CM

## 2024-04-15 DIAGNOSIS — J45.40 MODERATE PERSISTENT ASTHMA WITHOUT COMPLICATION: Primary | ICD-10-CM

## 2024-04-15 DIAGNOSIS — J45.901 MODERATE ASTHMA WITH ACUTE EXACERBATION, UNSPECIFIED WHETHER PERSISTENT: ICD-10-CM

## 2024-04-15 LAB
BASOPHILS # BLD AUTO: 0.05 K/UL (ref 0–0.2)
BASOPHILS NFR BLD: 0.7 % (ref 0–1.9)
DIFFERENTIAL METHOD BLD: ABNORMAL
EOSINOPHIL # BLD AUTO: 0.4 K/UL (ref 0–0.5)
EOSINOPHIL NFR BLD: 5.2 % (ref 0–8)
ERYTHROCYTE [DISTWIDTH] IN BLOOD BY AUTOMATED COUNT: 20 % (ref 11.5–14.5)
HCT VFR BLD AUTO: 30.6 % (ref 37–48.5)
HGB BLD-MCNC: 8.9 G/DL (ref 12–16)
IGE SERPL-ACNC: 358 IU/ML (ref 0–100)
IMM GRANULOCYTES # BLD AUTO: 0.03 K/UL (ref 0–0.04)
IMM GRANULOCYTES NFR BLD AUTO: 0.4 % (ref 0–0.5)
LYMPHOCYTES # BLD AUTO: 2.3 K/UL (ref 1–4.8)
LYMPHOCYTES NFR BLD: 31.9 % (ref 18–48)
MCH RBC QN AUTO: 21.5 PG (ref 27–31)
MCHC RBC AUTO-ENTMCNC: 29.1 G/DL (ref 32–36)
MCV RBC AUTO: 74 FL (ref 82–98)
MONOCYTES # BLD AUTO: 0.7 K/UL (ref 0.3–1)
MONOCYTES NFR BLD: 10 % (ref 4–15)
NEUTROPHILS # BLD AUTO: 3.7 K/UL (ref 1.8–7.7)
NEUTROPHILS NFR BLD: 51.8 % (ref 38–73)
NRBC BLD-RTO: 0 /100 WBC
PLATELET # BLD AUTO: 267 K/UL (ref 150–450)
PMV BLD AUTO: 11.1 FL (ref 9.2–12.9)
RBC # BLD AUTO: 4.13 M/UL (ref 4–5.4)
WBC # BLD AUTO: 7.09 K/UL (ref 3.9–12.7)

## 2024-04-15 PROCEDURE — 36415 COLL VENOUS BLD VENIPUNCTURE: CPT | Performed by: INTERNAL MEDICINE

## 2024-04-15 PROCEDURE — 3077F SYST BP >= 140 MM HG: CPT | Mod: CPTII,S$GLB,, | Performed by: INTERNAL MEDICINE

## 2024-04-15 PROCEDURE — 99203 OFFICE O/P NEW LOW 30 MIN: CPT | Mod: 25,S$GLB,, | Performed by: INTERNAL MEDICINE

## 2024-04-15 PROCEDURE — 85025 COMPLETE CBC W/AUTO DIFF WBC: CPT | Performed by: INTERNAL MEDICINE

## 2024-04-15 PROCEDURE — 3008F BODY MASS INDEX DOCD: CPT | Mod: CPTII,S$GLB,, | Performed by: INTERNAL MEDICINE

## 2024-04-15 PROCEDURE — 1159F MED LIST DOCD IN RCRD: CPT | Mod: CPTII,S$GLB,, | Performed by: INTERNAL MEDICINE

## 2024-04-15 PROCEDURE — 94727 GAS DIL/WSHOT DETER LNG VOL: CPT | Mod: S$GLB,,, | Performed by: INTERNAL MEDICINE

## 2024-04-15 PROCEDURE — 94729 DIFFUSING CAPACITY: CPT | Mod: S$GLB,,, | Performed by: INTERNAL MEDICINE

## 2024-04-15 PROCEDURE — 94060 EVALUATION OF WHEEZING: CPT | Mod: S$GLB,,, | Performed by: INTERNAL MEDICINE

## 2024-04-15 PROCEDURE — 3079F DIAST BP 80-89 MM HG: CPT | Mod: CPTII,S$GLB,, | Performed by: INTERNAL MEDICINE

## 2024-04-15 PROCEDURE — 82785 ASSAY OF IGE: CPT | Performed by: INTERNAL MEDICINE

## 2024-04-15 PROCEDURE — 99999 PR PBB SHADOW E&M-EST. PATIENT-LVL IV: CPT | Mod: PBBFAC,,, | Performed by: INTERNAL MEDICINE

## 2024-04-15 RX ORDER — FLUTICASONE PROPIONATE AND SALMETEROL 500; 50 UG/1; UG/1
1 POWDER RESPIRATORY (INHALATION) 2 TIMES DAILY
Qty: 60 EACH | Refills: 11 | Status: SHIPPED | OUTPATIENT
Start: 2024-04-15 | End: 2025-04-15

## 2024-04-15 NOTE — ASSESSMENT & PLAN NOTE
Per the patient is is likely secondary to her menstrual cycles and has already seen her OBGYN to discuss that and is continuing her iron supplementation
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
## 2024-04-15 NOTE — ASSESSMENT & PLAN NOTE
Increase fluticasone/ salmeterol dose to 500/50 b.i.d.  Repeat CBC and IgE and refer to allergy   Her iron deficiency could also be contributing to her worsening asthma control

## 2024-04-18 ENCOUNTER — OFFICE VISIT (OUTPATIENT)
Dept: INTERNAL MEDICINE | Facility: CLINIC | Age: 34
End: 2024-04-18
Payer: COMMERCIAL

## 2024-04-18 VITALS
DIASTOLIC BLOOD PRESSURE: 82 MMHG | BODY MASS INDEX: 43.01 KG/M2 | HEART RATE: 70 BPM | SYSTOLIC BLOOD PRESSURE: 130 MMHG | HEIGHT: 63 IN | RESPIRATION RATE: 10 BRPM | WEIGHT: 242.75 LBS

## 2024-04-18 DIAGNOSIS — D50.9 MICROCYTIC ANEMIA: ICD-10-CM

## 2024-04-18 DIAGNOSIS — E66.01 CLASS 3 SEVERE OBESITY DUE TO EXCESS CALORIES WITHOUT SERIOUS COMORBIDITY WITH BODY MASS INDEX (BMI) OF 40.0 TO 44.9 IN ADULT: ICD-10-CM

## 2024-04-18 DIAGNOSIS — J45.40 MODERATE PERSISTENT ASTHMA WITHOUT COMPLICATION: Primary | ICD-10-CM

## 2024-04-18 PROBLEM — D64.9 ANEMIA: Status: RESOLVED | Noted: 2024-04-04 | Resolved: 2024-04-18

## 2024-04-18 PROBLEM — E87.20 METABOLIC ACIDOSIS: Status: RESOLVED | Noted: 2024-04-04 | Resolved: 2024-04-18

## 2024-04-18 PROBLEM — R07.89 CHEST TIGHTNESS: Status: RESOLVED | Noted: 2024-04-05 | Resolved: 2024-04-18

## 2024-04-18 PROCEDURE — 1160F RVW MEDS BY RX/DR IN RCRD: CPT | Mod: CPTII,S$GLB,,

## 2024-04-18 PROCEDURE — 3008F BODY MASS INDEX DOCD: CPT | Mod: CPTII,S$GLB,,

## 2024-04-18 PROCEDURE — 99213 OFFICE O/P EST LOW 20 MIN: CPT | Mod: S$GLB,,,

## 2024-04-18 PROCEDURE — 99999 PR PBB SHADOW E&M-EST. PATIENT-LVL III: CPT | Mod: PBBFAC,,,

## 2024-04-18 PROCEDURE — 3079F DIAST BP 80-89 MM HG: CPT | Mod: CPTII,S$GLB,,

## 2024-04-18 PROCEDURE — 1159F MED LIST DOCD IN RCRD: CPT | Mod: CPTII,S$GLB,,

## 2024-04-18 PROCEDURE — 3075F SYST BP GE 130 - 139MM HG: CPT | Mod: CPTII,S$GLB,,

## 2024-04-18 NOTE — PROGRESS NOTES
I have reviewed the notes, assessments, and/or procedures performed by Dr. Ingram, I concur with her/his documentation of Molly Aly.    Hospital discharge follow up  Acute exacerbation of moderate persistent asthma - increased advair 500-50; albuterol nebs / rescue. Completed steroid burst.  Lungs clear on examination  Has follow up with pulm and allergy.     RTC prn

## 2024-04-18 NOTE — PROGRESS NOTES
Internal Medicine Clinic Note    Subjective     Chief Complaint: Asthma Hospital F/u    History of Present Illness:  Ms. Molly Aly is a 33 y.o. female with BEBO and moderate persistent asthma here for hospital discharge follow up after admission for asthma exacerbations. She was diagnosed as a child with asthma but did not have issues for most of her young adult life and just recently started having issues with her asthma again. She was seen in the ED about 2 weeks ago - she suddenly developed shortness of breath abd was treated with steroids and breathing treatments. She has been on fluticasone and salmeterol and at the most recent hospitalization they increased her dose to 250/50, recently upgraded to 500/50 by pulmonologist, Dr. Stevens. She is instructed to start after she finishes her most recent Advair inhaler. Flonase daily since leaving hospital. Doesn't notice nasal congestion when not using it. Does have nasal dryness and recently with intense sense of smell where many fragrances bother here. Prophylactically using nebulizer tx once a day to be safe. No symptoms on days that she doesn't miss it. No shortness of breath, chest pain, fever, chills, cough, sore throat, nasal congestion.      Has new pt appointment with allergist in 2 weeks.     Review of Systems   Constitutional:  Negative for chills and fever.   HENT:  Negative for congestion and sinus pain.    Eyes:  Negative for double vision and photophobia.   Respiratory:  Negative for cough, shortness of breath and wheezing.    Cardiovascular:  Negative for chest pain and orthopnea.   Gastrointestinal:  Negative for abdominal pain, nausea and vomiting.   Musculoskeletal:  Negative for falls and myalgias.   Skin:  Negative for itching and rash.   Neurological:  Negative for dizziness and weakness.   Psychiatric/Behavioral:  Negative for depression. The patient is not nervous/anxious.        PAST HISTORY:     Past Medical History:   Diagnosis Date     Anemia     Asthma     Chest tightness 2024    Menorrhagia     Metabolic acidosis 2024       No past surgical history on file.    Family History   Problem Relation Name Age of Onset    Breast cancer Neg Hx      Colon cancer Neg Hx      Ovarian cancer Neg Hx         Social History     Socioeconomic History    Marital status: Single   Tobacco Use    Smoking status: Former     Types: Cigars     Start date: 2019     Quit date: 2022     Years since quittin.2    Smokeless tobacco: Never   Substance and Sexual Activity    Alcohol use: Yes     Comment: special occasions    Drug use: Not Currently     Comment: marijuana in past    Sexual activity: Yes     Partners: Male     Birth control/protection: None   Social History Narrative    Lives alone, 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     Social Determinants of Health     Financial Resource Strain: Low Risk  (2024)    Overall Financial Resource Strain (CARDIA)     Difficulty of Paying Living Expenses: Not very hard   Food Insecurity: No Food Insecurity (2024)    Hunger Vital Sign     Worried About Running Out of Food in the Last Year: Never true     Ran Out of Food in the Last Year: Never true   Transportation Needs: No Transportation Needs (2024)    PRAPARE - Transportation     Lack of Transportation (Medical): No     Lack of Transportation (Non-Medical): No   Physical Activity: Inactive (2022)    Exercise Vital Sign     Days of Exercise per Week: 0 days     Minutes of Exercise per Session: 0 min   Stress: No Stress Concern Present (2024)    [place] [employer] - Occupational Stress Questionnaire     Feeling of Stress : Only a little   Social Connections: Unknown (2022)    Social Connection and Isolation Panel [NHANES]     Frequency of Communication with Friends and Family: More than three times a week     Frequency of Social Gatherings with Friends and Family: More than three times a week     Attends Temple  "Services: Never     Active Member of Clubs or Organizations: No     Attends Club or Organization Meetings: Never   Housing Stability: Low Risk  (4/5/2024)    Housing Stability Vital Sign     Unable to Pay for Housing in the Last Year: No     Number of Places Lived in the Last Year: 1     Unstable Housing in the Last Year: No       MEDICATIONS & ALLERGIES:     Current Outpatient Medications   Medication Sig Dispense Refill    albuterol (PROVENTIL/VENTOLIN HFA) 90 mcg/actuation inhaler Inhale 1-2 puffs into the lungs every 4 (four) hours as needed for Wheezing or Shortness of Breath. Rescue 18 g 11    ferrous sulfate 325 (65 FE) MG EC tablet Take 1 tablet (325 mg total) by mouth once daily. 60 tablet 0    fluticasone-salmeterol 500-50 mcg/dose (WIXELA INHUB) 500-50 mcg/dose DsDv diskus inhaler Inhale 1 puff into the lungs 2 (two) times daily. Controller 60 each 11    fluticasone-salmeterol diskus inhaler 250-50 mcg Inhale 1 puff into the lungs 2 (two) times daily. Controller 180 each 11    VIOS AEROSOL DELIVERY SYSTEM Fanny USE AS DIRECTED WITH SOLUTION       No current facility-administered medications for this visit.       Review of patient's allergies indicates:  No Known Allergies    OBJECTIVE:     Vital Signs:  Vitals:    04/18/24 1326   BP: 130/82   BP Location: Left arm   Pulse: 70   Resp: 10   Weight: 110.1 kg (242 lb 11.6 oz)   Height: 5' 3" (1.6 m)       Body mass index is 43 kg/m².     Physical Exam  Constitutional:       General: She is not in acute distress.     Appearance: She is obese. She is not ill-appearing, toxic-appearing or diaphoretic.   HENT:      Head: Normocephalic and atraumatic.      Nose: Nose normal. No rhinorrhea.      Mouth/Throat:      Mouth: Mucous membranes are moist.   Eyes:      General:         Right eye: No discharge.         Left eye: No discharge.      Conjunctiva/sclera: Conjunctivae normal.   Cardiovascular:      Rate and Rhythm: Normal rate and regular rhythm.      Heart " "sounds: No murmur heard.     No friction rub. No gallop.   Pulmonary:      Effort: Pulmonary effort is normal. No respiratory distress.      Breath sounds: Normal breath sounds. No stridor. No wheezing, rhonchi or rales.   Abdominal:      General: Abdomen is flat. There is no distension.   Musculoskeletal:         General: Normal range of motion.      Right lower leg: No edema.      Left lower leg: No edema.   Skin:     General: Skin is warm and dry.      Findings: No rash.   Neurological:      General: No focal deficit present.      Mental Status: She is alert and oriented to person, place, and time.      Gait: Gait normal.   Psychiatric:         Mood and Affect: Mood normal.         Behavior: Behavior normal.       Laboratory  Lab Results   Component Value Date    WBC 7.09 04/15/2024    HGB 8.9 (L) 04/15/2024    HCT 30.6 (L) 04/15/2024    MCV 74 (L) 04/15/2024     04/15/2024     BMP  Lab Results   Component Value Date     04/07/2024    K 4.7 04/07/2024     04/07/2024    CO2 17 (L) 04/07/2024    BUN 15 04/07/2024    CREATININE 0.8 04/07/2024    CALCIUM 9.4 04/07/2024    ANIONGAP 12 04/07/2024    EGFRNORACEVR >60.0 04/07/2024     No results found for: "INR", "PROTIME"  Lab Results   Component Value Date    HGBA1C 5.4 08/24/2023       Diagnostic Results:  Labs: Reviewed  X-Ray: Reviewed    Health Maintenance Due   Topic Date Due    COVID-19 Vaccine (1 - 2023-24 season) Never done         ASSESSMENT & PLAN:   Ms. Molly Aly is a 33 y.o. female w/ moderate persistent asthma (no hx of intubation) and BEBO 2/2 to blood loss presenting for asthma hospitalization discharge follow up.           1. Moderate persistent asthma without complication  Assessment & Plan:  -f/u with pulm and allergy as instructed  -pt with adequate refills, continue ASHLEY and neb PRN, maintenance Wixela  -add on nasal saline rinses for nasal dryness      2. Class 3 severe obesity due to excess calories without serious " comorbidity with body mass index (BMI) of 40.0 to 44.9 in adult    3. Microcytic anemia  Overview:  History of heavy menstrual cycles per patient.   No lightheadedness, SOB, fatigue.  Iron panel with low iron (18), low ferritin, elevated TIBC.  On iron supplement daily.  Assessed by OBGYN, they are discussing oral contraception options.     Assessment & Plan:  -due for f/u of menstrual bleeding, pt to make appt to discuss  -CBC stable  -counseled to monitor for symptoms of anemia  -continue iron supplementation, add Vit C for better absorption           Follow up if symptoms worsen or fail to improve.      Discussed with Dr. Lockwood  - staff attestation to follow        Consuelo Ingram MD  Internal Medicine, PGY-III  [facility]  [address] [place] [address] [phone]

## 2024-04-18 NOTE — ASSESSMENT & PLAN NOTE
-f/u with pulm and allergy as instructed  -pt with adequate refills, continue ASHLEY and neb PRN, maintenance Wixela  -add on nasal saline rinses for nasal dryness

## 2024-04-18 NOTE — ASSESSMENT & PLAN NOTE
-due for f/u of menstrual bleeding, pt to make appt to discuss  -CBC stable  -counseled to monitor for symptoms of anemia  -continue iron supplementation, add Vit C for better absorption

## 2024-05-02 ENCOUNTER — OFFICE VISIT (OUTPATIENT)
Dept: ALLERGY | Facility: CLINIC | Age: 34
End: 2024-05-02
Payer: COMMERCIAL

## 2024-05-02 ENCOUNTER — LAB VISIT (OUTPATIENT)
Dept: LAB | Facility: HOSPITAL | Age: 34
End: 2024-05-02
Payer: COMMERCIAL

## 2024-05-02 VITALS — HEIGHT: 63 IN | WEIGHT: 246.69 LBS | BODY MASS INDEX: 43.71 KG/M2

## 2024-05-02 DIAGNOSIS — J45.909 ASTHMA, UNSPECIFIED ASTHMA SEVERITY, UNSPECIFIED WHETHER COMPLICATED, UNSPECIFIED WHETHER PERSISTENT: ICD-10-CM

## 2024-05-02 DIAGNOSIS — Z84.89 FAMILY HISTORY OF ATOPY IN FATHER: ICD-10-CM

## 2024-05-02 DIAGNOSIS — J45.901 EXACERBATION OF ASTHMA, UNSPECIFIED ASTHMA SEVERITY, UNSPECIFIED WHETHER PERSISTENT: ICD-10-CM

## 2024-05-02 DIAGNOSIS — J34.3 NASAL TURBINATE HYPERTROPHY: ICD-10-CM

## 2024-05-02 DIAGNOSIS — J45.909 ASTHMA, UNSPECIFIED ASTHMA SEVERITY, UNSPECIFIED WHETHER COMPLICATED, UNSPECIFIED WHETHER PERSISTENT: Primary | ICD-10-CM

## 2024-05-02 PROCEDURE — 86003 ALLG SPEC IGE CRUDE XTRC EA: CPT | Mod: 59 | Performed by: STUDENT IN AN ORGANIZED HEALTH CARE EDUCATION/TRAINING PROGRAM

## 2024-05-02 PROCEDURE — 86003 ALLG SPEC IGE CRUDE XTRC EA: CPT | Performed by: STUDENT IN AN ORGANIZED HEALTH CARE EDUCATION/TRAINING PROGRAM

## 2024-05-02 PROCEDURE — 99999 PR PBB SHADOW E&M-EST. PATIENT-LVL III: CPT | Mod: PBBFAC,,, | Performed by: STUDENT IN AN ORGANIZED HEALTH CARE EDUCATION/TRAINING PROGRAM

## 2024-05-02 PROCEDURE — 3008F BODY MASS INDEX DOCD: CPT | Mod: CPTII,S$GLB,, | Performed by: STUDENT IN AN ORGANIZED HEALTH CARE EDUCATION/TRAINING PROGRAM

## 2024-05-02 PROCEDURE — 1159F MED LIST DOCD IN RCRD: CPT | Mod: CPTII,S$GLB,, | Performed by: STUDENT IN AN ORGANIZED HEALTH CARE EDUCATION/TRAINING PROGRAM

## 2024-05-02 PROCEDURE — 99204 OFFICE O/P NEW MOD 45 MIN: CPT | Mod: S$GLB,,, | Performed by: STUDENT IN AN ORGANIZED HEALTH CARE EDUCATION/TRAINING PROGRAM

## 2024-05-02 PROCEDURE — 36415 COLL VENOUS BLD VENIPUNCTURE: CPT | Performed by: STUDENT IN AN ORGANIZED HEALTH CARE EDUCATION/TRAINING PROGRAM
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
## 2024-05-06 LAB
A ALTERNATA IGE QN: <0.1 KU/L
A FUMIGATUS IGE QN: <0.1 KU/L
BERMUDA GRASS IGE QN: <0.1 KU/L
CAT DANDER IGE QN: <0.1 KU/L
CEDAR IGE QN: <0.1 KU/L
D FARINAE IGE QN: 3.93 KU/L
D PTERONYSS IGE QN: 4.66 KU/L
DEPRECATED A ALTERNATA IGE RAST QL: NORMAL
DEPRECATED A FUMIGATUS IGE RAST QL: NORMAL
DEPRECATED BERMUDA GRASS IGE RAST QL: NORMAL
DEPRECATED CAT DANDER IGE RAST QL: NORMAL
DEPRECATED CEDAR IGE RAST QL: NORMAL
DEPRECATED D FARINAE IGE RAST QL: ABNORMAL
DEPRECATED D PTERONYSS IGE RAST QL: ABNORMAL
DEPRECATED DOG DANDER IGE RAST QL: ABNORMAL
DEPRECATED ELDER IGE RAST QL: NORMAL
DEPRECATED ENGL PLANTAIN IGE RAST QL: NORMAL
DEPRECATED PECAN/HICK TREE IGE RAST QL: NORMAL
DEPRECATED ROACH IGE RAST QL: ABNORMAL
DEPRECATED TIMOTHY IGE RAST QL: NORMAL
DEPRECATED WEST RAGWEED IGE RAST QL: NORMAL
DEPRECATED WHITE OAK IGE RAST QL: NORMAL
DOG DANDER IGE QN: 0.24 KU/L
ELDER IGE QN: <0.1 KU/L
ENGL PLANTAIN IGE QN: <0.1 KU/L
PECAN/HICK TREE IGE QN: <0.1 KU/L
ROACH IGE QN: 0.12 KU/L
TIMOTHY IGE QN: <0.1 KU/L
WEST RAGWEED IGE QN: <0.1 KU/L
WHITE OAK IGE QN: <0.1 KU/L

## 2024-08-04 ENCOUNTER — ON-DEMAND VIRTUAL (OUTPATIENT)
Dept: URGENT CARE | Facility: CLINIC | Age: 34
End: 2024-08-04
Payer: COMMERCIAL

## 2024-08-04 DIAGNOSIS — J45.21 MILD INTERMITTENT ASTHMA WITH ACUTE EXACERBATION: Primary | ICD-10-CM

## 2024-08-04 PROCEDURE — 99213 OFFICE O/P EST LOW 20 MIN: CPT | Mod: 95,,, | Performed by: NURSE PRACTITIONER

## 2024-08-04 RX ORDER — PREDNISONE 20 MG/1
20 TABLET ORAL DAILY
Qty: 5 TABLET | Refills: 0 | Status: SHIPPED | OUTPATIENT
Start: 2024-08-04 | End: 2024-08-09

## 2024-11-07 DIAGNOSIS — J45.901 MODERATE ASTHMA WITH ACUTE EXACERBATION, UNSPECIFIED WHETHER PERSISTENT: ICD-10-CM

## 2024-11-11 RX ORDER — ALBUTEROL SULFATE 0.83 MG/ML
SOLUTION RESPIRATORY (INHALATION)
Qty: 90 ML | Refills: 3 | Status: SHIPPED | OUTPATIENT
Start: 2024-11-11

## 2024-11-29 ENCOUNTER — HOSPITAL ENCOUNTER (EMERGENCY)
Facility: HOSPITAL | Age: 34
Discharge: HOME | End: 2024-11-29
Attending: EMERGENCY MEDICINE
Payer: COMMERCIAL

## 2024-11-29 VITALS
TEMPERATURE: 99 F | OXYGEN SATURATION: 96 % | RESPIRATION RATE: 18 BRPM | HEART RATE: 98 BPM | BODY MASS INDEX: 41.45 KG/M2 | WEIGHT: 234 LBS | DIASTOLIC BLOOD PRESSURE: 98 MMHG | SYSTOLIC BLOOD PRESSURE: 165 MMHG

## 2024-11-29 DIAGNOSIS — R06.02 SHORTNESS OF BREATH: ICD-10-CM

## 2024-11-29 DIAGNOSIS — J45.901 EXACERBATION OF ASTHMA, UNSPECIFIED ASTHMA SEVERITY, UNSPECIFIED WHETHER PERSISTENT: Primary | ICD-10-CM

## 2024-11-29 DIAGNOSIS — J45.901 MODERATE ASTHMA WITH ACUTE EXACERBATION, UNSPECIFIED WHETHER PERSISTENT: ICD-10-CM

## 2024-11-29 LAB
ALBUMIN SERPL BCP-MCNC: 3.8 G/DL (ref 3.5–5.2)
ALP SERPL-CCNC: 57 U/L (ref 40–150)
ALT SERPL W/O P-5'-P-CCNC: 13 U/L (ref 10–44)
ANION GAP SERPL CALC-SCNC: 10 MMOL/L (ref 8–16)
AST SERPL-CCNC: 17 U/L (ref 10–40)
B-HCG UR QL: NEGATIVE
BASOPHILS # BLD AUTO: 0.06 K/UL (ref 0–0.2)
BASOPHILS NFR BLD: 0.7 % (ref 0–1.9)
BILIRUB SERPL-MCNC: 0.4 MG/DL (ref 0.1–1)
BNP SERPL-MCNC: 71 PG/ML (ref 0–99)
BUN SERPL-MCNC: 8 MG/DL (ref 6–20)
CALCIUM SERPL-MCNC: 9 MG/DL (ref 8.7–10.5)
CHLORIDE SERPL-SCNC: 108 MMOL/L (ref 95–110)
CO2 SERPL-SCNC: 24 MMOL/L (ref 23–29)
CREAT SERPL-MCNC: 0.8 MG/DL (ref 0.5–1.4)
CTP QC/QA: YES
D DIMER PPP IA.FEU-MCNC: 0.4 MG/L FEU
DIFFERENTIAL METHOD BLD: ABNORMAL
EOSINOPHIL # BLD AUTO: 0.5 K/UL (ref 0–0.5)
EOSINOPHIL NFR BLD: 6.1 % (ref 0–8)
ERYTHROCYTE [DISTWIDTH] IN BLOOD BY AUTOMATED COUNT: 17 % (ref 11.5–14.5)
EST. GFR  (NO RACE VARIABLE): >60 ML/MIN/1.73 M^2
GLUCOSE SERPL-MCNC: 87 MG/DL (ref 70–110)
HCT VFR BLD AUTO: 33.4 % (ref 37–48.5)
HGB BLD-MCNC: 10.1 G/DL (ref 12–16)
IMM GRANULOCYTES # BLD AUTO: 0.02 K/UL (ref 0–0.04)
IMM GRANULOCYTES NFR BLD AUTO: 0.2 % (ref 0–0.5)
INFLUENZA A, MOLECULAR: NOT DETECTED
INFLUENZA B, MOLECULAR: NOT DETECTED
LYMPHOCYTES # BLD AUTO: 2.2 K/UL (ref 1–4.8)
LYMPHOCYTES NFR BLD: 26 % (ref 18–48)
MCH RBC QN AUTO: 23.5 PG (ref 27–31)
MCHC RBC AUTO-ENTMCNC: 30.2 G/DL (ref 32–36)
MCV RBC AUTO: 78 FL (ref 82–98)
MONOCYTES # BLD AUTO: 0.6 K/UL (ref 0.3–1)
MONOCYTES NFR BLD: 6.8 % (ref 4–15)
NEUTROPHILS # BLD AUTO: 5.1 K/UL (ref 1.8–7.7)
NEUTROPHILS NFR BLD: 60.2 % (ref 38–73)
NRBC BLD-RTO: 0 /100 WBC
OHS QRS DURATION: 72 MS
OHS QTC CALCULATION: 433 MS
PLATELET # BLD AUTO: 300 K/UL (ref 150–450)
PMV BLD AUTO: 10.4 FL (ref 9.2–12.9)
POTASSIUM SERPL-SCNC: 3.8 MMOL/L (ref 3.5–5.1)
PROT SERPL-MCNC: 7.9 G/DL (ref 6–8.4)
RBC # BLD AUTO: 4.3 M/UL (ref 4–5.4)
RSV AG BY MOLECULAR METHOD: NOT DETECTED
SARS-COV-2 RNA RESP QL NAA+PROBE: NOT DETECTED
SODIUM SERPL-SCNC: 142 MMOL/L (ref 136–145)
TROPONIN I SERPL DL<=0.01 NG/ML-MCNC: <0.006 NG/ML (ref 0–0.03)
WBC # BLD AUTO: 8.49 K/UL (ref 3.9–12.7)

## 2024-11-29 PROCEDURE — 80053 COMPREHEN METABOLIC PANEL: CPT | Performed by: EMERGENCY MEDICINE

## 2024-11-29 PROCEDURE — 93010 ELECTROCARDIOGRAM REPORT: CPT | Mod: ,,, | Performed by: INTERNAL MEDICINE

## 2024-11-29 PROCEDURE — 96374 THER/PROPH/DIAG INJ IV PUSH: CPT

## 2024-11-29 PROCEDURE — 63600175 PHARM REV CODE 636 W HCPCS: Performed by: EMERGENCY MEDICINE

## 2024-11-29 PROCEDURE — 85379 FIBRIN DEGRADATION QUANT: CPT | Performed by: EMERGENCY MEDICINE

## 2024-11-29 PROCEDURE — 93005 ELECTROCARDIOGRAM TRACING: CPT

## 2024-11-29 PROCEDURE — 25000242 PHARM REV CODE 250 ALT 637 W/ HCPCS: Performed by: EMERGENCY MEDICINE

## 2024-11-29 PROCEDURE — 0241U SARS-COV2 (COVID) WITH FLU/RSV BY PCR: CPT | Performed by: EMERGENCY MEDICINE

## 2024-11-29 PROCEDURE — 83880 ASSAY OF NATRIURETIC PEPTIDE: CPT | Performed by: EMERGENCY MEDICINE

## 2024-11-29 PROCEDURE — 94761 N-INVAS EAR/PLS OXIMETRY MLT: CPT

## 2024-11-29 PROCEDURE — 85025 COMPLETE CBC W/AUTO DIFF WBC: CPT | Performed by: EMERGENCY MEDICINE

## 2024-11-29 PROCEDURE — 81025 URINE PREGNANCY TEST: CPT | Performed by: PHYSICIAN ASSISTANT

## 2024-11-29 PROCEDURE — 84484 ASSAY OF TROPONIN QUANT: CPT | Performed by: EMERGENCY MEDICINE

## 2024-11-29 PROCEDURE — 94640 AIRWAY INHALATION TREATMENT: CPT | Mod: XB

## 2024-11-29 PROCEDURE — 99285 EMERGENCY DEPT VISIT HI MDM: CPT | Mod: 25

## 2024-11-29 RX ORDER — ALBUTEROL SULFATE 90 UG/1
1-2 INHALANT RESPIRATORY (INHALATION) EVERY 4 HOURS PRN
Qty: 18 G | Refills: 11 | Status: SHIPPED | OUTPATIENT
Start: 2024-11-29

## 2024-11-29 RX ORDER — IPRATROPIUM BROMIDE AND ALBUTEROL SULFATE 2.5; .5 MG/3ML; MG/3ML
3 SOLUTION RESPIRATORY (INHALATION)
Status: DISCONTINUED | OUTPATIENT
Start: 2024-11-29 | End: 2024-11-29

## 2024-11-29 RX ORDER — METHYLPREDNISOLONE SOD SUCC 125 MG
125 VIAL (EA) INJECTION
Status: COMPLETED | OUTPATIENT
Start: 2024-11-29 | End: 2024-11-29

## 2024-11-29 RX ORDER — PREDNISONE 20 MG/1
40 TABLET ORAL DAILY
Qty: 10 TABLET | Refills: 0 | Status: SHIPPED | OUTPATIENT
Start: 2024-11-30 | End: 2024-12-05

## 2024-11-29 RX ORDER — IPRATROPIUM BROMIDE AND ALBUTEROL SULFATE 2.5; .5 MG/3ML; MG/3ML
9 SOLUTION RESPIRATORY (INHALATION) ONCE
Status: COMPLETED | OUTPATIENT
Start: 2024-11-29 | End: 2024-11-29

## 2024-11-29 RX ORDER — ALBUTEROL SULFATE 0.83 MG/ML
2.5 SOLUTION RESPIRATORY (INHALATION) EVERY 6 HOURS PRN
Qty: 90 ML | Refills: 3 | Status: SHIPPED | OUTPATIENT
Start: 2024-11-29 | End: 2025-11-29

## 2024-11-29 RX ORDER — ALBUTEROL SULFATE 2.5 MG/.5ML
2.5 SOLUTION RESPIRATORY (INHALATION)
Status: COMPLETED | OUTPATIENT
Start: 2024-11-29 | End: 2024-11-29

## 2024-11-29 RX ADMIN — ALBUTEROL SULFATE 2.5 MG: 2.5 SOLUTION RESPIRATORY (INHALATION) at 05:11

## 2024-11-29 RX ADMIN — METHYLPREDNISOLONE SODIUM SUCCINATE 125 MG: 125 INJECTION, POWDER, FOR SOLUTION INTRAMUSCULAR; INTRAVENOUS at 04:11

## 2024-11-29 RX ADMIN — IPRATROPIUM BROMIDE AND ALBUTEROL SULFATE 9 ML: .5; 3 SOLUTION RESPIRATORY (INHALATION) at 04:11

## 2024-11-29 NOTE — FIRST PROVIDER EVALUATION
Emergency Department TeleTriage Encounter Note      CHIEF COMPLAINT    Chief Complaint   Patient presents with    Shortness of Breath     2 breathing treatments and multiple inhaler uses. No relief        VITAL SIGNS   Initial Vitals   BP Pulse Resp Temp SpO2   11/29/24 1351 11/29/24 1351 11/29/24 1351 11/29/24 1351 11/29/24 1353   (!) 170/101 105 20 98.7 °F (37.1 °C) 95 %      MAP       --                   ALLERGIES    Review of patient's allergies indicates:  No Known Allergies    PROVIDER TRIAGE NOTE  Patient presents with wheezing, shortness of breath and cough. Inhaler and nebs not helping. She took steroids last week and improved, but now symptoms have returned.       ORDERS  Labs Reviewed - No data to display    ED Orders (720h ago, onward)      None              Virtual Visit Note: The provider triage portion of this emergency department evaluation and documentation was performed via [employer] a HIPAA-compliant telemedicine application, in concert with a tele-presenter in the room. A face to face patient evaluation with one of my colleagues will occur once the patient is placed in an emergency department room.      DISCLAIMER: This note was prepared with [employer] voice recognition transcription software. Garbled syntax, mangled pronouns, and other bizarre constructions may be attributed to that software system.

## 2024-11-29 NOTE — ED PROVIDER NOTES
Encounter Date: 2024       History     Chief Complaint   Patient presents with    Shortness of Breath     2 breathing treatments and multiple inhaler uses. No relief      HPI  33-year-old female who presents with shortness of breath.  About a week ago she was diagnosed with an has been exacerbation and given a course of steroids.  She got better but then symptoms returned.  Now feeling worse.  Feels very short of breath and wheezy.  States she has been using her inhalers and nebulizers at home which only provide temporary relief.  She reports mild chest pain now that she was breathing again so does have pain with inspiration.  Pain is not localized.  No fevers or chills.  She does have a cough but it is not productive.  No leg swelling, history of DVT or PE, active cancer, recent travel or surgery, hormone/contraceptive us.  Review of patient's allergies indicates:  No Known Allergies  Past Medical History:   Diagnosis Date    Anemia     Asthma     Chest tightness 2024    Menorrhagia     Metabolic acidosis 2024     History reviewed. No pertinent surgical history.  Family History   Problem Relation Name Age of Onset    Asthma Father      Asthma Sister      Asthma Brother      Asthma Paternal Aunt      Asthma Paternal Uncle      Breast cancer Neg Hx      Colon cancer Neg Hx      Ovarian cancer Neg Hx       Social History     Tobacco Use    Smoking status: Former     Types: Cigars     Start date: 2019     Quit date: 2022     Years since quittin.9     Passive exposure: Current    Smokeless tobacco: Never   Substance Use Topics    Alcohol use: Yes     Comment: special occasions    Drug use: Not Currently     Comment: marijuana in past     Review of Systems    Physical Exam     Initial Vitals   BP Pulse Resp Temp SpO2   24 1351 24 1351 24 1351 24 1351 24 1353   (!) 170/101 105 20 98.7 °F (37.1 °C) 95 %      MAP       --                Physical Exam    Nursing  note and vitals reviewed.  Constitutional: She appears well-developed and well-nourished. No distress.   HENT:   Head: Normocephalic and atraumatic.   Nose: Nose normal.   Eyes: Conjunctivae and EOM are normal. Pupils are equal, round, and reactive to light.   Neck:   Normal range of motion.  Cardiovascular:  Regular rhythm and normal heart sounds.           Tachycardic   Pulmonary/Chest:   Is speaking in full sentences but visibly short of breath.  Not in distress.  Diffuse expiratory wheezes   Abdominal: Abdomen is soft. She exhibits no distension. There is no abdominal tenderness. There is no rebound and no guarding.   Musculoskeletal:         General: No tenderness or edema. Normal range of motion.      Cervical back: Normal range of motion.     Neurological: She is alert and oriented to person, place, and time. She has normal strength.   Skin: Skin is warm and dry. Capillary refill takes less than 2 seconds.   Psychiatric: She has a normal mood and affect.         ED Course   Procedures  Labs Reviewed   CBC W/ AUTO DIFFERENTIAL - Abnormal       Result Value    WBC 8.49      RBC 4.30      Hemoglobin 10.1 (*)     Hematocrit 33.4 (*)     MCV 78 (*)     MCH 23.5 (*)     MCHC 30.2 (*)     RDW 17.0 (*)     Platelets 300      MPV 10.4      Immature Granulocytes 0.2      Gran # (ANC) 5.1      Immature Grans (Abs) 0.02      Lymph # 2.2      Mono # 0.6      Eos # 0.5      Baso # 0.06      nRBC 0      Gran % 60.2      Lymph % 26.0      Mono % 6.8      Eosinophil % 6.1      Basophil % 0.7      Differential Method Automated     B-TYPE NATRIURETIC PEPTIDE   COMPREHENSIVE METABOLIC PANEL   D DIMER, QUANTITATIVE   TROPONIN I   SARS-COV2 (COVID) WITH FLU/RSV BY PCR   POCT URINE PREGNANCY    POC Preg Test, Ur Negative       Acceptable Yes            Imaging Results              X-Ray Chest AP Portable (In process)                      Medications   methylPREDNISolone sodium succinate injection 125 mg (125 mg  Intravenous Given 11/29/24 1635)   albuterol-ipratropium 2.5 mg-0.5 mg/3 mL nebulizer solution 9 mL (9 mLs Nebulization Given 11/29/24 1640)     Medical Decision Making  33-year-old female who presents with shortness of breath.  She had a recent asthma exacerbation that improved with steroids but came back again.  Now with a cough which is not productive.  On exam, she was not in distress but is having some dyspnea with talking and does have diffuse expiratory wheezes consistent with asthma exacerbation.  Cause could just be pneumonia or viral infection however she was tachycardic and it recurrent so we will need to rule out PE as the cause.  She was low risk enough that we can do a D-dimer to rule it out.  If positive would need a CT.  Otherwise we will get viral testing and basic labs to rule out any other more concerning causes.  I have ordered DuoNebs and IV Solu-Medrol.  Disposition pending full workup.  Signed out to Dr. Dugan.    Amount and/or Complexity of Data Reviewed  Labs: ordered.  ECG/medicine tests: ordered and independent interpretation performed.     Details: Sinus, regular, rate 80s, normal intervals, normal ST segments, no STEMI    Risk  Prescription drug management.                                      Clinical Impression:  Final diagnoses:  [J45.901] Exacerbation of asthma, unspecified asthma severity, unspecified whether persistent (Primary)                 Kim Sandoval MD  11/29/24 6905

## 2024-11-29 NOTE — ED NOTES
Patient complains of worsening cough and chest tightness > 1 week= states was seen and treated for same complaint with oral steroids approx 1 week ago with no relieff

## 2024-11-30 NOTE — PROVIDER PROGRESS NOTES - EMERGENCY DEPT.
FEncounter Date: 11/29/2024    ED Physician Progress Notes        Physician Note:   Feels much better, breathing normally, requesting discharge

## 2024-12-17 ENCOUNTER — HOSPITAL ENCOUNTER (INPATIENT)
Facility: HOSPITAL | Age: 34
LOS: 2 days | Discharge: HOME | DRG: 202 | End: 2024-12-19
Attending: EMERGENCY MEDICINE | Admitting: INTERNAL MEDICINE
Payer: COMMERCIAL

## 2024-12-17 DIAGNOSIS — J45.909 ASTHMA: ICD-10-CM

## 2024-12-17 DIAGNOSIS — J45.40 MODERATE PERSISTENT ASTHMA WITHOUT COMPLICATION: Primary | ICD-10-CM

## 2024-12-17 LAB
ALBUMIN SERPL BCP-MCNC: 4.1 G/DL (ref 3.5–5.2)
ALLENS TEST: ABNORMAL
ALP SERPL-CCNC: 63 U/L (ref 40–150)
ALT SERPL W/O P-5'-P-CCNC: 16 U/L (ref 10–44)
ANION GAP SERPL CALC-SCNC: 12 MMOL/L (ref 8–16)
AST SERPL-CCNC: 33 U/L (ref 10–40)
B-HCG UR QL: NEGATIVE
BASOPHILS # BLD AUTO: 0.12 K/UL (ref 0–0.2)
BASOPHILS NFR BLD: 1 % (ref 0–1.9)
BILIRUB SERPL-MCNC: 0.4 MG/DL (ref 0.1–1)
BNP SERPL-MCNC: 25 PG/ML (ref 0–99)
BUN SERPL-MCNC: 11 MG/DL (ref 6–20)
CALCIUM SERPL-MCNC: 8.8 MG/DL (ref 8.7–10.5)
CHLORIDE SERPL-SCNC: 107 MMOL/L (ref 95–110)
CO2 SERPL-SCNC: 19 MMOL/L (ref 23–29)
CREAT SERPL-MCNC: 0.9 MG/DL (ref 0.5–1.4)
CTP QC/QA: YES
D DIMER PPP IA.FEU-MCNC: 0.57 MG/L FEU
DELSYS: ABNORMAL
DIFFERENTIAL METHOD BLD: ABNORMAL
EOSINOPHIL # BLD AUTO: 0.9 K/UL (ref 0–0.5)
EOSINOPHIL NFR BLD: 7.2 % (ref 0–8)
ERYTHROCYTE [DISTWIDTH] IN BLOOD BY AUTOMATED COUNT: 17.4 % (ref 11.5–14.5)
EST. GFR  (NO RACE VARIABLE): >60 ML/MIN/1.73 M^2
GLUCOSE SERPL-MCNC: 138 MG/DL (ref 70–110)
GLUCOSE SERPL-MCNC: 209 MG/DL (ref 70–110)
HCO3 UR-SCNC: 27 MMOL/L (ref 24–28)
HCO3 UR-SCNC: 28.1 MMOL/L (ref 24–28)
HCO3 UR-SCNC: 28.1 MMOL/L (ref 24–28)
HCT VFR BLD AUTO: 37.5 % (ref 37–48.5)
HGB BLD-MCNC: 11.1 G/DL (ref 12–16)
IMM GRANULOCYTES # BLD AUTO: 0.04 K/UL (ref 0–0.04)
IMM GRANULOCYTES NFR BLD AUTO: 0.3 % (ref 0–0.5)
LYMPHOCYTES # BLD AUTO: 2.8 K/UL (ref 1–4.8)
LYMPHOCYTES NFR BLD: 22.4 % (ref 18–48)
MAGNESIUM SERPL-MCNC: 2.2 MG/DL (ref 1.6–2.6)
MCH RBC QN AUTO: 23.5 PG (ref 27–31)
MCHC RBC AUTO-ENTMCNC: 29.6 G/DL (ref 32–36)
MCV RBC AUTO: 79 FL (ref 82–98)
MODE: ABNORMAL
MONOCYTES # BLD AUTO: 0.9 K/UL (ref 0.3–1)
MONOCYTES NFR BLD: 7.2 % (ref 4–15)
NEUTROPHILS # BLD AUTO: 7.8 K/UL (ref 1.8–7.7)
NEUTROPHILS NFR BLD: 61.9 % (ref 38–73)
NRBC BLD-RTO: 0 /100 WBC
OHS QRS DURATION: 80 MS
OHS QRS DURATION: 84 MS
OHS QTC CALCULATION: 474 MS
OHS QTC CALCULATION: 490 MS
PCO2 BLDA: 59.9 MMHG (ref 35–45)
PCO2 BLDA: 64 MMHG (ref 35–45)
PCO2 BLDA: 83.2 MMHG (ref 35–45)
PH SMN: 7.14 [PH] (ref 7.35–7.45)
PH SMN: 7.25 [PH] (ref 7.35–7.45)
PH SMN: 7.26 [PH] (ref 7.35–7.45)
PHOSPHATE SERPL-MCNC: 5.1 MG/DL (ref 2.7–4.5)
PLATELET # BLD AUTO: 315 K/UL (ref 150–450)
PMV BLD AUTO: 12.8 FL (ref 9.2–12.9)
PO2 BLDA: 29 MMHG (ref 40–60)
PO2 BLDA: 44 MMHG (ref 40–60)
PO2 BLDA: 56 MMHG (ref 40–60)
POC BE: -1 MMOL/L
POC BE: 0 MMOL/L
POC BE: 1 MMOL/L
POC SATURATED O2: 44 % (ref 95–100)
POC SATURATED O2: 63 % (ref 95–100)
POC SATURATED O2: 82 % (ref 95–100)
POC TCO2: 29 MMOL/L (ref 24–29)
POC TCO2: 30 MMOL/L (ref 24–29)
POC TCO2: 31 MMOL/L (ref 24–29)
POCT GLUCOSE: 112 MG/DL (ref 70–110)
POCT GLUCOSE: 144 MG/DL (ref 70–110)
POCT GLUCOSE: 207 MG/DL (ref 70–110)
POCT GLUCOSE: 209 MG/DL (ref 70–110)
POTASSIUM SERPL-SCNC: 4.6 MMOL/L (ref 3.5–5.1)
PROT SERPL-MCNC: 9 G/DL (ref 6–8.4)
RBC # BLD AUTO: 4.72 M/UL (ref 4–5.4)
SAMPLE: ABNORMAL
SITE: ABNORMAL
SODIUM SERPL-SCNC: 138 MMOL/L (ref 136–145)
TROPONIN I SERPL DL<=0.01 NG/ML-MCNC: <3 NG/L (ref 0–14)
WBC # BLD AUTO: 12.63 K/UL (ref 3.9–12.7)

## 2024-12-17 PROCEDURE — 20000000 HC ICU ROOM

## 2024-12-17 PROCEDURE — 81025 URINE PREGNANCY TEST: CPT

## 2024-12-17 PROCEDURE — 99291 CRITICAL CARE FIRST HOUR: CPT | Mod: ,,, | Performed by: INTERNAL MEDICINE

## 2024-12-17 PROCEDURE — 63600175 PHARM REV CODE 636 W HCPCS: Performed by: STUDENT IN AN ORGANIZED HEALTH CARE EDUCATION/TRAINING PROGRAM

## 2024-12-17 PROCEDURE — 99900035 HC TECH TIME PER 15 MIN (STAT)

## 2024-12-17 PROCEDURE — 63600175 PHARM REV CODE 636 W HCPCS

## 2024-12-17 PROCEDURE — 25000242 PHARM REV CODE 250 ALT 637 W/ HCPCS

## 2024-12-17 PROCEDURE — 27000190 HC CPAP FULL FACE MASK W/VALVE

## 2024-12-17 PROCEDURE — 94799 UNLISTED PULMONARY SVC/PX: CPT

## 2024-12-17 PROCEDURE — 5A09357 ASSISTANCE WITH RESPIRATORY VENTILATION, LESS THAN 24 CONSECUTIVE HOURS, CONTINUOUS POSITIVE AIRWAY PRESSURE: ICD-10-PCS | Performed by: EMERGENCY MEDICINE

## 2024-12-17 PROCEDURE — 83880 ASSAY OF NATRIURETIC PEPTIDE: CPT | Performed by: EMERGENCY MEDICINE

## 2024-12-17 PROCEDURE — 93010 ELECTROCARDIOGRAM REPORT: CPT | Mod: ,,, | Performed by: INTERNAL MEDICINE

## 2024-12-17 PROCEDURE — 96375 TX/PRO/DX INJ NEW DRUG ADDON: CPT

## 2024-12-17 PROCEDURE — 84484 ASSAY OF TROPONIN QUANT: CPT | Performed by: EMERGENCY MEDICINE

## 2024-12-17 PROCEDURE — 87486 CHLMYD PNEUM DNA AMP PROBE: CPT | Performed by: INTERNAL MEDICINE

## 2024-12-17 PROCEDURE — 25000242 PHARM REV CODE 250 ALT 637 W/ HCPCS: Performed by: STUDENT IN AN ORGANIZED HEALTH CARE EDUCATION/TRAINING PROGRAM

## 2024-12-17 PROCEDURE — 94761 N-INVAS EAR/PLS OXIMETRY MLT: CPT | Mod: XB

## 2024-12-17 PROCEDURE — 80053 COMPREHEN METABOLIC PANEL: CPT | Performed by: EMERGENCY MEDICINE

## 2024-12-17 PROCEDURE — 94640 AIRWAY INHALATION TREATMENT: CPT | Mod: XB

## 2024-12-17 PROCEDURE — 84100 ASSAY OF PHOSPHORUS: CPT | Performed by: EMERGENCY MEDICINE

## 2024-12-17 PROCEDURE — 99285 EMERGENCY DEPT VISIT HI MDM: CPT | Mod: 25

## 2024-12-17 PROCEDURE — 96365 THER/PROPH/DIAG IV INF INIT: CPT

## 2024-12-17 PROCEDURE — 85379 FIBRIN DEGRADATION QUANT: CPT | Performed by: EMERGENCY MEDICINE

## 2024-12-17 PROCEDURE — 82803 BLOOD GASES ANY COMBINATION: CPT

## 2024-12-17 PROCEDURE — 27100171 HC OXYGEN HIGH FLOW UP TO 24 HOURS

## 2024-12-17 PROCEDURE — 85025 COMPLETE CBC W/AUTO DIFF WBC: CPT | Performed by: EMERGENCY MEDICINE

## 2024-12-17 PROCEDURE — 93005 ELECTROCARDIOGRAM TRACING: CPT

## 2024-12-17 PROCEDURE — 94660 CPAP INITIATION&MGMT: CPT

## 2024-12-17 PROCEDURE — 94640 AIRWAY INHALATION TREATMENT: CPT

## 2024-12-17 PROCEDURE — 25000003 PHARM REV CODE 250: Performed by: STUDENT IN AN ORGANIZED HEALTH CARE EDUCATION/TRAINING PROGRAM

## 2024-12-17 PROCEDURE — 25000003 PHARM REV CODE 250: Performed by: INTERNAL MEDICINE

## 2024-12-17 PROCEDURE — 83735 ASSAY OF MAGNESIUM: CPT | Performed by: EMERGENCY MEDICINE

## 2024-12-17 RX ORDER — INSULIN ASPART 100 [IU]/ML
0-5 INJECTION, SOLUTION INTRAVENOUS; SUBCUTANEOUS EVERY 6 HOURS PRN
Status: DISCONTINUED | OUTPATIENT
Start: 2024-12-17 | End: 2024-12-19 | Stop reason: HOSPADM

## 2024-12-17 RX ORDER — FLUTICASONE FUROATE AND VILANTEROL 200; 25 UG/1; UG/1
1 POWDER RESPIRATORY (INHALATION) DAILY
Status: DISCONTINUED | OUTPATIENT
Start: 2024-12-17 | End: 2024-12-19 | Stop reason: HOSPADM

## 2024-12-17 RX ORDER — ONDANSETRON HYDROCHLORIDE 2 MG/ML
4 INJECTION, SOLUTION INTRAVENOUS
Status: COMPLETED | OUTPATIENT
Start: 2024-12-17 | End: 2024-12-17

## 2024-12-17 RX ORDER — SODIUM CHLORIDE 0.9 % (FLUSH) 0.9 %
10 SYRINGE (ML) INJECTION
Status: DISCONTINUED | OUTPATIENT
Start: 2024-12-17 | End: 2024-12-19 | Stop reason: HOSPADM

## 2024-12-17 RX ORDER — KETAMINE HCL IN 0.9 % NACL 50 MG/5 ML
20 SYRINGE (ML) INTRAVENOUS
Status: DISCONTINUED | OUTPATIENT
Start: 2024-12-17 | End: 2024-12-17

## 2024-12-17 RX ORDER — MAGNESIUM SULFATE HEPTAHYDRATE 40 MG/ML
2 INJECTION, SOLUTION INTRAVENOUS
Status: COMPLETED | OUTPATIENT
Start: 2024-12-17 | End: 2024-12-17

## 2024-12-17 RX ORDER — ACETAMINOPHEN 325 MG/1
650 TABLET ORAL EVERY 6 HOURS PRN
Status: DISCONTINUED | OUTPATIENT
Start: 2024-12-17 | End: 2024-12-19 | Stop reason: HOSPADM

## 2024-12-17 RX ORDER — ENOXAPARIN SODIUM 100 MG/ML
40 INJECTION SUBCUTANEOUS EVERY 12 HOURS
Status: DISCONTINUED | OUTPATIENT
Start: 2024-12-17 | End: 2024-12-19 | Stop reason: HOSPADM

## 2024-12-17 RX ORDER — IPRATROPIUM BROMIDE AND ALBUTEROL SULFATE 2.5; .5 MG/3ML; MG/3ML
3 SOLUTION RESPIRATORY (INHALATION) EVERY 4 HOURS
Status: DISCONTINUED | OUTPATIENT
Start: 2024-12-17 | End: 2024-12-19 | Stop reason: HOSPADM

## 2024-12-17 RX ORDER — FLUTICASONE PROPIONATE AND SALMETEROL 500; 50 UG/1; UG/1
1 POWDER RESPIRATORY (INHALATION) 2 TIMES DAILY
Status: DISCONTINUED | OUTPATIENT
Start: 2024-12-17 | End: 2024-12-17

## 2024-12-17 RX ORDER — MAGNESIUM SULFATE HEPTAHYDRATE 40 MG/ML
INJECTION, SOLUTION INTRAVENOUS
Status: COMPLETED
Start: 2024-12-17 | End: 2024-12-17

## 2024-12-17 RX ORDER — IPRATROPIUM BROMIDE AND ALBUTEROL SULFATE 2.5; .5 MG/3ML; MG/3ML
3 SOLUTION RESPIRATORY (INHALATION)
Status: COMPLETED | OUTPATIENT
Start: 2024-12-17 | End: 2024-12-17

## 2024-12-17 RX ORDER — ALBUTEROL SULFATE 2.5 MG/.5ML
2.5 SOLUTION RESPIRATORY (INHALATION)
Status: COMPLETED | OUTPATIENT
Start: 2024-12-17 | End: 2024-12-17

## 2024-12-17 RX ORDER — IPRATROPIUM BROMIDE AND ALBUTEROL SULFATE 2.5; .5 MG/3ML; MG/3ML
3 SOLUTION RESPIRATORY (INHALATION)
Status: DISCONTINUED | OUTPATIENT
Start: 2024-12-17 | End: 2024-12-17

## 2024-12-17 RX ORDER — KETAMINE HCL IN 0.9 % NACL 50 MG/5 ML
20 SYRINGE (ML) INTRAVENOUS ONCE
Status: DISCONTINUED | OUTPATIENT
Start: 2024-12-17 | End: 2024-12-17

## 2024-12-17 RX ORDER — MUPIROCIN 20 MG/G
OINTMENT TOPICAL 2 TIMES DAILY
Status: DISCONTINUED | OUTPATIENT
Start: 2024-12-17 | End: 2024-12-19 | Stop reason: HOSPADM

## 2024-12-17 RX ORDER — GLUCAGON 1 MG
1 KIT INJECTION
Status: DISCONTINUED | OUTPATIENT
Start: 2024-12-17 | End: 2024-12-19 | Stop reason: HOSPADM

## 2024-12-17 RX ORDER — ALBUTEROL SULFATE 5 MG/ML
5 SOLUTION RESPIRATORY (INHALATION) CONTINUOUS
Status: DISCONTINUED | OUTPATIENT
Start: 2024-12-17 | End: 2024-12-18

## 2024-12-17 RX ORDER — ONDANSETRON HYDROCHLORIDE 2 MG/ML
4 INJECTION, SOLUTION INTRAVENOUS EVERY 8 HOURS PRN
Status: DISCONTINUED | OUTPATIENT
Start: 2024-12-17 | End: 2024-12-19 | Stop reason: HOSPADM

## 2024-12-17 RX ADMIN — METHYLPREDNISOLONE SODIUM SUCCINATE 60 MG: 40 INJECTION, POWDER, FOR SOLUTION INTRAMUSCULAR; INTRAVENOUS at 06:12

## 2024-12-17 RX ADMIN — IPRATROPIUM BROMIDE AND ALBUTEROL SULFATE 3 ML: 2.5; .5 SOLUTION RESPIRATORY (INHALATION) at 11:12

## 2024-12-17 RX ADMIN — ONDANSETRON 4 MG: 2 INJECTION INTRAMUSCULAR; INTRAVENOUS at 03:12

## 2024-12-17 RX ADMIN — INSULIN ASPART 2 UNITS: 100 INJECTION, SOLUTION INTRAVENOUS; SUBCUTANEOUS at 11:12

## 2024-12-17 RX ADMIN — ENOXAPARIN SODIUM 40 MG: 40 INJECTION SUBCUTANEOUS at 09:12

## 2024-12-17 RX ADMIN — METHYLPREDNISOLONE SODIUM SUCCINATE 60 MG: 40 INJECTION, POWDER, FOR SOLUTION INTRAMUSCULAR; INTRAVENOUS at 05:12

## 2024-12-17 RX ADMIN — IPRATROPIUM BROMIDE AND ALBUTEROL SULFATE 3 ML: 2.5; .5 SOLUTION RESPIRATORY (INHALATION) at 03:12

## 2024-12-17 RX ADMIN — ACETAMINOPHEN 650 MG: 325 TABLET ORAL at 05:12

## 2024-12-17 RX ADMIN — MAGNESIUM SULFATE 2 G: 2 INJECTION INTRAVENOUS at 03:12

## 2024-12-17 RX ADMIN — IPRATROPIUM BROMIDE AND ALBUTEROL SULFATE 3 ML: 2.5; .5 SOLUTION RESPIRATORY (INHALATION) at 06:12

## 2024-12-17 RX ADMIN — ALBUTEROL SULFATE 2.5 MG: 2.5 SOLUTION RESPIRATORY (INHALATION) at 03:12

## 2024-12-17 RX ADMIN — RACEPINEPHRINE HYDROCHLORIDE 0.5 ML: 11.25 SOLUTION RESPIRATORY (INHALATION) at 03:12

## 2024-12-17 RX ADMIN — MUPIROCIN: 20 OINTMENT TOPICAL at 09:12

## 2024-12-17 RX ADMIN — ALBUTEROL SULFATE 5 MG/HR: 2.5 SOLUTION RESPIRATORY (INHALATION) at 04:12

## 2024-12-17 RX ADMIN — FLUTICASONE FUROATE AND VILANTEROL TRIFENATATE 1 PUFF: 200; 25 POWDER RESPIRATORY (INHALATION) at 10:12

## 2024-12-17 RX ADMIN — MAGNESIUM SULFATE HEPTAHYDRATE 2 G: 40 INJECTION, SOLUTION INTRAVENOUS at 03:12

## 2024-12-17 NOTE — ED PROVIDER NOTES
Source of History:  Patient and chart    Chief complaint:  asthma exacerbation (SOB starting last night. Received 125 solumedrol, duoneb, and albuterol. +Wheeze, RA sat 87%. )      HPI:  Molly Aly is a 34 y.o. female with a medical history as below presents to the emergency department with a chief complaint of asthma exacerbation.  Patient has known history of asthma.  She had an attack earlier this morning attempted albuterol rescue inhaler without relief.  She called EMS.  EMS provided 1 DuoNeb treatment in route without relief.  They provided 125 mg of Solu-Medrol without relief.  Further information is limited by patient presentation.    Review of patient's allergies indicates:  No Known Allergies    No current facility-administered medications on file prior to encounter.     Current Outpatient Medications on File Prior to Encounter   Medication Sig Dispense Refill    albuterol (PROVENTIL) 2.5 mg /3 mL (0.083 %) nebulizer solution Take 3 mLs (2.5 mg total) by nebulization every 6 (six) hours as needed for Wheezing or Shortness of Breath. Rescue 90 mL 3    albuterol (PROVENTIL/VENTOLIN HFA) 90 mcg/actuation inhaler Inhale 1-2 puffs into the lungs every 4 (four) hours as needed for Wheezing or Shortness of Breath. Rescue 18 g 11    fluticasone-salmeterol 500-50 mcg/dose (WIXELA INHUB) 500-50 mcg/dose DsDv diskus inhaler Inhale 1 puff into the lungs 2 (two) times daily. Controller 60 each 11    fluticasone-salmeterol diskus inhaler 250-50 mcg Inhale 1 puff into the lungs 2 (two) times daily. Controller 180 each 11    VIOS AEROSOL DELIVERY SYSTEM Fanny USE AS DIRECTED WITH SOLUTION         PMH:  As per HPI and below:  Past Medical History:   Diagnosis Date    Anemia     Asthma     Chest tightness 04/05/2024    Menorrhagia     Metabolic acidosis 04/04/2024     History reviewed. No pertinent surgical history.    Social History     Socioeconomic History    Marital status: Single   Tobacco Use    Smoking status:  Former     Types: Cigars     Start date: 2019     Quit date: 2022     Years since quittin.9     Passive exposure: Current    Smokeless tobacco: Never   Substance and Sexual Activity    Alcohol use: Yes     Comment: special occasions    Drug use: Not Currently     Comment: marijuana in past    Sexual activity: Yes     Partners: Male     Birth control/protection: None   Social History Narrative    Lives alone, security work at hotel.     Social Drivers of Health     Financial Resource Strain: Low Risk  (2024)    Overall Financial Resource Strain (CARDIA)     Difficulty of Paying Living Expenses: Not very hard   Food Insecurity: No Food Insecurity (2024)    Hunger Vital Sign     Worried About Running Out of Food in the Last Year: Never true     Ran Out of Food in the Last Year: Never true   Transportation Needs: No Transportation Needs (2024)    PRAPARE - Transportation     Lack of Transportation (Medical): No     Lack of Transportation (Non-Medical): No   Physical Activity: Inactive (2022)    Exercise Vital Sign     Days of Exercise per Week: 0 days     Minutes of Exercise per Session: 0 min   Stress: No Stress Concern Present (2024)    [employer] - Occupational Stress Questionnaire     Feeling of Stress : Only a little   Housing Stability: Low Risk  (2024)    Housing Stability Vital Sign     Unable to Pay for Housing in the Last Year: No     Number of Places Lived in the Last Year: 1     Unstable Housing in the Last Year: No       Family History   Problem Relation Name Age of Onset    Asthma Father      Asthma Sister      Asthma Brother      Asthma Paternal Aunt      Asthma Paternal Uncle      Breast cancer Neg Hx      Colon cancer Neg Hx      Ovarian cancer Neg Hx         Physical Exam:      Vitals:    24 0446   BP:    Pulse: (!) 118   Resp: (!) 24   Temp:      Physical Exam  Gen:  Patient's in respiratory distress secondary to asthma  attack.  Mental Status:  Alert and oriented.    Skin: Warm, dry. No rashes seen.  Eyes: No conjunctival injection.  Pulm:  Significant wheezing heard bilateral lungs.  Significant wheezing seems to be originating from upper airway including the neck.  Significantly increased work of breathing.  No significant tachypnea.  1-2 word dyspnea.    CV:  Tachycardic with a rate of 112  Abd: Soft.  Not distended.  Nontender.   MSK: No deformities.    Neuro: Awake. Speech normal. No focal neuro deficit observed.      Procedures  Laboratory Studies:  Labs Reviewed   CBC W/ AUTO DIFFERENTIAL - Abnormal       Result Value    WBC 12.63      RBC 4.72      Hemoglobin 11.1 (*)     Hematocrit 37.5      MCV 79 (*)     MCH 23.5 (*)     MCHC 29.6 (*)     RDW 17.4 (*)     Platelets 315      MPV 12.8      Immature Granulocytes 0.3      Gran # (ANC) 7.8 (*)     Immature Grans (Abs) 0.04      Lymph # 2.8      Mono # 0.9      Eos # 0.9 (*)     Baso # 0.12      nRBC 0      Gran % 61.9      Lymph % 22.4      Mono % 7.2      Eosinophil % 7.2      Basophil % 1.0      Differential Method Automated     D DIMER, QUANTITATIVE - Abnormal    D-Dimer 0.57 (*)    ISTAT PROCEDURE - Abnormal    POC PH 7.251 (*)     POC PCO2 64.0 (*)     POC PO2 56      POC HCO3 28.1 (*)     POC BE 1      POC SATURATED O2 82      POC TCO2 30 (*)     Sample VENOUS      Site Other      Allens Test N/A     ISTAT PROCEDURE - Abnormal    POC PH 7.136 (*)     POC PCO2 83.2 (*)     POC PO2 44      POC HCO3 28.1 (*)     POC BE -1      POC SATURATED O2 63      POC TCO2 31 (*)     Sample VENOUS      Site Other      Allens Test N/A     COMPREHENSIVE METABOLIC PANEL   TROPONIN I HIGH SENSITIVITY   B-TYPE NATRIURETIC PEPTIDE   POCT URINE PREGNANCY       EKG (independently interpreted by me):  Sinus tachycardia.  Normal axis.  Normal intervals.  No STEMI    X-rays (independently interpreted by me):  No consolidation or edema apparent on chest x-ray    Chart reviewed.  Patient has a  history of congestive heart failure.    Imaging Results              X-Ray Chest AP Portable (Final result)  Result time 12/17/24 05:45:57      Final result by Carlos Avila MD (12/17/24 05:45:57)                   Impression:      No significant intrathoracic abnormality.  No significant detrimental interval change in the appearance of the chest since 11/29/2024 at 16:26 is appreciated.      Electronically signed by: Carlos Avila MD  Date:    12/17/2024  Time:    05:45               Narrative:    EXAMINATION:  XR CHEST AP PORTABLE    CLINICAL HISTORY:  sob;    TECHNIQUE:  One view    COMPARISON:  Comparison is made to 11/29/2024 at 16:26.    FINDINGS:  Heart size is normal, as is the appearance of the pulmonary vascularity.  Lung zones are clear, and are free of significant airspace consolidation or volume loss.  No pleural fluid.  No abnormal mediastinal widening.  No pneumothorax or pneumomediastinum.                                      Medications Given:  Medications   albuterol nebulizer solution (5 mg/hr Nebulization New Bag 12/17/24 0446)   magnesium sulfate 2g in water 50mL IVPB (premix) (0 g Intravenous Stopped 12/17/24 0405)   racepinephrine 2.25 % nebulizer solution 0.5 mL (0.5 mLs Nebulization Given 12/17/24 0340)   albuterol-ipratropium 2.5 mg-0.5 mg/3 mL nebulizer solution 3 mL (3 mLs Nebulization Given 12/17/24 0355)   albuterol sulfate nebulizer solution 2.5 mg (2.5 mg Nebulization Given 12/17/24 0355)   ondansetron injection 4 mg (4 mg Intravenous Given 12/17/24 0356)       Discussed with:  Medicine.  They will admit the patient to their service further management.    MDM:    34 y.o. female with asthma exacerbation    Differential includes but isn't limited to acute asthma exacerbation, lung infection, upper respiratory infection    Chest x-ray shows no indication of consolidation.  Patient asthma was refractory to numerous treatment modalities including magnesium, racemic epinephrine, DuoNeb with  the additional albuterol treatments, in her blood gas continued to deteriorate.  Therefore, we utilize BiPAP to support her respiratory status.    Secondary to requiring BiPAP critical Care Medicine was consulted and have evaluated the patient.  They have agreed to accept the patient to their service for further management.      Medical Decision Making       Diagnostic Impression:    1. Asthma               Patient and/or family understands the plan and is in agreement, verbalized understanding, questions answered    V Anderson Fair MD  Resident  Emergency Medicine         Barney Fair MD  Resident  12/17/24 2912      Patient is a 34-year-old female past medical history asthma that is presenting for typical asthma exacerbation.  Patient states that she was at baseline health yesterday.  Today patient states that she has had typical asthma exacerbation throughout the day, short of breath and wheezing, gradually worsening despite use of rescue inhaler.  Patient then called 911.  EMS reports that they gave her 2 breathing treatments, 125 mg Solu-Medrol and patient had improvement in movement of air however continued to have inspiratory and expiratory wheeze on arrival.  Patient denies any fevers, chills, chest pains, cough, nausea, vomiting, diarrhea, abdominal pain.  Patient states that this is typical of her asthma exacerbations.    Physical exam: Well-appearing 34-year-old female, no distress.  Patient mildly tachypneic with inspiratory and expiratory wheeze.  However patient is able to speak in full sentences.  No obvious respiratory distress.  Benign cardiac, abdominal exam otherwise.    Plan:  We will treat for asthma exacerbation.  If not resolved, we will consider hospitalization otherwise we will discharge with course of steroids.    Attestation of Dr. Vizcaino (Attending Physician):      I have reviewed the record and the patient care provided by the Resident provider and agree with the documented HPI, ROS, PE.   I also agree with the treatment plan and work up and I have performed an independent history / physical exam and MDM.     Gerson Vizcaino MD  12/17/24 0602

## 2024-12-17 NOTE — H&P
[facility] Emergency Dept  Critical Care Medicine  History & Physical    Patient Name: Molly Aly  MRN: [id number]  Admission Date: 12/17/2024  Hospital Length of Stay: 0 days  Code Status: Full Code  Attending Physician: Gerson Vizcaino MD   Primary Care Provider: Best Light MD   Principal Problem: Moderate persistent asthma without complication  Inpatient consult to Critical Care Medicine  Consult performed by: Jai Pritchett MD  Consult ordered by: Barney Fair MD         Subjective:     HPI:  Molly Aly is a 33yo F, PMH asthma, microcytic anemia, initially presenting to ED for SOB; consulted to MICU for asthma exacerbation requiring bipap.    Endorses symptoms beginning insidiously in the afternoon, including shortness of breath and wheezing. Took 1x albuterol inhaler without resolution, called EMS. On EMS arrival, pt wheezing diffusely with O2 sat 87%; given 125 solumedrol and 1x duoneb en route to hospital. Upon arrival, patient still short of breath and wheezing; placed on bipap 10/5 with increasing ease of breathing. During interview, patient able to answer questions in full sentences while on bipap 12/8. States she was previously on fluticasone but stopped approx 1mo ago because insurance no longer covers fluticasone. Denies any fever/chills, conjunctivitis, coughing/phlegm, gerd, CP/palpitations, ataxia, leg swelling. PMH as stated above, currently only on albuterol. No significant substance use, no known sick contacts.    Hospital/ICU Course:  No notes on file     Past Medical History:   Diagnosis Date    Anemia     Asthma     Chest tightness 04/05/2024    Menorrhagia     Metabolic acidosis 04/04/2024       History reviewed. No pertinent surgical history.    Review of patient's allergies indicates:  No Known Allergies    Family History       Problem Relation (Age of Onset)    Asthma Father, Sister, Brother, Paternal Aunt, Paternal Uncle          Tobacco Use    Smoking status: Former      Types: Cigars     Start date: 2019     Quit date: 2022     Years since quittin.9     Passive exposure: Current    Smokeless tobacco: Never   Substance and Sexual Activity    Alcohol use: Yes     Comment: special occasions    Drug use: Not Currently     Comment: marijuana in past    Sexual activity: Yes     Partners: Male     Birth control/protection: None      Review of Systems  Negative except as stated above  Objective:     Vital Signs (Most Recent):  Temp: 97.6 °F (36.4 °C) (24 0331)  Pulse: (!) 118 (24 0446)  Resp: (!) 24 (24 044)  BP: (!) 151/102 (24 0430)  SpO2: 100 % (24 044) Vital Signs (24h Range):  Temp:  [97.6 °F (36.4 °C)] 97.6 °F (36.4 °C)  Pulse:  [107-120] 118  Resp:  [20-30] 24  SpO2:  [98 %-100 %] 100 %  BP: (151-198)/() 151/102   Weight: 106.1 kg (233 lb 14.5 oz)  Body mass index is 41.43 kg/m².      Intake/Output Summary (Last 24 hours) at 2024 0559  Last data filed at 2024 0405  Gross per 24 hour   Intake 50 ml   Output --   Net 50 ml          Physical Exam  Vitals and nursing note reviewed. Exam conducted with a chaperone present.   Constitutional:       General: She is not in acute distress.     Comments: A&O x4, GCS 15, NAD, speaking in complete sentences.   HENT:      Head: Normocephalic and atraumatic.   Cardiovascular:      Rate and Rhythm: Normal rate and regular rhythm.      Pulses: Normal pulses.      Heart sounds: No murmur heard.     No friction rub. No gallop.   Pulmonary:      Effort: Pulmonary effort is normal. No respiratory distress.      Breath sounds: Wheezing present. No rhonchi or rales.      Comments: Diffuse expiratory wheezes  Abdominal:      General: Abdomen is flat. There is no distension.      Tenderness: There is no abdominal tenderness. There is no guarding.   Musculoskeletal:         General: No deformity or signs of injury.      Right lower leg: No edema.      Left lower leg: No edema.   Skin:     General:  "Skin is warm.      Capillary Refill: Capillary refill takes less than 2 seconds.      Findings: No lesion or rash.   Neurological:      Mental Status: She is oriented to person, place, and time.   Psychiatric:         Mood and Affect: Mood normal.         Behavior: Behavior normal.            Vents:  Oxygen Concentration (%): 21 (12/17/24 0446)  Lines/Drains/Airways       Peripheral Intravenous Line  Duration                  Peripheral IV - Single Lumen 12/17/24 20 G Left Antecubital <1 day                  Significant Labs:    CBC/Anemia Profile:  Recent Labs   Lab 12/17/24  0506   WBC 12.63   HGB 11.1*   HCT 37.5      MCV 79*   RDW 17.4*        Chemistries:  No results for input(s): "NA", "K", "CL", "CO2", "BUN", "CREATININE", "CALCIUM", "ALBUMIN", "PROT", "BILITOT", "ALKPHOS", "ALT", "AST", "GLUCOSE", "MG", "PHOS" in the last 48 hours.    All pertinent labs within the past 24 hours have been reviewed.    Significant Imaging: I have reviewed all pertinent imaging results/findings within the past 24 hours.  Assessment/Plan:     Pulmonary  Moderate persistent asthma without complication  -on initial exam, speaking in full sentences, no retractions or nasal flaring, RR mid 20s, satting 98%, mild/moderate expiratory wheezes diffusely  -Initial ABG 7.25/64/56, progressed to 7.13/83/44 by time of admit  -Initial CXR WNL  -Duoneb q4   -serial blood gas  -solumedrol 60 BID  -Continue bipap 12/8    Oncology  Microcytic anemia  -per chart review, previous documentation of heavy menstrual cycles and currently prescribed Fe supplement  -Fe studies during prior admits consistent with Fe deficit anemia  -initial H&H 11.1/37.5; no lightheadedness/vision changes/HA/ other Sx associated with Sx anemia  -CBC qd while admitted, will continue to monitor        Critical secondary to Patient has a condition that poses threat to life and bodily function: Severe Respiratory Distress    Critical care was time spent personally by " me on the following activities: development of treatment plan with patient or surrogate and bedside caregivers, discussions with consultants, evaluation of patient's response to treatment, examination of patient, ordering and performing treatments and interventions, ordering and review of laboratory studies, ordering and review of radiographic studies, pulse oximetry, re-evaluation of patient's condition. This critical care time did not overlap with that of any other provider or involve time for any procedures.     Jai Pritchett MD  Critical Care Medicine  [facility] - Emergency Dept

## 2024-12-17 NOTE — ED TRIAGE NOTES
Molly Aly, a 34 y.o. female presents to the ED w/ complaint of asthma exacerbation. SOB started last night. Received 125 of Solumedrol, duoneb, and albuterol with EMS. Expiratory wheezing noted. RA sat was 87%. Upon arrival to ED, attending, resident, and RT at bedside.     Triage note:  Chief Complaint   Patient presents with    asthma exacerbation     SOB starting last night. Received 125 solumedrol, duoneb, and albuterol. +Wheeze, RA sat 87%.      Review of patient's allergies indicates:  No Known Allergies  Past Medical History:   Diagnosis Date    Anemia     Asthma     Chest tightness 04/05/2024    Menorrhagia     Metabolic acidosis 04/04/2024

## 2024-12-17 NOTE — CONSULTS
.Patient seen and examined, admission order placed to ICU for worsening acute hypercapnic respiratory failure due to asthma exacerbation.    Full H and P to follow.        Juan Cheung MD  Pulmonary/Critical Care

## 2024-12-17 NOTE — HPI
Molly Aly is a 33yo F, PMH asthma, microcytic anemia, initially presenting to ED for SOB; consulted to MICU for asthma exacerbation requiring bipap.    Endorses symptoms beginning insidiously in the afternoon, including shortness of breath and wheezing. Took 1x albuterol inhaler without resolution, called EMS. On EMS arrival, pt wheezing diffusely with O2 sat 87%; given 125 solumedrol and 1x duoneb en route to hospital. Upon arrival, patient still short of breath and wheezing; placed on bipap 10/5 with increasing ease of breathing. During interview, patient able to answer questions in full sentences while on bipap 12/8. States she was previously on fluticasone but stopped approx 1mo ago because insurance no longer covers fluticasone. Denies any fever/chills, conjunctivitis, coughing/phlegm, gerd, CP/palpitations, ataxia, leg swelling. PMH as stated above, currently only on albuterol. No significant substance use, no known sick contacts.

## 2024-12-17 NOTE — ASSESSMENT & PLAN NOTE
-per chart review, previous documentation of heavy menstrual cycles and currently prescribed Fe supplement  -Fe studies during prior admits consistent with Fe deficit anemia  -initial H&H 11.1/37.5; no lightheadedness/vision changes/HA/ other Sx associated with Sx anemia  -CBC qd while admitted, will continue to monitor

## 2024-12-17 NOTE — PLAN OF CARE
MICU DAILY GOALS     Family/Goals of care/Code Status   Code Status: Full Code    24H Vital Sign Range  Temp:  [97.6 °F (36.4 °C)-98.5 °F (36.9 °C)]   Pulse:  [100-122]   Resp:  [17-30]   BP: (131-198)/()   SpO2:  [97 %-100 %]      Shift Events (include procedures and significant events)   Pt weaned off of continuous bipap to room air, sats >95%. Expiratory wheeze noted, scheduled and ordered fluticasone and albuterol given. Sinus tachy per monitor, MD notified. Placed on regular diet. No other acute shift events.      AWAKE RASS: Goal -    Actual - RASS (Nash Agitation-Sedation Scale): alert and calm    Restraint necessity: Not necessary   BREATHE SBT: Not intubated    Coordinate A & B, analgesics/sedatives Pain: managed   SAT: Not intubated   Delirium CAM-ICU:     Early(intubated/ Progressive (non-intubated) Mobility MOVE Screen (INTUBATED ONLY): Not intubated    Activity: Activity Management: Ambulated to bathroom - L4   Feeding/Nutrition Diet order: Diet/Nutrition Received: regular,     Thrombus DVT prophylaxis: VTE Core Measure: Pharmacological prophylaxis initiated/maintained   HOB Elevation Head of Bed (HOB) Positioning: HOB at 30-45 degrees   Ulcer Prophylaxis GI: yes   Glucose control managed     Skin Skin assessment:     Sacrum intact/not altered? Yes  Heels intact/not altered? Yes  Surgical wound? No    CHECK ONE!   (no altered skin or altered skin) and sub boxes:  [] No Altered Skin Integrity Present    []Prevention Measures Documented    [x] Altered Skin Integrity Present or Discovered   [x] LDA present in EPIC, daily doc completed              [] LDA added if not in EPIC (describe wound).                    When describing wound, do not stage, use descriptive words only.    [] Wound Image Taken (required on admit,                   transfer/discharge and every Tuesday)    Wound Care Consulted? No   Bowel Function no issues    Indwelling Catheter Necessity            De-escalation Antibiotics  N/A        VS and assessment per flow sheet, patient progressing towards goals as tolerated, plan of care reviewed with  Molly Aly , all concerns addressed, will continue to monitor.\

## 2024-12-17 NOTE — SUBJECTIVE & OBJECTIVE
Past Medical History:   Diagnosis Date    Anemia     Asthma     Chest tightness 2024    Menorrhagia     Metabolic acidosis 2024       History reviewed. No pertinent surgical history.    Review of patient's allergies indicates:  No Known Allergies    Family History       Problem Relation (Age of Onset)    Asthma Father, Sister, Brother, Paternal Aunt, Paternal Uncle          Tobacco Use    Smoking status: Former     Types: Cigars     Start date: 2019     Quit date: 2022     Years since quittin.9     Passive exposure: Current    Smokeless tobacco: Never   Substance and Sexual Activity    Alcohol use: Yes     Comment: special occasions    Drug use: Not Currently     Comment: marijuana in past    Sexual activity: Yes     Partners: Male     Birth control/protection: None      Review of Systems  Negative except as stated above  Objective:     Vital Signs (Most Recent):  Temp: 97.6 °F (36.4 °C) (24 0331)  Pulse: (!) 118 (24 0446)  Resp: (!) 24 (24 0446)  BP: (!) 151/102 (24 0430)  SpO2: 100 % (24 0446) Vital Signs (24h Range):  Temp:  [97.6 °F (36.4 °C)] 97.6 °F (36.4 °C)  Pulse:  [107-120] 118  Resp:  [20-30] 24  SpO2:  [98 %-100 %] 100 %  BP: (151-198)/() 151/102   Weight: 106.1 kg (233 lb 14.5 oz)  Body mass index is 41.43 kg/m².      Intake/Output Summary (Last 24 hours) at 2024 0559  Last data filed at 2024 0405  Gross per 24 hour   Intake 50 ml   Output --   Net 50 ml          Physical Exam  Vitals and nursing note reviewed. Exam conducted with a chaperone present.   Constitutional:       General: She is not in acute distress.     Comments: A&O x4, GCS 15, NAD, speaking in complete sentences.   HENT:      Head: Normocephalic and atraumatic.   Cardiovascular:      Rate and Rhythm: Normal rate and regular rhythm.      Pulses: Normal pulses.      Heart sounds: No murmur heard.     No friction rub. No gallop.   Pulmonary:      Effort: Pulmonary  "effort is normal. No respiratory distress.      Breath sounds: Wheezing present. No rhonchi or rales.      Comments: Diffuse expiratory wheezes  Abdominal:      General: Abdomen is flat. There is no distension.      Tenderness: There is no abdominal tenderness. There is no guarding.   Musculoskeletal:         General: No deformity or signs of injury.      Right lower leg: No edema.      Left lower leg: No edema.   Skin:     General: Skin is warm.      Capillary Refill: Capillary refill takes less than 2 seconds.      Findings: No lesion or rash.   Neurological:      Mental Status: She is oriented to person, place, and time.   Psychiatric:         Mood and Affect: Mood normal.         Behavior: Behavior normal.            Vents:  Oxygen Concentration (%): 21 (12/17/24 0446)  Lines/Drains/Airways       Peripheral Intravenous Line  Duration                  Peripheral IV - Single Lumen 12/17/24 20 G Left Antecubital <1 day                  Significant Labs:    CBC/Anemia Profile:  Recent Labs   Lab 12/17/24  0506   WBC 12.63   HGB 11.1*   HCT 37.5      MCV 79*   RDW 17.4*        Chemistries:  No results for input(s): "NA", "K", "CL", "CO2", "BUN", "CREATININE", "CALCIUM", "ALBUMIN", "PROT", "BILITOT", "ALKPHOS", "ALT", "AST", "GLUCOSE", "MG", "PHOS" in the last 48 hours.    All pertinent labs within the past 24 hours have been reviewed.    Significant Imaging: I have reviewed all pertinent imaging results/findings within the past 24 hours.  "

## 2024-12-17 NOTE — ASSESSMENT & PLAN NOTE
-on initial exam, speaking in full sentences, no retractions or nasal flaring, RR mid 20s, satting 98%, mild/moderate expiratory wheezes diffusely  -Initial ABG 7.25/64/56, progressed to 7.13/83/44 by time of admit  -Initial CXR WNL  -Duoneb q4   -serial blood gas  -solumedrol 60 BID  -Continue bipap 12/8

## 2024-12-18 LAB
ALBUMIN SERPL BCP-MCNC: 3.4 G/DL (ref 3.5–5.2)
ALP SERPL-CCNC: 59 U/L (ref 40–150)
ALT SERPL W/O P-5'-P-CCNC: 15 U/L (ref 10–44)
ANION GAP SERPL CALC-SCNC: 10 MMOL/L (ref 8–16)
AST SERPL-CCNC: 15 U/L (ref 10–40)
BASOPHILS # BLD AUTO: 0.02 K/UL (ref 0–0.2)
BASOPHILS NFR BLD: 0.1 % (ref 0–1.9)
BILIRUB SERPL-MCNC: 0.3 MG/DL (ref 0.1–1)
BUN SERPL-MCNC: 15 MG/DL (ref 6–20)
CALCIUM SERPL-MCNC: 8.9 MG/DL (ref 8.7–10.5)
CHLORIDE SERPL-SCNC: 108 MMOL/L (ref 95–110)
CO2 SERPL-SCNC: 19 MMOL/L (ref 23–29)
CREAT SERPL-MCNC: 0.8 MG/DL (ref 0.5–1.4)
DIFFERENTIAL METHOD BLD: ABNORMAL
EOSINOPHIL # BLD AUTO: 0 K/UL (ref 0–0.5)
EOSINOPHIL NFR BLD: 0 % (ref 0–8)
ERYTHROCYTE [DISTWIDTH] IN BLOOD BY AUTOMATED COUNT: 17.2 % (ref 11.5–14.5)
EST. GFR  (NO RACE VARIABLE): >60 ML/MIN/1.73 M^2
GLUCOSE SERPL-MCNC: 115 MG/DL (ref 70–110)
HCT VFR BLD AUTO: 32.2 % (ref 37–48.5)
HGB BLD-MCNC: 9.7 G/DL (ref 12–16)
IMM GRANULOCYTES # BLD AUTO: 0.12 K/UL (ref 0–0.04)
IMM GRANULOCYTES NFR BLD AUTO: 0.6 % (ref 0–0.5)
LYMPHOCYTES # BLD AUTO: 1.3 K/UL (ref 1–4.8)
LYMPHOCYTES NFR BLD: 7 % (ref 18–48)
MAGNESIUM SERPL-MCNC: 2.5 MG/DL (ref 1.6–2.6)
MCH RBC QN AUTO: 23.6 PG (ref 27–31)
MCHC RBC AUTO-ENTMCNC: 30.1 G/DL (ref 32–36)
MCV RBC AUTO: 78 FL (ref 82–98)
MONOCYTES # BLD AUTO: 0.8 K/UL (ref 0.3–1)
MONOCYTES NFR BLD: 4 % (ref 4–15)
NEUTROPHILS # BLD AUTO: 16.6 K/UL (ref 1.8–7.7)
NEUTROPHILS NFR BLD: 88.3 % (ref 38–73)
NRBC BLD-RTO: 0 /100 WBC
PHOSPHATE SERPL-MCNC: 4.4 MG/DL (ref 2.7–4.5)
PLATELET # BLD AUTO: 260 K/UL (ref 150–450)
PMV BLD AUTO: 10.9 FL (ref 9.2–12.9)
POCT GLUCOSE: 113 MG/DL (ref 70–110)
POCT GLUCOSE: 120 MG/DL (ref 70–110)
POCT GLUCOSE: 121 MG/DL (ref 70–110)
POTASSIUM SERPL-SCNC: 3.7 MMOL/L (ref 3.5–5.1)
PROT SERPL-MCNC: 7.7 G/DL (ref 6–8.4)
RBC # BLD AUTO: 4.11 M/UL (ref 4–5.4)
SODIUM SERPL-SCNC: 137 MMOL/L (ref 136–145)
WBC # BLD AUTO: 18.81 K/UL (ref 3.9–12.7)

## 2024-12-18 PROCEDURE — 25000242 PHARM REV CODE 250 ALT 637 W/ HCPCS: Performed by: STUDENT IN AN ORGANIZED HEALTH CARE EDUCATION/TRAINING PROGRAM

## 2024-12-18 PROCEDURE — 80053 COMPREHEN METABOLIC PANEL: CPT | Performed by: STUDENT IN AN ORGANIZED HEALTH CARE EDUCATION/TRAINING PROGRAM

## 2024-12-18 PROCEDURE — 85025 COMPLETE CBC W/AUTO DIFF WBC: CPT | Performed by: STUDENT IN AN ORGANIZED HEALTH CARE EDUCATION/TRAINING PROGRAM

## 2024-12-18 PROCEDURE — 99900035 HC TECH TIME PER 15 MIN (STAT)

## 2024-12-18 PROCEDURE — 99233 SBSQ HOSP IP/OBS HIGH 50: CPT | Mod: ,,, | Performed by: INTERNAL MEDICINE

## 2024-12-18 PROCEDURE — 63600175 PHARM REV CODE 636 W HCPCS: Performed by: INTERNAL MEDICINE

## 2024-12-18 PROCEDURE — 94761 N-INVAS EAR/PLS OXIMETRY MLT: CPT

## 2024-12-18 PROCEDURE — 63600175 PHARM REV CODE 636 W HCPCS: Performed by: STUDENT IN AN ORGANIZED HEALTH CARE EDUCATION/TRAINING PROGRAM

## 2024-12-18 PROCEDURE — 84100 ASSAY OF PHOSPHORUS: CPT | Performed by: STUDENT IN AN ORGANIZED HEALTH CARE EDUCATION/TRAINING PROGRAM

## 2024-12-18 PROCEDURE — 25000003 PHARM REV CODE 250: Performed by: STUDENT IN AN ORGANIZED HEALTH CARE EDUCATION/TRAINING PROGRAM

## 2024-12-18 PROCEDURE — 20600001 HC STEP DOWN PRIVATE ROOM

## 2024-12-18 PROCEDURE — 83735 ASSAY OF MAGNESIUM: CPT | Performed by: STUDENT IN AN ORGANIZED HEALTH CARE EDUCATION/TRAINING PROGRAM

## 2024-12-18 PROCEDURE — 94640 AIRWAY INHALATION TREATMENT: CPT

## 2024-12-18 RX ORDER — PREDNISONE 20 MG/1
40 TABLET ORAL DAILY
Status: DISCONTINUED | OUTPATIENT
Start: 2024-12-18 | End: 2024-12-19 | Stop reason: HOSPADM

## 2024-12-18 RX ADMIN — FLUTICASONE FUROATE AND VILANTEROL TRIFENATATE 1 PUFF: 200; 25 POWDER RESPIRATORY (INHALATION) at 07:12

## 2024-12-18 RX ADMIN — IPRATROPIUM BROMIDE AND ALBUTEROL SULFATE 3 ML: 2.5; .5 SOLUTION RESPIRATORY (INHALATION) at 07:12

## 2024-12-18 RX ADMIN — IPRATROPIUM BROMIDE AND ALBUTEROL SULFATE 3 ML: 2.5; .5 SOLUTION RESPIRATORY (INHALATION) at 03:12

## 2024-12-18 RX ADMIN — ENOXAPARIN SODIUM 40 MG: 40 INJECTION SUBCUTANEOUS at 09:12

## 2024-12-18 RX ADMIN — IPRATROPIUM BROMIDE AND ALBUTEROL SULFATE 3 ML: 2.5; .5 SOLUTION RESPIRATORY (INHALATION) at 11:12

## 2024-12-18 RX ADMIN — ACETAMINOPHEN 650 MG: 325 TABLET ORAL at 06:12

## 2024-12-18 RX ADMIN — MUPIROCIN: 20 OINTMENT TOPICAL at 09:12

## 2024-12-18 RX ADMIN — PREDNISONE 40 MG: 20 TABLET ORAL at 09:12

## 2024-12-18 RX ADMIN — METHYLPREDNISOLONE SODIUM SUCCINATE 60 MG: 40 INJECTION, POWDER, FOR SOLUTION INTRAMUSCULAR; INTRAVENOUS at 05:12

## 2024-12-18 NOTE — RESIDENT HANDOFF
Handoff     Primary Team: Networked reference to record [facility]  Room Number: 7078/7078 A     Patient Name: Molly Aly MRN: [id number]     Date of Birth: [id number] Allergies: Patient has no known allergies.     Age: 34 y.o. Admit Date: 12/17/2024     Sex: female  BMI: Body mass index is 41.43 kg/m².     Code Status: Full Code        Illness Level (current clinical status): Watcher - No    Reason for Admission: Moderate persistent asthma without complication    Brief HPI (pertinent PMH and diagnosis or differential diagnosis): Molly Aly is a 33yo F, PMH asthma, microcytic anemia, initially presenting to ED for SOB; consulted to MICU for asthma exacerbation requiring bipap.      Hospital Course (updated, brief assessment by system or problem, significant events): Admitted to MICU for asthma exacerbation requiring BiPAP. Patient given breathing duo/nebs, ICS/LABA, and IV steroids. Weaned off of BiPAP. Monitored for 24 hours off BiPAP, clinically stable for step-down to hospital medicine.     Tasks (specific, using if-then statements): Concerns of insurance and obtaining appropriate inhalers for treatment of asthma, she has been out for 1-2 months      Discharge Disposition: Home or Self Care    Mentored By: Dr. Garrett

## 2024-12-18 NOTE — SUBJECTIVE & OBJECTIVE
Interval History/Significant Events: NAEON, off BiPAP, continued breathing treatments and steroids, plans to SD to hospital medicine    Review of Systems  Objective:     Vital Signs (Most Recent):  Temp: 98.8 °F (37.1 °C) (12/18/24 0730)  Pulse: 109 (12/18/24 0756)  Resp: (!) 24 (12/18/24 0756)  BP: 138/84 (12/18/24 0730)  SpO2: 98 % (12/18/24 0756) Vital Signs (24h Range):  Temp:  [97.6 °F (36.4 °C)-98.8 °F (37.1 °C)] 98.8 °F (37.1 °C)  Pulse:  [] 109  Resp:  [15-34] 24  SpO2:  [95 %-100 %] 98 %  BP: (130-160)/(76-98) 138/84   Weight: 106.1 kg (233 lb 14.5 oz)  Body mass index is 41.43 kg/m².      Intake/Output Summary (Last 24 hours) at 12/18/2024 0826  Last data filed at 12/17/2024 2220  Gross per 24 hour   Intake 960 ml   Output 300 ml   Net 660 ml          Physical Exam  Vitals and nursing note reviewed. Exam conducted with a chaperone present.   Constitutional:       General: She is not in acute distress.     Comments: A&O x4, GCS 15, NAD, speaking in complete sentences.   HENT:      Head: Normocephalic and atraumatic.   Cardiovascular:      Rate and Rhythm: Normal rate and regular rhythm.      Pulses: Normal pulses.      Heart sounds: No murmur heard.     No friction rub. No gallop.   Pulmonary:      Effort: Pulmonary effort is normal. No respiratory distress.      Breath sounds: Wheezing present. No rhonchi or rales.      Comments: Diffuse expiratory wheezes  Abdominal:      General: Abdomen is flat. There is no distension.      Tenderness: There is no abdominal tenderness. There is no guarding.   Musculoskeletal:         General: No deformity or signs of injury.      Right lower leg: No edema.      Left lower leg: No edema.   Skin:     General: Skin is warm.      Capillary Refill: Capillary refill takes less than 2 seconds.      Findings: No lesion or rash.   Neurological:      Mental Status: She is oriented to person, place, and time.   Psychiatric:         Mood and Affect: Mood normal.          Behavior: Behavior normal.            Vents:  Oxygen Concentration (%): 21 (12/17/24 0801)  Lines/Drains/Airways       Peripheral Intravenous Line  Duration                  Peripheral IV - Single Lumen 12/17/24 0748 20 G Right Antecubital 1 day                  Significant Labs:    CBC/Anemia Profile:  Recent Labs   Lab 12/17/24  0506 12/18/24  0401   WBC 12.63 18.81*   HGB 11.1* 9.7*   HCT 37.5 32.2*    260   MCV 79* 78*   RDW 17.4* 17.2*        Chemistries:  Recent Labs   Lab 12/17/24  0506 12/18/24  0401    137   K 4.6 3.7    108   CO2 19* 19*   BUN 11 15   CREATININE 0.9 0.8   CALCIUM 8.8 8.9   ALBUMIN 4.1 3.4*   PROT 9.0* 7.7   BILITOT 0.4 0.3   ALKPHOS 63 59   ALT 16 15   AST 33 15   MG 2.2 2.5   PHOS 5.1* 4.4       All pertinent labs within the past 24 hours have been reviewed.    Significant Imaging:  I have reviewed all pertinent imaging results/findings within the past 24 hours.

## 2024-12-18 NOTE — PLAN OF CARE
Hospital Medicine ICU Acceptance Note    Date of Admit: 12/17/2024  Date of Transfer / Stepdown: 12/18/2024  Boannabelle, C/J, L, Onc (IV chemo w/in 1 month), Gyn/Onc, or other special case?: No  ICU team stepping patient down: MICU/CCU/SICU/NCC  ICU team member giving handoff: Con Chaudhry    Accepting  team: Q    History of Present Illness:     Molly Aly is a 35yo F, PMH asthma, microcytic anemia, initially presenting to ED for SOB; consulted to MICU for asthma exacerbation requiring bipap.     Endorses symptoms beginning insidiously in the afternoon, including shortness of breath and wheezing. Took 1x albuterol inhaler without resolution, called EMS. On EMS arrival, pt wheezing diffusely with O2 sat 87%; given 125 solumedrol and 1x duoneb en route to hospital. Upon arrival, patient still short of breath and wheezing; placed on bipap 10/5 with increasing ease of breathing. During interview, patient able to answer questions in full sentences while on bipap 12/8. States she was previously on fluticasone but stopped approx 1mo ago because insurance no longer covers fluticasone. Denies any fever/chills, conjunctivitis, coughing/phlegm, gerd, CP/palpitations, ataxia, leg swelling. PMH as stated above, currently only on albuterol. No significant substance use, no known sick contacts.    Hospital/ICU Course:     Admitted to MICU for asthma exacerbation requiring BiPAP. Patient given breathing duo/nebs, ICS/LABA, and IV steroids. Weaned off of BiPAP. Monitored for 24 hours off BiPAP, clinically stable for step-down to hospital medicine.     Deemed appropriate for transfer to the floor on 12/18/2024, and was accepted to  team Q for further care and management.    Consultants and Procedures:     Consultants:  Consults (From admission, onward)          Status Ordering Provider     Inpatient consult to Critical Care Medicine  Once        Provider:  (Not yet assigned)    Completed PIETER BRAUN            Procedures:     As documented    Transfer Information:     Diet:  As ordered    Physical Activity:  As tolerated      Pending plan at time of transfer to the floor:  Continue current plan initiated by ICU, will further monitor and adjust as clinically indicated upon arrival to the floor.    Patient has been accepted by Hospital Medicine Team G, who will assume care of the patient upon arrival to the floor from the ICU. Please contact ICU team with any concerns prior to transfer to the floor.      Elinor Wilkes MD  Attending Physician  Department of Hospital Medicine  12/18/2024

## 2024-12-18 NOTE — PLAN OF CARE
MICU DAILY GOALS     Family/Goals of care/Code Status   Code Status: Full Code    24H Vital Sign Range  Temp:  [97.6 °F (36.4 °C)-98.5 °F (36.9 °C)]   Pulse:  []   Resp:  [15-34]   BP: (130-169)/(76-98)   SpO2:  [95 %-100 %]      Shift Events (include procedures and significant events)   No acute events throughout shift    AWAKE RASS: Goal -    Actual - RASS (Nash Agitation-Sedation Scale): alert and calm    Restraint necessity: Not necessary   BREATHE SBT: Not intubated    Coordinate A & B, analgesics/sedatives Pain: managed   SAT: Not intubated   Delirium CAM-ICU:     Early(intubated/ Progressive (non-intubated) Mobility MOVE Screen (INTUBATED ONLY): Not intubated    Activity: Activity Management: Ambulated to bathroom - L4   Feeding/Nutrition Diet order: Diet/Nutrition Received: regular,     Thrombus DVT prophylaxis: VTE Core Measure: Pharmacological prophylaxis initiated/maintained   HOB Elevation Head of Bed (HOB) Positioning: HOB at 30-45 degrees   Ulcer Prophylaxis GI: yes   Glucose control managed     Skin Skin assessment:     Sacrum intact/not altered? Yes  Heels intact/not altered? Yes  Surgical wound? No    CHECK ONE!   (no altered skin or altered skin) and sub boxes:  [x] No Altered Skin Integrity Present    []Prevention Measures Documented    [] Altered Skin Integrity Present or Discovered   [] LDA present in EPIC, daily doc completed              [] LDA added if not in EPIC (describe wound).                    When describing wound, do not stage, use descriptive words only.    [] Wound Image Taken (required on admit,                   transfer/discharge and every Tuesday)    Wound Care Consulted? No   Bowel Function no issues    Indwelling Catheter Necessity            De-escalation Antibiotics No        VS and assessment per flow sheet, patient progressing towards goals as tolerated, plan of care reviewed with patient, all concerns addressed, will continue to monitor.

## 2024-12-18 NOTE — HOSPITAL COURSE
Admitted to MICU for asthma exacerbation requiring BiPAP. Patient given breathing duo/nebs, ICS/LABA, and IV steroids. Weaned off of BiPAP.  Monitored for 24 hours off BiPAP, clinically stable for step-down to hospital medicine on 12/18. Symptoms continued to improve on 12/19, was stable for discharge. Discharged with remainder of 5 day prednisone course.

## 2024-12-18 NOTE — PLAN OF CARE
[facility] - Medical ICU  Initial Discharge Assessment       Primary Care Provider: Best Light MD    Admission Diagnosis: Asthma [J45.909]    Admission Date: 12/17/2024  Expected Discharge Date: 12/21/2024    Transition of Care Barriers: None    Payor: [place] [facility] / Plan: BCBS ALL OUT OF STATE / Product Type: PPO /     Extended Emergency Contact Information  Primary Emergency Contact: Sera Aly  Mobile Phone: [phone]  Relation: Mother   needed? No    Discharge Plan A: Home  Discharge Plan B: Home      [id number] - [place] - [address]  AT [facility] [address] [facility] [address]  [address] [place] [address]  Phone: [phone] Fax: [phone]    [facility] #[id number] - [place] - [address] [address] AT [facility] [address]  [facility] [place] [address]  Phone: [phone] Fax: [phone]      Initial Assessment (most recent)       Adult Discharge Assessment - 12/18/24 1132          Discharge Assessment    Assessment Type Discharge Planning Assessment     Confirmed/corrected address, phone number and insurance Yes     Confirmed Demographics Correct on Facesheet     Source of Information patient     When was your last doctors appointment? 05/02/24     Does patient/caregiver understand observation status Yes     Communicated GILLIAN with patient/caregiver Yes     Reason For Admission Moderate Persistent asthma without complication     People in Home alone     Do you expect to return to your current living situation? Yes     Do you have help at home or someone to help you manage your care at home? No     Prior to hospitilization cognitive status: Alert/Oriented     Current cognitive status: Alert/Oriented;No Deficits     Walking or Climbing Stairs Difficulty no     Dressing/Bathing Difficulty no     Home Layout Able to live on 1st floor     Equipment Currently Used at Home none     Readmission within 30 days? No      Patient currently being followed by outpatient case management? No     Do you currently have service(s) that help you manage your care at home? No     Do you take prescription medications? Yes     Do you have prescription coverage? Yes     Coverage BC BS BCBS All out of State     Do you have any problems affording any of your prescribed medications? No     Is the patient taking medications as prescribed? yes     Who is going to help you get home at discharge? Uber/Lyft     How do you get to doctors appointments? car, drives self     Are you on dialysis? No     Do you take coumadin? No     Discharge Plan A Home     Discharge Plan B Home     DME Needed Upon Discharge  other (see comments)   TBD    Discharge Plan discussed with: Patient     Transition of Care Barriers None        Physical Activity    On average, how many days per week do you engage in moderate to strenuous exercise (like a brisk walk)? 0 days     On average, how many minutes do you engage in exercise at this level? 0 min        Financial Resource Strain    How hard is it for you to pay for the very basics like food, housing, medical care, and heating? Not very hard        Housing Stability    In the last 12 months, was there a time when you were not able to pay the mortgage or rent on time? No     At any time in the past 12 months, were you homeless or living in a shelter (including now)? No        Transportation Needs    Has the lack of transportation kept you from medical appointments, meetings, work or from getting things needed for daily living? No        Food Insecurity    Within the past 12 months, you worried that your food would run out before you got the money to buy more. Never true     Within the past 12 months, the food you bought just didn't last and you didn't have money to get more. Never true        Stress    Do you feel stress - tense, restless, nervous, or anxious, or unable to sleep at night because your mind is troubled all the time  - these days? Only a little        Social Isolation    How often do you feel lonely or isolated from those around you?  Never        Alcohol Use    Q1: How often do you have a drink containing alcohol? Monthly or less     Q2: How many drinks containing alcohol do you have on a typical day when you are drinking? 1 or 2     Q3: How often do you have six or more drinks on one occasion? Never        Utilities    In the past 12 months has the electric, gas, oil, or water company threatened to shut off services in your home? No        Health Literacy    How often do you need to have someone help you when you read instructions, pamphlets, or other written material from your doctor or pharmacy? Never                   SW met with patient at the bedside to complete DPA.  Patient AAO x's 4 and able to verify demographics on face sheet are correct. SW name and contact number listed on the patient's white board.  SW provided explanation of discharge plan process. Patient stated that she will need a lyft ride home.  Patient expressed understanding. CM team remains available for any further patient needs or concerns.    Patient discharged home with no needs.  Family available for transportation.  Information given to him per medical staff for f/u and symptoms to notify provider.      Ethan Nassar LMSW  [facility]  [phone]

## 2024-12-18 NOTE — PROGRESS NOTES
[facility]  Critical Care Medicine  Progress Note    Patient Name: Molly Aly  MRN: [id number]  Admission Date: 12/17/2024  Hospital Length of Stay: 1 days  Code Status: Full Code  Attending Provider: Ben Garrett MD  Primary Care Provider: Best Light MD   Principal Problem: Moderate persistent asthma without complication    Subjective:     HPI:  Molly Aly is a 35yo F, PMH asthma, microcytic anemia, initially presenting to ED for SOB; consulted to MICU for asthma exacerbation requiring bipap.    Endorses symptoms beginning insidiously in the afternoon, including shortness of breath and wheezing. Took 1x albuterol inhaler without resolution, called EMS. On EMS arrival, pt wheezing diffusely with O2 sat 87%; given 125 solumedrol and 1x duoneb en route to hospital. Upon arrival, patient still short of breath and wheezing; placed on bipap 10/5 with increasing ease of breathing. During interview, patient able to answer questions in full sentences while on bipap 12/8. States she was previously on fluticasone but stopped approx 1mo ago because insurance no longer covers fluticasone. Denies any fever/chills, conjunctivitis, coughing/phlegm, gerd, CP/palpitations, ataxia, leg swelling. PMH as stated above, currently only on albuterol. No significant substance use, no known sick contacts.    Hospital/ICU Course:  Admitted to MICU for asthma exacerbation requiring BiPAP. Patient given breathing duo/nebs, ICS/LABA, and IV steroids. Weaned off of BiPAP.  Monitored for 24 hours off BiPAP, clinically stable for step-down to hospital medicine.     Interval History/Significant Events: NAEON, off BiPAP, continued breathing treatments and steroids, plans to SD to hospital medicine    Review of Systems  Objective:     Vital Signs (Most Recent):  Temp: 98.8 °F (37.1 °C) (12/18/24 0730)  Pulse: 109 (12/18/24 0756)  Resp: (!) 24 (12/18/24 0756)  BP: 138/84 (12/18/24 0730)  SpO2: 98 % (12/18/24 0756) Vital Signs  (24h Range):  Temp:  [97.6 °F (36.4 °C)-98.8 °F (37.1 °C)] 98.8 °F (37.1 °C)  Pulse:  [] 109  Resp:  [15-34] 24  SpO2:  [95 %-100 %] 98 %  BP: (130-160)/(76-98) 138/84   Weight: 106.1 kg (233 lb 14.5 oz)  Body mass index is 41.43 kg/m².      Intake/Output Summary (Last 24 hours) at 12/18/2024 0826  Last data filed at 12/17/2024 2220  Gross per 24 hour   Intake 960 ml   Output 300 ml   Net 660 ml          Physical Exam  Vitals and nursing note reviewed. Exam conducted with a chaperone present.   Constitutional:       General: She is not in acute distress.     Comments: A&O x4, GCS 15, NAD, speaking in complete sentences.   HENT:      Head: Normocephalic and atraumatic.   Cardiovascular:      Rate and Rhythm: Normal rate and regular rhythm.      Pulses: Normal pulses.      Heart sounds: No murmur heard.     No friction rub. No gallop.   Pulmonary:      Effort: Pulmonary effort is normal. No respiratory distress.      Breath sounds: Wheezing present. No rhonchi or rales.      Comments: Diffuse expiratory wheezes  Abdominal:      General: Abdomen is flat. There is no distension.      Tenderness: There is no abdominal tenderness. There is no guarding.   Musculoskeletal:         General: No deformity or signs of injury.      Right lower leg: No edema.      Left lower leg: No edema.   Skin:     General: Skin is warm.      Capillary Refill: Capillary refill takes less than 2 seconds.      Findings: No lesion or rash.   Neurological:      Mental Status: She is oriented to person, place, and time.   Psychiatric:         Mood and Affect: Mood normal.         Behavior: Behavior normal.            Vents:  Oxygen Concentration (%): 21 (12/17/24 0801)  Lines/Drains/Airways       Peripheral Intravenous Line  Duration                  Peripheral IV - Single Lumen 12/17/24 0748 20 G Right Antecubital 1 day                  Significant Labs:    CBC/Anemia Profile:  Recent Labs   Lab 12/17/24  0506 12/18/24  0401   WBC 12.63  18.81*   HGB 11.1* 9.7*   HCT 37.5 32.2*    260   MCV 79* 78*   RDW 17.4* 17.2*        Chemistries:  Recent Labs   Lab 12/17/24  0506 12/18/24  0401    137   K 4.6 3.7    108   CO2 19* 19*   BUN 11 15   CREATININE 0.9 0.8   CALCIUM 8.8 8.9   ALBUMIN 4.1 3.4*   PROT 9.0* 7.7   BILITOT 0.4 0.3   ALKPHOS 63 59   ALT 16 15   AST 33 15   MG 2.2 2.5   PHOS 5.1* 4.4       All pertinent labs within the past 24 hours have been reviewed.    Significant Imaging:  I have reviewed all pertinent imaging results/findings within the past 24 hours.    ABG  Recent Labs   Lab 12/17/24  0612   PH 7.262*   PO2 29*   PCO2 59.9*   HCO3 27.0   BE 0     Assessment/Plan:     Pulmonary  * Moderate persistent asthma without complication  -on initial exam, speaking in full sentences, no retractions or nasal flaring, RR mid 20s, satting 98%, mild/moderate expiratory wheezes diffusely  -Initial ABG 7.25/64/56, progressed to 7.13/83/44 by time of admit  -Initial CXR WNL  -Duoneb q4   -solumedrol 60 BID  -Weaned off BiPAP  -Needs CM/SW and pharmacy to assist in affordable ICS/LABA for asthma treatment, pt unable to afford current regimen and has been out for two months    Oncology  Microcytic anemia  -per chart review, previous documentation of heavy menstrual cycles and currently prescribed Fe supplement  -Fe studies during prior admits consistent with Fe deficit anemia  -initial H&H 11.1/37.5; no lightheadedness/vision changes/HA/ other Sx associated with Sx anemia  -CBC qd while admitted, will continue to monitor         Critical Care Daily Checklist:    A: Awake: RASS Goal/Actual Goal:    Actual:     B: Spontaneous Breathing Trial Performed?     C: SAT & SBT Coordinated?                        D: Delirium: CAM-ICU     E: Early Mobility Performed? Yes   F: Feeding Goal:    Status:     Current Diet Order   Procedures    Diet Adult Regular      AS: Analgesia/Sedation Tylenol PRN   T: Thromboembolic Prophylaxis Lovenox   H: HOB >  300 Yes   U: Stress Ulcer Prophylaxis (if needed) none   G: Glucose Control Controlled   B: Bowel Function Stool Occurrence: 1   I: Indwelling Catheter (Lines & Sam) Necessity PIVs   D: De-escalation of Antimicrobials/Pharmacotherapies No abx    Plan for the day/ETD SD    Code Status:  Family/Goals of Care: Full Code         Critical secondary to Patient has a condition that poses threat to life and bodily function: Asthma exacerbation      Critical care was time spent personally by me on the following activities: development of treatment plan with patient or surrogate and bedside caregivers, discussions with consultants, evaluation of patient's response to treatment, examination of patient, ordering and performing treatments and interventions, ordering and review of laboratory studies, ordering and review of radiographic studies, pulse oximetry, re-evaluation of patient's condition. This critical care time did not overlap with that of any other provider or involve time for any procedures.     Con Chaudhry MD  Critical Care Medicine  [facility] - Medical ICU

## 2024-12-18 NOTE — ASSESSMENT & PLAN NOTE
-on initial exam, speaking in full sentences, no retractions or nasal flaring, RR mid 20s, satting 98%, mild/moderate expiratory wheezes diffusely  -Initial ABG 7.25/64/56, progressed to 7.13/83/44 by time of admit  -Initial CXR WNL  -Duoneb q4   -solumedrol 60 BID  -Weaned off BiPAP  -Needs CM/SW and pharmacy to assist in affordable ICS/LABA for asthma treatment, pt unable to afford current regimen and has been out for two months

## 2024-12-19 VITALS
OXYGEN SATURATION: 97 % | HEIGHT: 63 IN | HEART RATE: 124 BPM | RESPIRATION RATE: 26 BRPM | TEMPERATURE: 98 F | WEIGHT: 233.94 LBS | DIASTOLIC BLOOD PRESSURE: 74 MMHG | SYSTOLIC BLOOD PRESSURE: 134 MMHG | BODY MASS INDEX: 41.45 KG/M2

## 2024-12-19 LAB
ALBUMIN SERPL BCP-MCNC: 3.3 G/DL (ref 3.5–5.2)
ALP SERPL-CCNC: 52 U/L (ref 40–150)
ALT SERPL W/O P-5'-P-CCNC: 13 U/L (ref 10–44)
ANION GAP SERPL CALC-SCNC: 9 MMOL/L (ref 8–16)
AST SERPL-CCNC: 13 U/L (ref 10–40)
BASOPHILS # BLD AUTO: 0.02 K/UL (ref 0–0.2)
BASOPHILS NFR BLD: 0.2 % (ref 0–1.9)
BILIRUB SERPL-MCNC: 0.4 MG/DL (ref 0.1–1)
BUN SERPL-MCNC: 16 MG/DL (ref 6–20)
CALCIUM SERPL-MCNC: 8.7 MG/DL (ref 8.7–10.5)
CHLORIDE SERPL-SCNC: 107 MMOL/L (ref 95–110)
CO2 SERPL-SCNC: 23 MMOL/L (ref 23–29)
CREAT SERPL-MCNC: 0.8 MG/DL (ref 0.5–1.4)
DIFFERENTIAL METHOD BLD: ABNORMAL
EOSINOPHIL # BLD AUTO: 0 K/UL (ref 0–0.5)
EOSINOPHIL NFR BLD: 0.1 % (ref 0–8)
ERYTHROCYTE [DISTWIDTH] IN BLOOD BY AUTOMATED COUNT: 17.5 % (ref 11.5–14.5)
EST. GFR  (NO RACE VARIABLE): >60 ML/MIN/1.73 M^2
GLUCOSE SERPL-MCNC: 84 MG/DL (ref 70–110)
HCT VFR BLD AUTO: 31.5 % (ref 37–48.5)
HGB BLD-MCNC: 10 G/DL (ref 12–16)
IMM GRANULOCYTES # BLD AUTO: 0.07 K/UL (ref 0–0.04)
IMM GRANULOCYTES NFR BLD AUTO: 0.5 % (ref 0–0.5)
LYMPHOCYTES # BLD AUTO: 2.9 K/UL (ref 1–4.8)
LYMPHOCYTES NFR BLD: 22 % (ref 18–48)
MAGNESIUM SERPL-MCNC: 2.2 MG/DL (ref 1.6–2.6)
MCH RBC QN AUTO: 24.6 PG (ref 27–31)
MCHC RBC AUTO-ENTMCNC: 31.7 G/DL (ref 32–36)
MCV RBC AUTO: 78 FL (ref 82–98)
MONOCYTES # BLD AUTO: 0.8 K/UL (ref 0.3–1)
MONOCYTES NFR BLD: 6.2 % (ref 4–15)
NEUTROPHILS # BLD AUTO: 9.4 K/UL (ref 1.8–7.7)
NEUTROPHILS NFR BLD: 71 % (ref 38–73)
NRBC BLD-RTO: 0 /100 WBC
PHOSPHATE SERPL-MCNC: 3.7 MG/DL (ref 2.7–4.5)
PLATELET # BLD AUTO: 267 K/UL (ref 150–450)
PMV BLD AUTO: 10.5 FL (ref 9.2–12.9)
POCT GLUCOSE: 119 MG/DL (ref 70–110)
POTASSIUM SERPL-SCNC: 3.7 MMOL/L (ref 3.5–5.1)
PROT SERPL-MCNC: 7 G/DL (ref 6–8.4)
RBC # BLD AUTO: 4.06 M/UL (ref 4–5.4)
SODIUM SERPL-SCNC: 139 MMOL/L (ref 136–145)
WBC # BLD AUTO: 13.29 K/UL (ref 3.9–12.7)

## 2024-12-19 PROCEDURE — 85025 COMPLETE CBC W/AUTO DIFF WBC: CPT | Performed by: STUDENT IN AN ORGANIZED HEALTH CARE EDUCATION/TRAINING PROGRAM

## 2024-12-19 PROCEDURE — 99900035 HC TECH TIME PER 15 MIN (STAT)

## 2024-12-19 PROCEDURE — 94640 AIRWAY INHALATION TREATMENT: CPT

## 2024-12-19 PROCEDURE — 84100 ASSAY OF PHOSPHORUS: CPT | Performed by: STUDENT IN AN ORGANIZED HEALTH CARE EDUCATION/TRAINING PROGRAM

## 2024-12-19 PROCEDURE — 76937 US GUIDE VASCULAR ACCESS: CPT

## 2024-12-19 PROCEDURE — 80053 COMPREHEN METABOLIC PANEL: CPT | Performed by: STUDENT IN AN ORGANIZED HEALTH CARE EDUCATION/TRAINING PROGRAM

## 2024-12-19 PROCEDURE — 83735 ASSAY OF MAGNESIUM: CPT | Performed by: STUDENT IN AN ORGANIZED HEALTH CARE EDUCATION/TRAINING PROGRAM

## 2024-12-19 PROCEDURE — 94761 N-INVAS EAR/PLS OXIMETRY MLT: CPT

## 2024-12-19 PROCEDURE — 63600175 PHARM REV CODE 636 W HCPCS: Performed by: INTERNAL MEDICINE

## 2024-12-19 PROCEDURE — 99233 SBSQ HOSP IP/OBS HIGH 50: CPT | Mod: ,,, | Performed by: INTERNAL MEDICINE

## 2024-12-19 PROCEDURE — 25000242 PHARM REV CODE 250 ALT 637 W/ HCPCS: Performed by: STUDENT IN AN ORGANIZED HEALTH CARE EDUCATION/TRAINING PROGRAM

## 2024-12-19 PROCEDURE — 63600175 PHARM REV CODE 636 W HCPCS: Performed by: STUDENT IN AN ORGANIZED HEALTH CARE EDUCATION/TRAINING PROGRAM

## 2024-12-19 RX ORDER — ALBUTEROL SULFATE 90 UG/1
2 INHALANT RESPIRATORY (INHALATION) EVERY 6 HOURS PRN
Qty: 18 G | Refills: 1 | Status: SHIPPED | OUTPATIENT
Start: 2024-12-19 | End: 2025-02-17

## 2024-12-19 RX ORDER — FLUTICASONE FUROATE AND VILANTEROL 200; 25 UG/1; UG/1
1 POWDER RESPIRATORY (INHALATION) DAILY
Qty: 60 EACH | Refills: 1 | Status: SHIPPED | OUTPATIENT
Start: 2024-12-20 | End: 2025-02-18

## 2024-12-19 RX ORDER — PREDNISONE 20 MG/1
40 TABLET ORAL DAILY
Qty: 6 TABLET | Refills: 0 | Status: SHIPPED | OUTPATIENT
Start: 2024-12-20 | End: 2024-12-23

## 2024-12-19 RX ORDER — IPRATROPIUM BROMIDE AND ALBUTEROL SULFATE 2.5; .5 MG/3ML; MG/3ML
3 SOLUTION RESPIRATORY (INHALATION) EVERY 4 HOURS
Qty: 1620 ML | Refills: 0 | Status: SHIPPED | OUTPATIENT
Start: 2024-12-19 | End: 2025-03-19

## 2024-12-19 RX ADMIN — ENOXAPARIN SODIUM 40 MG: 40 INJECTION SUBCUTANEOUS at 08:12

## 2024-12-19 RX ADMIN — PREDNISONE 40 MG: 20 TABLET ORAL at 08:12

## 2024-12-19 RX ADMIN — FLUTICASONE FUROATE AND VILANTEROL TRIFENATATE 1 PUFF: 200; 25 POWDER RESPIRATORY (INHALATION) at 07:12

## 2024-12-19 RX ADMIN — IPRATROPIUM BROMIDE AND ALBUTEROL SULFATE 3 ML: 2.5; .5 SOLUTION RESPIRATORY (INHALATION) at 03:12

## 2024-12-19 RX ADMIN — MUPIROCIN: 20 OINTMENT TOPICAL at 08:12

## 2024-12-19 RX ADMIN — IPRATROPIUM BROMIDE AND ALBUTEROL SULFATE 3 ML: 2.5; .5 SOLUTION RESPIRATORY (INHALATION) at 07:12

## 2024-12-19 RX ADMIN — IPRATROPIUM BROMIDE AND ALBUTEROL SULFATE 3 ML: 2.5; .5 SOLUTION RESPIRATORY (INHALATION) at 11:12

## 2024-12-19 NOTE — HOSPITAL COURSE
Admitted to MICU for asthma exacerbation requiring BiPAP. Patient given breathing duo/nebs, ICS/LABA, and IV steroids. Weaned off of BiPAP. Monitored for 24 hours off BiPAP, clinically stable for step-down to hospital medicine.      Deemed appropriate for transfer to the floor on 12/18/2024, and was accepted to  team Q for further care and management.

## 2024-12-19 NOTE — NURSING
Patient wheeled to . Patient stable with no complaints. Patient in [employer] safely with medication, Patient belongings, and DC paperwork in hand.

## 2024-12-19 NOTE — PLAN OF CARE
SW has scheduled a [employer] for transportation home.   is Jai and contact ph# is [phone]. He is in a silver Honda HR-V and license # [id number].  Bedside RN has been notified of 's route time.    Ethan Nassar, HUBERT  [facility]  [phone]

## 2024-12-19 NOTE — HPI
Molly Aly is a 35yo F, PMH asthma, microcytic anemia, initially presenting to ED for SOB; consulted to MICU for asthma exacerbation requiring bipap.     Endorses symptoms beginning insidiously in the afternoon, including shortness of breath and wheezing. Took 1x albuterol inhaler without resolution, called EMS. On EMS arrival, pt wheezing diffusely with O2 sat 87%; given 125 solumedrol and 1x duoneb en route to hospital. Upon arrival, patient still short of breath and wheezing; placed on bipap 10/5 with increasing ease of breathing. During interview, patient able to answer questions in full sentences while on bipap 12/8. States she was previously on fluticasone but stopped approx 1mo ago because insurance no longer covers fluticasone. Denies any fever/chills, conjunctivitis, coughing/phlegm, gerd, CP/palpitations, ataxia, leg swelling. PMH as stated above, currently only on albuterol. No significant substance use, no known sick contacts.

## 2024-12-19 NOTE — ASSESSMENT & PLAN NOTE
Plan to d/c today. Will follow up with PCP, Dr. Stevens with pulmonology. Appropriate referrals sent.   Will d/c with duoneb, fluticasone furoate-vilanterol prescriptions. Albuterol rescue script refilled.  Will d/c with prednisone to complete 5 day course of prednisone 40 mg daily.

## 2024-12-19 NOTE — DISCHARGE INSTRUCTIONS
Schedule an appointment with your PCP for a hospital follow-up appointment within the next 1 to 2 weeks  Go to the ED if you experience any of the following: fever, chills, chest pain, difficulty breathing, abdominal pain, nausea, vomiting, diarrhea, or debilitating pain.  Call your PCP for any other concerns. If your PCP is unavailable, please go to the ED.

## 2024-12-19 NOTE — PLAN OF CARE
CHW scheduled pcp f/u   Future Appointments   Date Time Provider Department Center   1/7/2025  1:00 PM [facility]    Pt was put on waiting list

## 2024-12-19 NOTE — DISCHARGE SUMMARY
[facility]  Critical Care Medicine  Discharge Summary      Patient Name: Molly Aly  MRN: [id number]  Admission Date: 12/17/2024  Hospital Length of Stay: 2 days  Discharge Date and Time:  12/19/2024 1:52 PM  Attending Physician: Ben Garrett MD   Discharging Provider: Du Tinajero MD  Primary Care Provider: Best Light MD  Reason for Admission: Asthma exacerbation, severe respiratory distress     HPI:   Molly Aly is a 33yo F, PMH asthma, microcytic anemia, initially presenting to ED for SOB; consulted to MICU for asthma exacerbation requiring bipap.    Endorses symptoms beginning insidiously in the afternoon, including shortness of breath and wheezing. Took 1x albuterol inhaler without resolution, called EMS. On EMS arrival, pt wheezing diffusely with O2 sat 87%; given 125 solumedrol and 1x duoneb en route to hospital. Upon arrival, patient still short of breath and wheezing; placed on bipap 10/5 with increasing ease of breathing. During interview, patient able to answer questions in full sentences while on bipap 12/8. States she was previously on fluticasone but stopped approx 1mo ago because insurance no longer covers fluticasone. Denies any fever/chills, conjunctivitis, coughing/phlegm, gerd, CP/palpitations, ataxia, leg swelling. PMH as stated above, currently only on albuterol. No significant substance use, no known sick contacts.    * No surgery found *    Indwelling Lines/Drains at Time of Discharge:   Lines/Drains/Airways       None                 Hospital Course:   Admitted to MICU for asthma exacerbation requiring BiPAP. Patient given breathing duo/nebs, ICS/LABA, and IV steroids. Weaned off of BiPAP.  Monitored for 24 hours off BiPAP, clinically stable for step-down to hospital medicine on 12/18. Symptoms continued to improve on 12/19, was stable for discharge. Discharged with remainder of 5 day prednisone course.     Consults (From admission, onward)          Status Ordering  Provider     Inpatient consult to Critical Care Medicine  Once        Provider:  (Not yet assigned)    Completed PIETER BRAUN          Significant Labs:  All pertinent labs within the past 24 hours have been reviewed.    Significant Imaging:  I have reviewed all pertinent imaging results/findings within the past 24 hours.    Pending Diagnostic Studies:       Procedure Component Value Units Date/Time    RESPIRATORY PATHOGENS PANEL (TEM-PCR) Nasopharyngeal Swab [[id number]] Collected: 12/17/24 1021    Order Status: Sent Lab Status: In process Updated: 12/17/24 1154          Final Active Diagnoses:    Diagnosis Date Noted POA    PRINCIPAL PROBLEM:  Moderate persistent asthma without complication [J45.40] 02/23/2022 Yes    Microcytic anemia [D50.9] 04/04/2024 Yes      Problems Resolved During this Admission:     No new Assessment & Plan notes have been filed under this hospital service since the last note was generated.  Service: Critical Care Medicine    Discharged Condition: stable    Disposition: Home or Self Care     Follow-up Information       Best Light MD. Go in 1 week(s).    Specialty: Internal Medicine  Contact information:  [address] [place] [address] [id number] [phone]                           Patient Instructions:      Ambulatory referral/consult to Pulmonology   Standing Status: Future   Referral Priority: Routine Referral Type: Consultation   Referral Reason: Specialty Services Required   Referred to Provider: LAQUITA JOSEPH Requested Specialty: Pulmonary Disease   Number of Visits Requested: 1     Medications:  Reconciled Home Medications:      Medication List        START taking these medications      albuterol-ipratropium 2.5 mg-0.5 mg/3 mL nebulizer solution  Commonly known as: DUO-NEB  Take 3 mLs by nebulization every 4 (four) hours. Rescue     BREO ELLIPTA 200-25 mcg/dose Dsdv diskus inhaler  Generic drug: fluticasone furoate-vilanteroL  Inhale 1 puff into the lungs once daily.  Controller  Start taking on: December 20, 2024     predniSONE 20 MG tablet  Commonly known as: DELTASONE  Take 2 tablets (40 mg total) by mouth once daily. for 3 days  Start taking on: December 20, 2024            CHANGE how you take these medications      albuterol 90 mcg/actuation inhaler  Commonly known as: PROVENTIL HFA  Inhale 2 puffs into the lungs every 6 (six) hours as needed for Wheezing. Rescue  What changed:   how much to take  when to take this  reasons to take this  Another medication with the same name was removed. Continue taking this medication, and follow the directions you see here.            CONTINUE taking these medications      VIOS AEROSOL DELIVERY SYSTEM Fanny  Generic drug: nebulizer and compressor  USE AS DIRECTED WITH SOLUTION            STOP taking these medications      fluticasone-salmeterol 250-50 mcg/dose 250-50 mcg/dose diskus inhaler  Commonly known as: ADVAIR     WIXELA INHUB 500-50 mcg/dose Dsdv diskus inhaler  Generic drug: fluticasone-salmeterol 500-50 mcg/dose               Du Tinajero MD  Critical Care Medicine  [facility] ICU

## 2024-12-19 NOTE — SUBJECTIVE & OBJECTIVE
Interval History/Significant Events: NAEON, off BiPAP, continued breathing treatments and steroids. Plan to D/c today. Will finish 5 day steroid course at home. Will d/c with ICS/LABA treatment, Duonebs. Refills on albuterol rescue sent in, has rescue inhaler with her right now.     Review of Systems   Constitutional: Negative.    Respiratory:  Negative for shortness of breath and wheezing.    Cardiovascular: Negative.    Gastrointestinal: Negative.    Genitourinary: Negative.    Neurological: Negative.    Psychiatric/Behavioral: Negative.       Objective:     Vital Signs (Most Recent):  Temp: 97.9 °F (36.6 °C) (12/19/24 1206)  Pulse: (!) 122 (12/19/24 1206)  Resp: 16 (12/19/24 1206)  BP: 134/74 (12/19/24 1206)  SpO2: 95 % (12/19/24 1206) Vital Signs (24h Range):  Temp:  [97.9 °F (36.6 °C)-98.7 °F (37.1 °C)] 97.9 °F (36.6 °C)  Pulse:  [] 122  Resp:  [11-52] 16  SpO2:  [95 %-100 %] 95 %  BP: (122-154)/(72-96) 134/74   Weight: 106.1 kg (233 lb 14.5 oz)  Body mass index is 41.43 kg/m².      Intake/Output Summary (Last 24 hours) at 12/19/2024 1311  Last data filed at 12/19/2024 0000  Gross per 24 hour   Intake 480 ml   Output --   Net 480 ml          Physical Exam  Vitals and nursing note reviewed. Exam conducted with a chaperone present.   Constitutional:       General: She is not in acute distress.     Comments: A&O x4, GCS 15, NAD, speaking in complete sentences.   HENT:      Head: Normocephalic and atraumatic.   Cardiovascular:      Rate and Rhythm: Normal rate and regular rhythm.      Pulses: Normal pulses.      Heart sounds: No murmur heard.     No friction rub. No gallop.   Pulmonary:      Effort: Pulmonary effort is normal. No respiratory distress.      Breath sounds: No wheezing, rhonchi or rales.   Abdominal:      General: Abdomen is flat. There is no distension.      Tenderness: There is no abdominal tenderness. There is no guarding.   Musculoskeletal:         General: No deformity or signs of injury.       Right lower leg: No edema.      Left lower leg: No edema.   Skin:     General: Skin is warm.      Capillary Refill: Capillary refill takes less than 2 seconds.      Findings: No lesion or rash.   Neurological:      Mental Status: She is oriented to person, place, and time.   Psychiatric:         Mood and Affect: Mood normal.         Behavior: Behavior normal.            Vents:  Oxygen Concentration (%): 21 (12/17/24 0801)  Lines/Drains/Airways       Peripheral Intravenous Line  Duration                  Peripheral IV - Single Lumen 12/17/24 0748 20 G Right Antecubital 2 days         Peripheral IV - Single Lumen 12/19/24 0653 18 G 3/4 in Anterior;Right Forearm <1 day                  Significant Labs:    CBC/Anemia Profile:  Recent Labs   Lab 12/18/24  0401 12/19/24  0649   WBC 18.81* 13.29*   HGB 9.7* 10.0*   HCT 32.2* 31.5*    267   MCV 78* 78*   RDW 17.2* 17.5*        Chemistries:  Recent Labs   Lab 12/18/24  0401 12/19/24  0649    139   K 3.7 3.7    107   CO2 19* 23   BUN 15 16   CREATININE 0.8 0.8   CALCIUM 8.9 8.7   ALBUMIN 3.4* 3.3*   PROT 7.7 7.0   BILITOT 0.3 0.4   ALKPHOS 59 52   ALT 15 13   AST 15 13   MG 2.5 2.2   PHOS 4.4 3.7       All pertinent labs within the past 24 hours have been reviewed.    Significant Imaging:  I have reviewed all pertinent imaging results/findings within the past 24 hours.

## 2024-12-19 NOTE — PLAN OF CARE
[facility]  Discharge Final Note    Primary Care Provider: Best Light MD    Expected Discharge Date: 12/19/2024    Final Discharge Note (most recent)       Final Note - 12/19/24 1656          Final Note    Assessment Type Final Discharge Note     Anticipated Discharge Disposition Home or Self Care     What phone number can be called within the next 1-3 days to see how you are doing after discharge? [phone]     Hospital Resources/Appts/Education Provided Provided patient/caregiver with written discharge plan information        Post-Acute Status    Coverage BC BS All out of State     Discharge Delays None known at this time                     Important Message from Medicare             Contact Info       Best Light MD   Specialty: Internal Medicine   Relationship: PCP - General    [address]  [facility] [address]   Phone: [phone]       Next Steps: Go in 1 week(s)          Patient discharged home with PCP appt scheduled.  Patient requested MAPPING  for transportation home.  Information given to her per medical staff for f/u visits with Specialty and Primary clinic.     Ethan Nassar LMSW  [facility]  [phone]

## 2024-12-19 NOTE — PROGRESS NOTES
[facility] - Medical [facility]  Critical Care Medicine  Progress Note    Patient Name: Molly Aly  MRN: [id number]  Admission Date: 12/17/2024  Hospital Length of Stay: 2 days  Code Status: Full Code  Attending Provider: Ben Garrett MD  Primary Care Provider: Best Light MD   Principal Problem: Moderate persistent asthma without complication    Subjective:     HPI:  Molly Aly is a 33yo F, PMH asthma, microcytic anemia, initially presenting to ED for SOB; consulted to MICU for asthma exacerbation requiring bipap.    Endorses symptoms beginning insidiously in the afternoon, including shortness of breath and wheezing. Took 1x albuterol inhaler without resolution, called EMS. On EMS arrival, pt wheezing diffusely with O2 sat 87%; given 125 solumedrol and 1x duoneb en route to hospital. Upon arrival, patient still short of breath and wheezing; placed on bipap 10/5 with increasing ease of breathing. During interview, patient able to answer questions in full sentences while on bipap 12/8. States she was previously on fluticasone but stopped approx 1mo ago because insurance no longer covers fluticasone. Denies any fever/chills, conjunctivitis, coughing/phlegm, gerd, CP/palpitations, ataxia, leg swelling. PMH as stated above, currently only on albuterol. No significant substance use, no known sick contacts.    Hospital/ICU Course:  Admitted to MICU for asthma exacerbation requiring BiPAP. Patient given breathing duo/nebs, ICS/LABA, and IV steroids. Weaned off of BiPAP.  Monitored for 24 hours off BiPAP, clinically stable for step-down to hospital medicine on 12/18. Symptoms continued to improve on 12/19, was stable for discharge. Discharged with remainder of 5 day prednisone course.     Interval History/Significant Events: NAEON, off BiPAP, continued breathing treatments and steroids. Plan to D/c today. Will finish 5 day steroid course at home. Will d/c with ICS/LABA treatment, Duonebs. Refills on albuterol rescue sent  in, has rescue inhaler with her right now.     Review of Systems   Constitutional: Negative.    Respiratory:  Negative for shortness of breath and wheezing.    Cardiovascular: Negative.    Gastrointestinal: Negative.    Genitourinary: Negative.    Neurological: Negative.    Psychiatric/Behavioral: Negative.       Objective:     Vital Signs (Most Recent):  Temp: 97.9 °F (36.6 °C) (12/19/24 1206)  Pulse: (!) 122 (12/19/24 1206)  Resp: 16 (12/19/24 1206)  BP: 134/74 (12/19/24 1206)  SpO2: 95 % (12/19/24 1206) Vital Signs (24h Range):  Temp:  [97.9 °F (36.6 °C)-98.7 °F (37.1 °C)] 97.9 °F (36.6 °C)  Pulse:  [] 122  Resp:  [11-52] 16  SpO2:  [95 %-100 %] 95 %  BP: (122-154)/(72-96) 134/74   Weight: 106.1 kg (233 lb 14.5 oz)  Body mass index is 41.43 kg/m².      Intake/Output Summary (Last 24 hours) at 12/19/2024 1311  Last data filed at 12/19/2024 0000  Gross per 24 hour   Intake 480 ml   Output --   Net 480 ml          Physical Exam  Vitals and nursing note reviewed. Exam conducted with a chaperone present.   Constitutional:       General: She is not in acute distress.     Comments: A&O x4, GCS 15, NAD, speaking in complete sentences.   HENT:      Head: Normocephalic and atraumatic.   Cardiovascular:      Rate and Rhythm: Normal rate and regular rhythm.      Pulses: Normal pulses.      Heart sounds: No murmur heard.     No friction rub. No gallop.   Pulmonary:      Effort: Pulmonary effort is normal. No respiratory distress.      Breath sounds: No wheezing, rhonchi or rales.   Abdominal:      General: Abdomen is flat. There is no distension.      Tenderness: There is no abdominal tenderness. There is no guarding.   Musculoskeletal:         General: No deformity or signs of injury.      Right lower leg: No edema.      Left lower leg: No edema.   Skin:     General: Skin is warm.      Capillary Refill: Capillary refill takes less than 2 seconds.      Findings: No lesion or rash.   Neurological:      Mental Status: She  is oriented to person, place, and time.   Psychiatric:         Mood and Affect: Mood normal.         Behavior: Behavior normal.            Vents:  Oxygen Concentration (%): 21 (12/17/24 0801)  Lines/Drains/Airways       Peripheral Intravenous Line  Duration                  Peripheral IV - Single Lumen 12/17/24 0748 20 G Right Antecubital 2 days         Peripheral IV - Single Lumen 12/19/24 0653 18 G 3/4 in Anterior;Right Forearm <1 day                  Significant Labs:    CBC/Anemia Profile:  Recent Labs   Lab 12/18/24  0401 12/19/24  0649   WBC 18.81* 13.29*   HGB 9.7* 10.0*   HCT 32.2* 31.5*    267   MCV 78* 78*   RDW 17.2* 17.5*        Chemistries:  Recent Labs   Lab 12/18/24  0401 12/19/24  0649    139   K 3.7 3.7    107   CO2 19* 23   BUN 15 16   CREATININE 0.8 0.8   CALCIUM 8.9 8.7   ALBUMIN 3.4* 3.3*   PROT 7.7 7.0   BILITOT 0.3 0.4   ALKPHOS 59 52   ALT 15 13   AST 15 13   MG 2.5 2.2   PHOS 4.4 3.7       All pertinent labs within the past 24 hours have been reviewed.    Significant Imaging:  I have reviewed all pertinent imaging results/findings within the past 24 hours.    ABG  Recent Labs   Lab 12/17/24  0612   PH 7.262*   PO2 29*   PCO2 59.9*   HCO3 27.0   BE 0     Assessment/Plan:     Pulmonary  * Moderate persistent asthma without complication  Plan to d/c today. Will follow up with PCP, Dr. Stevens with pulmonology. Appropriate referrals sent.   Will d/c with duoneb, fluticasone furoate-vilanterol prescriptions. Albuterol rescue script refilled.  Will d/c with prednisone to complete 5 day course of prednisone 40 mg daily.     Oncology  Microcytic anemia  -per chart review, previous documentation of heavy menstrual cycles and currently prescribed Fe supplement  -Fe studies during prior admits consistent with Fe deficit anemia  -initial H&H 11.1/37.5; no lightheadedness/vision changes/HA/ other Sx associated with Sx anemia  -CBC qd while admitted, will continue to monitor          Critical Care Daily Checklist:    A: Awake: RASS Goal/Actual Goal:    Actual:     B: Spontaneous Breathing Trial Performed?     C: SAT & SBT Coordinated?                        D: Delirium: CAM-ICU     E: Early Mobility Performed? Yes   F: Feeding Goal:    Status:     Current Diet Order   Procedures    Diet Adult Regular      AS: Analgesia/Sedation tylenol   T: Thromboembolic Prophylaxis lovenox   H: HOB > 300 Yes   U: Stress Ulcer Prophylaxis (if needed)    G: Glucose Control Ssi prn   B: Bowel Function Stool Occurrence: 1   I: Indwelling Catheter (Lines & Sam) Necessity    D: De-escalation of Antimicrobials/Pharmacotherapies     Plan for the day/ETD D/c today    Code Status:  Family/Goals of Care: Full Code         Critical secondary to Patient has a condition that poses threat to life and bodily function: Respiratory distress secondary to asthma exacerbation.      Critical care was time spent personally by me on the following activities: development of treatment plan with patient or surrogate and bedside caregivers, discussions with consultants, evaluation of patient's response to treatment, examination of patient, ordering and performing treatments and interventions, ordering and review of laboratory studies, ordering and review of radiographic studies, pulse oximetry, re-evaluation of patient's condition. This critical care time did not overlap with that of any other provider or involve time for any procedures.     Du Tinajero MD  Critical Care Medicine  [facility] - Medical ICU

## 2024-12-19 NOTE — PLAN OF CARE
Patient is ready for discharge to home.  She is waiting on bedside pharmacy and once medication(s) are/is received RN will notify SW to set up Ly[employer].      Ethan Nassar, HUBERT  [facility]  [phone]

## 2024-12-19 NOTE — PLAN OF CARE
MICU DAILY GOALS     Family/Goals of care/Code Status   Code Status: Full Code    24H Vital Sign Range  Temp:  [97.9 °F (36.6 °C)-98.7 °F (37.1 °C)]   Pulse:  []   Resp:  [11-52]   BP: (127-154)/(72-96)   SpO2:  [95 %-100 %]      Shift Events (include procedures and significant events)   No acute events throughout shift    AWAKE RASS: Goal -    Actual - RASS (Nash Agitation-Sedation Scale): alert and calm    Restraint necessity: Not necessary   BREATHE SBT: NA    Coordinate A & B, analgesics/sedatives Pain: managed   SAT: NA   Delirium CAM-ICU:     Early(intubated/ Progressive (non-intubated) Mobility MOVE Screen (INTUBATED ONLY): NA    Activity: Activity Management: Rolling - L1   Feeding/Nutrition Diet order: Diet/Nutrition Received: regular,     Thrombus DVT prophylaxis: VTE Core Measure: Pharmacological prophylaxis initiated/maintained   HOB Elevation Head of Bed (HOB) Positioning: HOB at 30-45 degrees   Ulcer Prophylaxis GI: yes   Glucose control managed     Skin Skin assessment:     Sacrum intact/not altered? Yes  Heels intact/not altered? Yes  Surgical wound? No    CHECK ONE!   (no altered skin or altered skin) and sub boxes:  [x] No Altered Skin Integrity Present    []Prevention Measures Documented    [] Altered Skin Integrity Present or Discovered   [] LDA present in EPIC, daily doc completed              [] LDA added if not in EPIC (describe wound).                    When describing wound, do not stage, use descriptive words only.    [] Wound Image Taken (required on admit,                   transfer/discharge and every Tuesday)    Wound Care Consulted? No   Bowel Function no issues    Indwelling Catheter Necessity         N/A   De-escalation Antibiotics No        VS and assessment per flow sheet, patient progressing towards goals as tolerated, plan of care reviewed with [unfilled] and family, all concerns addressed, will continue to monitor.

## 2024-12-19 NOTE — NURSING
MICU DAILY GOALS     Family/Goals of care/Code Status   Code Status: Full Code    24H Vital Sign Range  Temp:  [98.2 °F (36.8 °C)-98.9 °F (37.2 °C)]   Pulse:  []   Resp:  [11-52]   BP: (122-154)/(72-96)   SpO2:  [95 %-100 %]      Shift Events (include procedures and significant events)   No Acute Events Over Night    AWAKE RASS: Goal -    Actual - RASS (Nash Agitation-Sedation Scale): alert and calm    Restraint necessity: Not necessary   BREATHE SBT: Not intubated    Coordinate A & B, analgesics/sedatives Pain: managed   SAT: Not intubated   Delirium CAM-ICU:     Early(intubated/ Progressive (non-intubated) Mobility MOVE Screen (INTUBATED ONLY): Not intubated    Activity: Activity Management: Ambulated to bathroom - L4   Feeding/Nutrition Diet order: Diet/Nutrition Received: regular,     Thrombus DVT prophylaxis: VTE Core Measure: Pharmacological prophylaxis initiated/maintained   HOB Elevation Head of Bed (HOB) Positioning: HOB at 30-45 degrees   Ulcer Prophylaxis GI: yes   Glucose control managed     Skin Skin assessment:     Sacrum intact/not altered? Yes  Heels intact/not altered? Yes  Surgical wound? No    CHECK ONE!   (no altered skin or altered skin) and sub boxes:  [x] No Altered Skin Integrity Present    [x]Prevention Measures Documented    [] Altered Skin Integrity Present or Discovered   [] LDA present in EPIC, daily doc completed              [] LDA added if not in EPIC (describe wound).                    When describing wound, do not stage, use descriptive words only.    [] Wound Image Taken (required on admit,                   transfer/discharge and every Tuesday)    Wound Care Consulted? No   Bowel Function no issues    Indwelling Catheter Necessity            De-escalation Antibiotics No        VS and assessment per flow sheet, patient progressing towards goals as tolerated, plan of care reviewed with  PT , all concerns addressed, will continue to monitor.;

## 2024-12-20 LAB
ENTEROVIRUS/RHINOVIRUS: NOT DETECTED
HUMAN BOCAVIRUS: NOT DETECTED
HUMAN CORONAVIRUS, COMMON COLD VIRUS: NOT DETECTED
INFLUENZA A - H1N1-09: NOT DETECTED
LEGIONELLA PNEUMOPHILA: NOT DETECTED
MORAXELLA CATARRHALIS: NOT DETECTED
PARAINFLUENZA: NOT DETECTED
RVP - ADENOVIRUS: NOT DETECTED
RVP - HUMAN METAPNEUMOVIRUS (HMPV): NOT DETECTED
RVP - INFLUENZA A: NOT DETECTED
RVP - INFLUENZA B: NOT DETECTED
RVP - RESPIRATORY SYNCTIAL VIRUS (RSV) A: NOT DETECTED
RVP - RESPIRATORY VIRAL PANEL, SOURCE: NORMAL
TEM - ACINETOBACTER BAUMANNII: NOT DETECTED
TEM - BORDETELLA PERTUSSIS: NOT DETECTED
TEM - CHLAMYDOPHILA PNEUMONIAE: NOT DETECTED
TEM - KLEBSIELLA PNEUMONIAE: NOT DETECTED
TEM - MRSA: NOT DETECTED
TEM - MYCOPLASMA PNEUMONIAE: NOT DETECTED
TEM - NEISSERIA MENINGITIDIS: NOT DETECTED
TEM - PANTON-VALENTINE: NOT DETECTED
TEM - PSEUDOMONAS AERUGINOSA: NOT DETECTED
TEM - STAPHYLOCOCCUS AUREUS: NOT DETECTED
TEM - STREPTOCOCCUS PNEUMONIAE: NOT DETECTED
TEM - STREPTOCOCCUS PYOGENES A: NOT DETECTED
TEM- HAEMOPHILUS INFLUENZAE B: NOT DETECTED
TEM- HAEMOPHILUS INFLUENZAE: NOT DETECTED

## 2025-02-19 ENCOUNTER — TELEPHONE (OUTPATIENT)
Dept: OBSTETRICS AND GYNECOLOGY | Facility: CLINIC | Age: 35
End: 2025-02-19
Payer: COMMERCIAL

## 2025-02-19 NOTE — TELEPHONE ENCOUNTER
----- Message from Med Assistant Angela sent at 2/8/2024 11:36 AM CST -----  ew All Conversations on this Encounter  Lee Ann Guzman MA7 minutes ago (11:29 AM)    EC  Message left for patient to return my call.       Note    Lee Ann Guzman MA  Molly Aly [phone] minutes ago (11:29 AM)    Ricky Sosa MD [facility] T Staff2 days ago      Please help her schedule EMBX.  She is aware.  Thanks.    Ricky Sosa MD2 days ago      Called patient:     Discussed results of pelvic sono:     FINDINGS:  Uterus:  Size: 8.2 x 4.3 x 5.0 cm  Masses: None  Endometrium: Normal in thickness in this pre menopausal patient, measuring 12.3 mm.  There is diffuse heterogeneity of the endometrial stripe.  There is a more focal echogenic region within the fundus of the endometrial cavity and an endometrial polyp cannot be completely excluded.  Right ovary:  Size: 2.3 x 2.4 x 1.6 cm  Appearance: Normal  Vascular flow: Normal.  Left ovary:  Size: 2.9 x 1.7 x 3.3 cm  Appearance: Normal  Vascular Flow: Normal.  Free Fluid:  None.  Impression:  Abnormal appearance of the endometrial stripe which demonstrates diffuse heterogeneity with an echogenic focus within the fundal aspect of the endometrial cavity.  An endometrial polyp cannot be excluded.     Discussed abnormal appearing endometrium, possible endometrial polyp.  REC - EMBX, may need hysteroscopy.      Note    Ricky Sosa MD2 days ago

## 2025-02-19 NOTE — TELEPHONE ENCOUNTER
Tried to contact the patient again to schedule an EMBX and patient is due for her annual gyn exam. No VM available.

## 2025-03-26 ENCOUNTER — ON-DEMAND VIRTUAL (OUTPATIENT)
Dept: URGENT CARE | Facility: CLINIC | Age: 35
End: 2025-03-26
Payer: COMMERCIAL

## 2025-03-26 DIAGNOSIS — R09.81 NASAL CONGESTION: Primary | ICD-10-CM

## 2025-03-26 DIAGNOSIS — J45.901 EXACERBATION OF ASTHMA, UNSPECIFIED ASTHMA SEVERITY, UNSPECIFIED WHETHER PERSISTENT: ICD-10-CM

## 2025-03-26 DIAGNOSIS — R06.2 WHEEZING: ICD-10-CM

## 2025-03-26 RX ORDER — AZELASTINE 1 MG/ML
2 SPRAY, METERED NASAL 2 TIMES DAILY
Qty: 30 ML | Refills: 0 | Status: SHIPPED | OUTPATIENT
Start: 2025-03-26 | End: 2026-03-26

## 2025-03-26 RX ORDER — IPRATROPIUM BROMIDE AND ALBUTEROL SULFATE 2.5; .5 MG/3ML; MG/3ML
3 SOLUTION RESPIRATORY (INHALATION) EVERY 4 HOURS
Qty: 1620 ML | Refills: 0 | Status: SHIPPED | OUTPATIENT
Start: 2025-03-26 | End: 2025-06-24

## 2025-03-26 RX ORDER — FLUTICASONE FUROATE AND VILANTEROL 200; 25 UG/1; UG/1
1 POWDER RESPIRATORY (INHALATION) DAILY
Qty: 60 EACH | Refills: 0 | Status: SHIPPED | OUTPATIENT
Start: 2025-03-26

## 2025-03-26 RX ORDER — ALBUTEROL SULFATE 90 UG/1
2 INHALANT RESPIRATORY (INHALATION) EVERY 6 HOURS PRN
Qty: 18 G | Refills: 1 | Status: SHIPPED | OUTPATIENT
Start: 2025-03-26 | End: 2025-05-25

## 2025-03-26 RX ORDER — PREDNISONE 20 MG/1
20 TABLET ORAL DAILY
Qty: 5 TABLET | Refills: 0 | Status: SHIPPED | OUTPATIENT
Start: 2025-03-26 | End: 2025-03-31

## 2025-03-26 NOTE — PATIENT INSTRUCTIONS
"Patient Education       Asthma Discharge Instructions, Adult   About this topic   Asthma is a lung disease in which the airways are swollen and narrowed. This causes a person to have trouble breathing. Many things can cause asthma to get worse like a cold, allergies, or cold weather. It is important that you follow up with your doctor. You also need to take your asthma medicines as the doctor has ordered them. Sometimes, the doctors will give you an asthma action plan or a peak flow meter. These can help you manage your asthma.     What care is needed at home?   Ask your doctor what you need to do when you go home. Make sure you ask questions if you do not understand what the doctor says. This way you will know what you need to do.  Know how and when to take your asthma medicines. The doctor may order drugs such as:  "Quick-relief" or "rescue" drugs - These relax the muscles around the airways and help you breathe easier. They are used to relieve symptoms when they happen. Albuterol is a common example.  "Controller" medicines - These are drugs you take every day to help prevent asthma attacks. They reduce swelling of the airways. It is important to take your controller medicine every day, even when you feel well.  Do not smoke. Do not allow others to smoke near you or in the car with you. Smoke can stay on clothes and furniture and cause breathing problems.  Learn about what triggers your asthma. Stay away from those things. Common triggers are exercise; allergens, such as dust, pollens, or pet hair; and scents from cleaning products, detergents, or perfumes.  Know if you need an extra dose of your quick relief inhaler before you exercise. If you have an inhaler to use when you are feeling short of breath, be sure to carry it with you.  Follow your asthma action plan if you have one. Use your peak flow meter if the doctor has ordered one for you. The peak flow meter helps measure how well your lungs are working.   "   What follow-up care is needed?   Your doctor may ask you to make visits to the office to check on your progress. Be sure to keep these visits.  What drugs may be needed?   The doctor may order drugs to:  Relax the muscles around your airways. This is a quick-acting drug that will help you breathe easier. These are your rescue drugs.  Prevent an asthma attack. These drugs reduce swelling of the airways in your lungs. These are your controller drugs.  Follow your Asthma Action Plan to know what drugs to take.       Will physical activity be limited?   If your asthma is well controlled, you should not need to limit your activity. Talk to your doctor if you are having trouble during your everyday activities.  What problems could happen?   Constant coughing  Trouble breathing  Decreased lung function  What can be done to prevent this health problem?   Asthma cannot be prevented. You can work to prevent asthma attacks. Here are some things that may help:  Learn about what triggers your asthma. Stay away from those things. Common triggers are dust and pollens; scents from candles, detergents, or perfumes; and pet hair.  Do not smoke. Do not allow others to smoke near you or in the car with you. Smoke can linger on clothes and furniture and cause breathing problems.  Treat cough and colds. These can start an asthma attack.   Make sure you get a flu shot each year.  When do I need to call the doctor?   You have extreme trouble breathing, such as you cannot speak in full sentences, you are very tired from working to catch your breath, or you are sweating from trying to breathe.  You have trouble breathing when talking or sitting still.  Your asthma flare-up is not getting better even though you have used your inhaler a few times.  If you have to use your quick-relief inhaler 2 to 3 times in a week to treat symptoms (not to prevent symptoms when you exercise).  You are not able to do your normal activities because of your  breathing.  Your cough gets worse or you start to cough up yellow or green mucus.  You start to run low on your asthma medicines.  Teach Back: Helping You Understand   The Teach Back Method helps you understand the information we are giving you. After you talk with the staff, tell them in your own words what you learned. This helps to make sure the staff has described each thing clearly. It also helps to explain things that may have been confusing. Before going home, make sure you can do these:  I can tell you about my condition.  I can tell you the difference between a rescue drug and a controller drug.  I can tell you what I will do if it has been 15 minutes since my last treatment and I am not breathing any better.  I can tell you what I will do if I am using my rescue inhaler 2 to 3 times in one week.  Where can I learn more?   American Academy [employer]icians  https://familydoctor.org/asthma-action-plan/   Health Direct  https://www.healthdirect.gov.au/asthma   NHS Choices  https://www.nhs.uk/conditions/asthma/   Last Reviewed Date   2021-06-08  Consumer Information Use and Disclaimer   This information is not specific medical advice and does not replace information you receive from your health care provider. This is only a brief summary of general information. It does NOT include all information about conditions, illnesses, injuries, tests, procedures, treatments, therapies, discharge instructions or life-style choices that may apply to you. You must talk with your health care provider for complete information about your health and treatment options. This information should not be used to decide whether or not to accept your health care providers advice, instructions or recommendations. Only your health care provider has the knowledge and training to provide advice that is right for you.  Copyright   Copyright © 2021 [employer] and its affiliates and/or licensors. All rights reserved.

## 2025-03-26 NOTE — PROGRESS NOTES
Subjective:      Patient ID: Molly Aly is a 34 y.o. female.    Vitals:  vitals were not taken for this visit.     Chief Complaint: Shortness of Breath, Wheezing, and Allergies      Visit Type: TELE AUDIOVISUAL    Patient Location: Work     Present with the patient at the time of consultation: TELEMED PRESENT WITH PATIENT: None    Past Medical History:   Diagnosis Date    Anemia     Asthma     Chest tightness 2024    Menorrhagia     Metabolic acidosis 2024     History reviewed. No pertinent surgical history.  Review of patient's allergies indicates:  No Known Allergies  Medications Ordered Prior to Encounter[1]  Family History   Problem Relation Name Age of Onset    Asthma Father      Asthma Sister      Asthma Brother      Asthma Paternal Aunt      Asthma Paternal Uncle      Breast cancer Neg Hx      Colon cancer Neg Hx      Ovarian cancer Neg Hx         Medications Ordered                [id number] - [place] - [facility] AT [facility] [address]   [facility] [address] [address]    Telephone: [phone]   Fax: [phone]   Hours: Not open 24 hours                         E-Prescribed (5 of 5)              albuterol (PROVENTIL HFA) 90 mcg/actuation inhaler    Sig: Inhale 2 puffs into the lungs every 6 (six) hours as needed for Wheezing. Rescue       Start: 3/26/25     Quantity: 18 g Refills: 1                         albuterol-ipratropium (DUO-NEB) 2.5 mg-0.5 mg/3 mL nebulizer solution    Sig: Take 3 mLs by nebulization every 4 (four) hours. Rescue       Start: 3/26/25     Quantity: 1620 mL Refills: 0                         azelastine (ASTELIN) 137 mcg (0.1 %) nasal spray    Si sprays (274 mcg total) by Nasal route 2 (two) times daily.       Start: 3/26/25     Quantity: 30 mL Refills: 0                         fluticasone furoate-vilanteroL (BREO) 200-25 mcg/dose DsDv diskus inhaler    Sig: Inhale 1 puff into the lungs once daily.  Controller       Start: 3/26/25     Quantity: 60 each Refills: 0                         predniSONE (DELTASONE) 20 MG tablet    Sig: Take 1 tablet (20 mg total) by mouth once daily. for 5 days       Start: 3/26/25     Quantity: 5 tablet Refills: 0                           Ohs Peq Odvv Intake    3/26/2025  9:37 AM CDT - Filed by Patient   What is your current physical address in the event of a medical emergency? [address] [facility] [address]   Are you able to take your vital signs? No   Please attach any relevant images or files    Is your employer contracted with [facility]? No         Hx of asthma and allergies. Breathing treatments help briefly. +SOB and wheezing. No respiratory distress. Able to speak in complete sentences. No accessory muscle use. + congestion for 1 week. Taking Zyrtec daily. Seeking refills and further treatment options.    Shortness of Breath  Associated symptoms include wheezing. Pertinent negatives include no fever.   Wheezing   Associated symptoms include shortness of breath. Pertinent negatives include no coughing or fever.       Constitution: Negative for fever.   HENT:  Positive for congestion.    Respiratory:  Positive for shortness of breath, wheezing and asthma. Negative for cough.    Allergic/Immunologic: Positive for environmental allergies, seasonal allergies and asthma.        Objective:   The physical exam was conducted virtually.  Physical Exam   Constitutional: She is oriented to person, place, and time. She does not appear ill. No distress.   HENT:   Head: Normocephalic and atraumatic.   Nose: Nose normal.   Eyes: Extraocular movement intact   Pulmonary/Chest: Effort normal.   Abdominal: Normal appearance.   Musculoskeletal: Normal range of motion.         General: Normal range of motion.   Neurological: no focal deficit. She is alert and oriented to person, place, and time.   Psychiatric: Her behavior is normal. Mood normal.   Vitals  reviewed.      Assessment:     1. Nasal congestion    2. Wheezing    3. Exacerbation of asthma, unspecified asthma severity, unspecified whether persistent        Plan:   Patient encouraged to monitor symptoms closely and instructed to follow-up for new or worsening symptoms. Further, in-person, evaluation may be necessary for continued treatment. Please follow up with your primary care doctor or specialist as needed. Verbally discussed plan. Patient confirms understanding and is in agreement with treatment and plan.     You must understand that you've received a Virtual Care evaluation only and that you may be released before all your medical problems are known or treated. You, the patient, will arrange for follow up care as instructed.      Nasal congestion  -     azelastine (ASTELIN) 137 mcg (0.1 %) nasal spray; 2 sprays (274 mcg total) by Nasal route 2 (two) times daily.  Dispense: 30 mL; Refill: 0    Wheezing  -     albuterol-ipratropium (DUO-NEB) 2.5 mg-0.5 mg/3 mL nebulizer solution; Take 3 mLs by nebulization every 4 (four) hours. Rescue  Dispense: 1620 mL; Refill: 0  -     albuterol (PROVENTIL HFA) 90 mcg/actuation inhaler; Inhale 2 puffs into the lungs every 6 (six) hours as needed for Wheezing. Rescue  Dispense: 18 g; Refill: 1  -     predniSONE (DELTASONE) 20 MG tablet; Take 1 tablet (20 mg total) by mouth once daily. for 5 days  Dispense: 5 tablet; Refill: 0  -     fluticasone furoate-vilanteroL (BREO) 200-25 mcg/dose DsDv diskus inhaler; Inhale 1 puff into the lungs once daily. Controller  Dispense: 60 each; Refill: 0    Exacerbation of asthma, unspecified asthma severity, unspecified whether persistent  -     albuterol-ipratropium (DUO-NEB) 2.5 mg-0.5 mg/3 mL nebulizer solution; Take 3 mLs by nebulization every 4 (four) hours. Rescue  Dispense: 1620 mL; Refill: 0  -     albuterol (PROVENTIL HFA) 90 mcg/actuation inhaler; Inhale 2 puffs into the lungs every 6 (six) hours as needed for Wheezing. Rescue   "Dispense: 18 g; Refill: 1  -     predniSONE (DELTASONE) 20 MG tablet; Take 1 tablet (20 mg total) by mouth once daily. for 5 days  Dispense: 5 tablet; Refill: 0  -     fluticasone furoate-vilanteroL (BREO) 200-25 mcg/dose DsDv diskus inhaler; Inhale 1 puff into the lungs once daily. Controller  Dispense: 60 each; Refill: 0      Patient Instructions   Patient Education       Asthma Discharge Instructions, Adult   About this topic   Asthma is a lung disease in which the airways are swollen and narrowed. This causes a person to have trouble breathing. Many things can cause asthma to get worse like a cold, allergies, or cold weather. It is important that you follow up with your doctor. You also need to take your asthma medicines as the doctor has ordered them. Sometimes, the doctors will give you an asthma action plan or a peak flow meter. These can help you manage your asthma.     What care is needed at home?   Ask your doctor what you need to do when you go home. Make sure you ask questions if you do not understand what the doctor says. This way you will know what you need to do.  Know how and when to take your asthma medicines. The doctor may order drugs such as:  "Quick-relief" or "rescue" drugs - These relax the muscles around the airways and help you breathe easier. They are used to relieve symptoms when they happen. Albuterol is a common example.  "Controller" medicines - These are drugs you take every day to help prevent asthma attacks. They reduce swelling of the airways. It is important to take your controller medicine every day, even when you feel well.  Do not smoke. Do not allow others to smoke near you or in the car with you. Smoke can stay on clothes and furniture and cause breathing problems.  Learn about what triggers your asthma. Stay away from those things. Common triggers are exercise; allergens, such as dust, pollens, or pet hair; and scents from cleaning products, detergents, or perfumes.  Know if " you need an extra dose of your quick relief inhaler before you exercise. If you have an inhaler to use when you are feeling short of breath, be sure to carry it with you.  Follow your asthma action plan if you have one. Use your peak flow meter if the doctor has ordered one for you. The peak flow meter helps measure how well your lungs are working.     What follow-up care is needed?   Your doctor may ask you to make visits to the office to check on your progress. Be sure to keep these visits.  What drugs may be needed?   The doctor may order drugs to:  Relax the muscles around your airways. This is a quick-acting drug that will help you breathe easier. These are your rescue drugs.  Prevent an asthma attack. These drugs reduce swelling of the airways in your lungs. These are your controller drugs.  Follow your Asthma Action Plan to know what drugs to take.       Will physical activity be limited?   If your asthma is well controlled, you should not need to limit your activity. Talk to your doctor if you are having trouble during your everyday activities.  What problems could happen?   Constant coughing  Trouble breathing  Decreased lung function  What can be done to prevent this health problem?   Asthma cannot be prevented. You can work to prevent asthma attacks. Here are some things that may help:  Learn about what triggers your asthma. Stay away from those things. Common triggers are dust and pollens; scents from candles, detergents, or perfumes; and pet hair.  Do not smoke. Do not allow others to smoke near you or in the car with you. Smoke can linger on clothes and furniture and cause breathing problems.  Treat cough and colds. These can start an asthma attack.   Make sure you get a flu shot each year.  When do I need to call the doctor?   You have extreme trouble breathing, such as you cannot speak in full sentences, you are very tired from working to catch your breath, or you are sweating from trying to  breathe.  You have trouble breathing when talking or sitting still.  Your asthma flare-up is not getting better even though you have used your inhaler a few times.  If you have to use your quick-relief inhaler 2 to 3 times in a week to treat symptoms (not to prevent symptoms when you exercise).  You are not able to do your normal activities because of your breathing.  Your cough gets worse or you start to cough up yellow or green mucus.  You start to run low on your asthma medicines.  Teach Back: Helping You Understand   The Teach Back Method helps you understand the information we are giving you. After you talk with the staff, tell them in your own words what you learned. This helps to make sure the staff has described each thing clearly. It also helps to explain things that may have been confusing. Before going home, make sure you can do these:  I can tell you about my condition.  I can tell you the difference between a rescue drug and a controller drug.  I can tell you what I will do if it has been 15 minutes since my last treatment and I am not breathing any better.  I can tell you what I will do if I am using my rescue inhaler 2 to 3 times in one week.  Where can I learn more?   American Academy of Family Physicians  https://familydoctor.org/asthma-action-plan/   Health Direct  https://www.healthdirect.gov.au/asthma   NHS Choices  https://www.nhs.uk/conditions/asthma/   Last Reviewed Date   2021-06-08  Consumer Information Use and Disclaimer   This information is not specific medical advice and does not replace information you receive from your health care provider. This is only a brief summary of general information. It does NOT include all information about conditions, illnesses, injuries, tests, procedures, treatments, therapies, discharge instructions or life-style choices that may apply to you. You must talk with your health care provider for complete information about your health and treatment options.  This information should not be used to decide whether or not to accept your health care providers advice, instructions or recommendations. Only your health care provider has the knowledge and training to provide advice that is right for you.  Copyright   Copyright © 2021 UpToDate, Inc. and its affiliates and/or licensors. All rights reserved.                     [1]   Current Outpatient Medications on File Prior to Visit   Medication Sig Dispense Refill    VI[facility] AEROSOL DELIVERY SYSTEM Fanny USE AS DIRECTED WITH SOLUTION      [DISCONTINUED] albuterol (PROVENTIL HFA) 90 mcg/actuation inhaler Inhale 2 puffs into the lungs every 6 (six) hours as needed for Wheezing. Rescue 18 g 1    [DISCONTINUED] albuterol-ipratropium (DUO-NEB) 2.5 mg-0.5 mg/3 mL nebulizer solution Take 3 mLs by nebulization every 4 (four) hours. Rescue 1620 mL 0     No current facility-administered medications on file prior to visit.

## 2025-04-05 ENCOUNTER — HOSPITAL ENCOUNTER (EMERGENCY)
Facility: HOSPITAL | Age: 35
Discharge: HOME | End: 2025-04-05
Attending: EMERGENCY MEDICINE
Payer: COMMERCIAL

## 2025-04-05 VITALS
OXYGEN SATURATION: 96 % | HEART RATE: 109 BPM | TEMPERATURE: 98 F | HEIGHT: 63 IN | DIASTOLIC BLOOD PRESSURE: 101 MMHG | WEIGHT: 240 LBS | SYSTOLIC BLOOD PRESSURE: 162 MMHG | RESPIRATION RATE: 18 BRPM | BODY MASS INDEX: 42.52 KG/M2

## 2025-04-05 DIAGNOSIS — R06.02 SHORTNESS OF BREATH: ICD-10-CM

## 2025-04-05 DIAGNOSIS — J45.901 MODERATE ASTHMA WITH EXACERBATION, UNSPECIFIED WHETHER PERSISTENT: Primary | ICD-10-CM

## 2025-04-05 LAB
HCV AB SERPL QL IA: NORMAL
HIV 1+2 AB+HIV1 P24 AG SERPL QL IA: NORMAL
OHS QRS DURATION: 76 MS
OHS QTC CALCULATION: 448 MS

## 2025-04-05 PROCEDURE — 93005 ELECTROCARDIOGRAM TRACING: CPT

## 2025-04-05 PROCEDURE — 87389 HIV-1 AG W/HIV-1&-2 AB AG IA: CPT | Performed by: PHYSICIAN ASSISTANT

## 2025-04-05 PROCEDURE — 96374 THER/PROPH/DIAG INJ IV PUSH: CPT

## 2025-04-05 PROCEDURE — 63600175 PHARM REV CODE 636 W HCPCS: Mod: JZ,TB | Performed by: EMERGENCY MEDICINE

## 2025-04-05 PROCEDURE — 94761 N-INVAS EAR/PLS OXIMETRY MLT: CPT

## 2025-04-05 PROCEDURE — 99285 EMERGENCY DEPT VISIT HI MDM: CPT | Mod: 25

## 2025-04-05 PROCEDURE — 86803 HEPATITIS C AB TEST: CPT | Performed by: PHYSICIAN ASSISTANT

## 2025-04-05 PROCEDURE — 25000242 PHARM REV CODE 250 ALT 637 W/ HCPCS: Performed by: EMERGENCY MEDICINE

## 2025-04-05 PROCEDURE — 94640 AIRWAY INHALATION TREATMENT: CPT | Mod: XB

## 2025-04-05 PROCEDURE — 93010 ELECTROCARDIOGRAM REPORT: CPT | Mod: ,,, | Performed by: INTERNAL MEDICINE

## 2025-04-05 RX ORDER — IPRATROPIUM BROMIDE AND ALBUTEROL SULFATE 2.5; .5 MG/3ML; MG/3ML
3 SOLUTION RESPIRATORY (INHALATION)
Status: COMPLETED | OUTPATIENT
Start: 2025-04-05 | End: 2025-04-05

## 2025-04-05 RX ORDER — METHYLPREDNISOLONE SOD SUCC 125 MG
125 VIAL (EA) INJECTION
Status: COMPLETED | OUTPATIENT
Start: 2025-04-05 | End: 2025-04-05

## 2025-04-05 RX ORDER — PREDNISONE 20 MG/1
40 TABLET ORAL DAILY
Qty: 10 TABLET | Refills: 0 | Status: SHIPPED | OUTPATIENT
Start: 2025-04-05 | End: 2025-04-10

## 2025-04-05 RX ORDER — FLUTICASONE FUROATE AND VILANTEROL 200; 25 UG/1; UG/1
1 POWDER RESPIRATORY (INHALATION)
Status: COMPLETED | OUTPATIENT
Start: 2025-04-05 | End: 2025-04-05

## 2025-04-05 RX ORDER — IPRATROPIUM BROMIDE AND ALBUTEROL SULFATE 2.5; .5 MG/3ML; MG/3ML
3 SOLUTION RESPIRATORY (INHALATION)
Status: DISPENSED | OUTPATIENT
Start: 2025-04-05 | End: 2025-04-05

## 2025-04-05 RX ADMIN — IPRATROPIUM BROMIDE AND ALBUTEROL SULFATE 3 ML: 2.5; .5 SOLUTION RESPIRATORY (INHALATION) at 07:04

## 2025-04-05 RX ADMIN — IPRATROPIUM BROMIDE AND ALBUTEROL SULFATE 3 ML: 2.5; .5 SOLUTION RESPIRATORY (INHALATION) at 05:04

## 2025-04-05 RX ADMIN — METHYLPREDNISOLONE SODIUM SUCCINATE 125 MG: 125 INJECTION, POWDER, FOR SOLUTION INTRAMUSCULAR; INTRAVENOUS at 06:04

## 2025-04-05 RX ADMIN — IPRATROPIUM BROMIDE AND ALBUTEROL SULFATE 3 ML: 2.5; .5 SOLUTION RESPIRATORY (INHALATION) at 11:04

## 2025-04-05 RX ADMIN — FLUTICASONE FUROATE AND VILANTEROL TRIFENATATE 1 PUFF: 200; 25 POWDER RESPIRATORY (INHALATION) at 11:04

## 2025-04-05 RX ADMIN — IPRATROPIUM BROMIDE AND ALBUTEROL SULFATE 3 ML: 2.5; .5 SOLUTION RESPIRATORY (INHALATION) at 06:04

## 2025-04-05 NOTE — ED PROVIDER NOTES
Encounter Date: 4/5/2025       History     Chief Complaint   Patient presents with    Shortness of Breath     Shortness of breath for the last day and half, home breathing treatments not working, hx of asthma      HPI    Patient is a 34-year-old female with past medical history anemia, asthma that is presenting for shortness of breath.  Patient states that she is currently out of her Breo, began to have shortness of breath starting last night.  Patient states that she went to come in early for treatment of her asthma as she was concerned for it worsening.  Patient states that she recently complete steroid treatment for asthma exacerbation last week.  Patient states that she was doing well until last night.  The patient denies any fevers, chills, chest pains, nausea, vomiting, diarrhea.  Patient states that this is typical of her asthma exacerbation, denies any change in symptoms.  Patient states that she gave herself a breathing treatment and prior to arriving to the ED.    Review of patient's allergies indicates:  No Known Allergies  Past Medical History:   Diagnosis Date    Anemia     Asthma     Chest tightness 04/05/2024    Menorrhagia     Metabolic acidosis 04/04/2024     History reviewed. No pertinent surgical history.  Family History   Problem Relation Name Age of Onset    Asthma Father      Asthma Sister      Asthma Brother      Asthma Paternal Aunt      Asthma Paternal Uncle      Breast cancer Neg Hx      Colon cancer Neg Hx      Ovarian cancer Neg Hx       Social History[1]  Review of Systems   Constitutional:         No other system positives other than aforementioned as reported by patient       Physical Exam     Initial Vitals [04/05/25 0523]   BP Pulse Resp Temp SpO2   (!) 197/90 (!) 118 (!) 21 98.3 °F (36.8 °C) 97 %      MAP       --         Physical Exam    Vitals reviewed.  Constitutional: She appears well-developed and well-nourished. She is not diaphoretic. No distress.   Well-appearing 34-year-old  female, no distress, appropriately conversational   Cardiovascular:  Regular rhythm, normal heart sounds and intact distal pulses.     Exam reveals no gallop and no friction rub.       No murmur heard.  Tachycardia noted   Pulmonary/Chest: No respiratory distress. She has wheezes. She has no rales.   No respiratory distress.  Patient mildly tachypneic.  Patient is able to speak in full sentences.  On auscultation patient has inspiratory and expiratory wheeze.   Abdominal: Abdomen is soft. Bowel sounds are normal. She exhibits no distension. There is no abdominal tenderness. There is no rebound and no guarding.   Musculoskeletal:         General: No edema.     Neurological: She is alert and oriented to person, place, and time. She has normal strength. GCS score is 15.   Skin: Skin is warm and dry. No rash noted. No erythema. No pallor.         ED Course   Procedures  Labs Reviewed   HEPATITIS C ANTIBODY   HIV 1 / 2 ANTIBODY          Imaging Results    None          Medications   albuterol-ipratropium 2.5 mg-0.5 mg/3 mL nebulizer solution 3 mL (3 mLs Nebulization Given by Other 4/5/25 4065)   albuterol-ipratropium 2.5 mg-0.5 mg/3 mL nebulizer solution 3 mL (has no administration in time range)   methylPREDNISolone sodium succinate injection 125 mg (125 mg Intravenous Given 4/5/25 5145)     Medical Decision Making  1. Differential Diagnosis includes:  Acute on chronic asthma exacerbation    2. Co Morbidities include:  History of asthma   Increased patient risk:  Previous hospitalizations for asthma      3. External notes reviewed:  Previous ED notes      4. History sources independently obtained include: n/a      5. Discussion of management with: n/a      6. Independent intrepretation of tests include: n/a      7. Diagnostic tests or therapies considered but not ordered: n/a      8. Social determinants of health: n/a      9. Shared decision making includes:  Patient is a 34-year-old female presenting for acute on  chronic asthma exacerbation.  Patient has been handed off to oncoming physician team pending re-evaluation and symptomatic control of asthma.  Patient otherwise agrees with treatment and plan.      Risk  Prescription drug management.                                      Clinical Impression:  Final diagnoses:  [R06.02] Shortness of breath  [J45.901] Moderate asthma with exacerbation, unspecified whether persistent (Primary)                   [1]   Social History  Tobacco Use    Smoking status: Former     Types: Cigars     Start date: 1/1/2019     Quit date: 1/1/2022     Years since quitting: 3.2     Passive exposure: Current    Smokeless tobacco: Never   Substance Use Topics    Alcohol use: Yes     Comment: special occasions    Drug use: Not Currently     Comment: marijuana in past        Gerson Vizcaino MD  04/05/25 0653

## 2025-04-05 NOTE — PROVIDER PROGRESS NOTES - EMERGENCY DEPT.
Encounter Date: 4/5/2025    ED Physician Progress Notes        Physician Note:   I have assumed care for this patient from  GOPI Vizcaino.  I have discussed care with the previous attending.   Briefly 33 y/o F with h/o asthma presents with SOB. Was given rx for breo but cannot afford it.  At time of sign out plan of nebs and reassessment with likely discharge to home.    7:45 AM  Pt currently getting nebs- already feeling better. Will reassess    9:05  Feeling butter but still bronchospastic on exam Breo orrdered    11:41 AM  Has gotten breo. Much improved air movement. Repeat neb given prior to discharge. Pt much improved. Discharge home 5d prednisone. Follow up PCP. Routine return precautions

## 2025-04-05 NOTE — ED TRIAGE NOTES
Molly Aly, a 34 y.o. female presents to the ED w/ complaint of SOB beginning around 3am. Pt reports increasing wheezing over the past 2 days, but worse this morning. Pt did home nebs without relief. Pt has a hx of asthma. Pt denies chest pain but endorses chest tightness. Pt is AAOx4, GCS 15.     Triage note:  Chief Complaint   Patient presents with    Shortness of Breath     Shortness of breath for the last day and half, home breathing treatments not working, hx of asthma      Review of patient's allergies indicates:  No Known Allergies  Past Medical History:   Diagnosis Date    Anemia     Asthma     Chest tightness 04/05/2024    Menorrhagia     Metabolic acidosis 04/04/2024

## 2025-04-05 NOTE — DISCHARGE INSTRUCTIONS
Use breo as prescribed.  Take prednisone as prescribed  Follow up with your doctor  Return to ED for shortness of breath, chest pain or any other concerns

## 2025-05-15 ENCOUNTER — OFFICE VISIT (OUTPATIENT)
Dept: PULMONOLOGY | Facility: CLINIC | Age: 35
End: 2025-05-15
Payer: COMMERCIAL

## 2025-05-15 ENCOUNTER — LAB VISIT (OUTPATIENT)
Dept: LAB | Facility: HOSPITAL | Age: 35
End: 2025-05-15
Attending: INTERNAL MEDICINE
Payer: COMMERCIAL

## 2025-05-15 VITALS
BODY MASS INDEX: 44.5 KG/M2 | WEIGHT: 251.13 LBS | HEIGHT: 63 IN | SYSTOLIC BLOOD PRESSURE: 140 MMHG | DIASTOLIC BLOOD PRESSURE: 88 MMHG | OXYGEN SATURATION: 99 % | HEART RATE: 102 BPM

## 2025-05-15 DIAGNOSIS — J45.40 MODERATE PERSISTENT ASTHMA WITHOUT COMPLICATION: ICD-10-CM

## 2025-05-15 DIAGNOSIS — J68.3 REACTIVE AIRWAYS DYSFUNCTION SYNDROME: Primary | ICD-10-CM

## 2025-05-15 DIAGNOSIS — J68.3 REACTIVE AIRWAYS DYSFUNCTION SYNDROME: ICD-10-CM

## 2025-05-15 LAB
ABSOLUTE EOSINOPHIL (OHS): 0.56 K/UL
ABSOLUTE MONOCYTE (OHS): 0.49 K/UL (ref 0.3–1)
ABSOLUTE NEUTROPHIL COUNT (OHS): 2.58 K/UL (ref 1.8–7.7)
BASOPHILS # BLD AUTO: 0.05 K/UL
BASOPHILS NFR BLD AUTO: 0.9 %
ERYTHROCYTE [DISTWIDTH] IN BLOOD BY AUTOMATED COUNT: 16.5 % (ref 11.5–14.5)
HCT VFR BLD AUTO: 35.8 % (ref 37–48.5)
HGB BLD-MCNC: 10.7 GM/DL (ref 12–16)
IGE SERPL-ACNC: 392 IU/ML
IMM GRANULOCYTES # BLD AUTO: 0.01 K/UL (ref 0–0.04)
IMM GRANULOCYTES NFR BLD AUTO: 0.2 % (ref 0–0.5)
LYMPHOCYTES # BLD AUTO: 2.14 K/UL (ref 1–4.8)
MCH RBC QN AUTO: 23.7 PG (ref 27–31)
MCHC RBC AUTO-ENTMCNC: 29.9 G/DL (ref 32–36)
MCV RBC AUTO: 79 FL (ref 82–98)
NUCLEATED RBC (/100WBC) (OHS): 0 /100 WBC
PLATELET # BLD AUTO: 235 K/UL (ref 150–450)
PMV BLD AUTO: 10.1 FL (ref 9.2–12.9)
RBC # BLD AUTO: 4.51 M/UL (ref 4–5.4)
RELATIVE EOSINOPHIL (OHS): 9.6 %
RELATIVE LYMPHOCYTE (OHS): 36.7 % (ref 18–48)
RELATIVE MONOCYTE (OHS): 8.4 % (ref 4–15)
RELATIVE NEUTROPHIL (OHS): 44.2 % (ref 38–73)
WBC # BLD AUTO: 5.83 K/UL (ref 3.9–12.7)

## 2025-05-15 PROCEDURE — 99213 OFFICE O/P EST LOW 20 MIN: CPT | Mod: S$GLB,,, | Performed by: INTERNAL MEDICINE

## 2025-05-15 PROCEDURE — 99999 PR PBB SHADOW E&M-EST. PATIENT-LVL III: CPT | Mod: PBBFAC,,, | Performed by: INTERNAL MEDICINE

## 2025-05-15 PROCEDURE — 85025 COMPLETE CBC W/AUTO DIFF WBC: CPT

## 2025-05-15 PROCEDURE — 3008F BODY MASS INDEX DOCD: CPT | Mod: CPTII,S$GLB,, | Performed by: INTERNAL MEDICINE

## 2025-05-15 PROCEDURE — 82785 ASSAY OF IGE: CPT

## 2025-05-15 PROCEDURE — 3079F DIAST BP 80-89 MM HG: CPT | Mod: CPTII,S$GLB,, | Performed by: INTERNAL MEDICINE

## 2025-05-15 PROCEDURE — 3077F SYST BP >= 140 MM HG: CPT | Mod: CPTII,S$GLB,, | Performed by: INTERNAL MEDICINE

## 2025-05-15 PROCEDURE — 1159F MED LIST DOCD IN RCRD: CPT | Mod: CPTII,S$GLB,, | Performed by: INTERNAL MEDICINE

## 2025-05-15 PROCEDURE — 36415 COLL VENOUS BLD VENIPUNCTURE: CPT

## 2025-05-15 RX ORDER — PREDNISONE 20 MG/1
40 TABLET ORAL DAILY
Qty: 10 TABLET | Refills: 0 | Status: SHIPPED | OUTPATIENT
Start: 2025-05-15 | End: 2025-05-20
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
## 2025-05-15 NOTE — ASSESSMENT & PLAN NOTE
Increasing to triple therapy and prescribing prednisone to have on for backup.    Ordered IgE and CBC today in anticipation of needing to escalate to biologic therapy at the next appointment.  Also planning an ENT referral to evaluate for LPR given that her symptoms seem to be worse at night

## 2025-05-19 DIAGNOSIS — R06.2 WHEEZING: ICD-10-CM

## 2025-05-19 DIAGNOSIS — J45.901 EXACERBATION OF ASTHMA, UNSPECIFIED ASTHMA SEVERITY, UNSPECIFIED WHETHER PERSISTENT: ICD-10-CM

## 2025-05-20 ENCOUNTER — TELEPHONE (OUTPATIENT)
Dept: INTERNAL MEDICINE | Facility: CLINIC | Age: 35
End: 2025-05-20
Payer: COMMERCIAL

## 2025-05-21 ENCOUNTER — OFFICE VISIT (OUTPATIENT)
Dept: INTERNAL MEDICINE | Facility: CLINIC | Age: 35
End: 2025-05-21
Payer: COMMERCIAL

## 2025-05-21 VITALS
HEART RATE: 86 BPM | HEIGHT: 63 IN | DIASTOLIC BLOOD PRESSURE: 98 MMHG | SYSTOLIC BLOOD PRESSURE: 144 MMHG | OXYGEN SATURATION: 98 % | WEIGHT: 254.44 LBS | BODY MASS INDEX: 45.08 KG/M2

## 2025-05-21 DIAGNOSIS — J45.40 MODERATE PERSISTENT ASTHMA WITHOUT COMPLICATION: ICD-10-CM

## 2025-05-21 DIAGNOSIS — D50.9 MICROCYTIC ANEMIA: ICD-10-CM

## 2025-05-21 DIAGNOSIS — I10 HYPERTENSION, UNSPECIFIED TYPE: ICD-10-CM

## 2025-05-21 DIAGNOSIS — Z00.00 ENCOUNTER FOR ANNUAL PHYSICAL EXAM: Primary | ICD-10-CM

## 2025-05-21 DIAGNOSIS — E66.01 MORBID OBESITY WITH BODY MASS INDEX (BMI) OF 45.0 TO 49.9 IN ADULT: ICD-10-CM

## 2025-05-21 DIAGNOSIS — K21.9 GASTROESOPHAGEAL REFLUX DISEASE, UNSPECIFIED WHETHER ESOPHAGITIS PRESENT: ICD-10-CM

## 2025-05-21 PROCEDURE — 99999 PR PBB SHADOW E&M-EST. PATIENT-LVL IV: CPT | Mod: PBBFAC,,, | Performed by: STUDENT IN AN ORGANIZED HEALTH CARE EDUCATION/TRAINING PROGRAM

## 2025-05-21 RX ORDER — ALBUTEROL SULFATE 90 UG/1
2 INHALANT RESPIRATORY (INHALATION) EVERY 6 HOURS PRN
Qty: 18 G | Refills: 1 | Status: SHIPPED | OUTPATIENT
Start: 2025-05-21 | End: 2025-07-20

## 2025-05-21 RX ORDER — AMLODIPINE BESYLATE 5 MG/1
5 TABLET ORAL DAILY
Qty: 90 TABLET | Refills: 3 | Status: SHIPPED | OUTPATIENT
Start: 2025-05-21 | End: 2026-05-21

## 2025-05-21 NOTE — ASSESSMENT & PLAN NOTE
Chronic, intermittent  Sometimes related to hot sauces  Sometimes when not eating  Not very frequent  Takes Julianna borges which helps  Discussed about trial of Pepcid, however patient states that the symptoms are not frequent.  Continue to monitor at this time

## 2025-05-21 NOTE — ASSESSMENT & PLAN NOTE
Aerobic exercise 150 minutes per week.  Avoid soda, simple sugars, sweets, excessive rice, pasta, potatoes or bread.   Choose brown options when available and portion control.  Limit fast foods and fried foods and processed foods.    Choose complex carbs in moderation (ex: green leafy vegetables, starchy vegetables beans, oatmeal).  Eat plenty of fresh fruits and vegetables with lean meats daily.   Consider permanent healthy lifestyle changes.    Reassess sleep apnea next visit

## 2025-05-21 NOTE — PROGRESS NOTES
Patient ID: Molly Aly is a 34 y.o. female.  Chief Complaint: Establish Care    Subjective:   History of Present Illness    CHIEF COMPLAINT:  Patient presents today for follow up and to switch PCPs    RESPIRATORY:  She has a history of asthma with nocturnal symptoms including wheezing and shortness of breath requiring inhaler use. She denies daytime fatigue or feeling unrested. She follows with pulmonologist Dr. Fatou Stevens with recent appointment last week. Current medications include albuterol inhaler PRN, DuoNeb nebulizer PRN, glycopyrrolate/formoterol inhaler two puffs twice daily, and prednisone for exacerbations (denies recent use). Family history includes asthma in father, sister, and brother.    GERD:  She experiences nocturnal acid reflux symptoms with persistent fullness throughout the day, including upon waking. Symptoms include upper chest discomfort and nausea without emesis. Symptoms have decreased in frequency from daily to intermittent, with saucy and tomato-based foods identified as triggers. Previously used Julianna-Barling without relief when symptoms were more severe.    GYNECOLOGIC:  She has a history of heavy menstrual periods, previously requiring tampon changes every two hours. Current periods remain 'regular heavy' but improved from previous severity. Menstrual cycle began yesterday.    HEMATOLOGIC:  Current hemoglobin is 10. She has been taking OTC iron supplements consistently for past 6 weeks.    DIET AND EXERCISE:  She began exercising at the gym one month ago, primarily using treadmill. Diet consists mainly of home-cooked meals including chicken and salmon, avoiding fried foods and sodas.    SOCIAL HISTORY:  She is single with no children. Former marijuana smoker, denies current use. Reports occasional alcohol consumption of 2-3 drinks during special occasions.      ROS:  General: -fever, -chills, -fatigue, -weight gain, -weight loss  Eyes: -vision changes, -redness, -discharge  ENT:  "-ear pain, -nasal congestion, -sore throat  Cardiovascular: -chest pain, -palpitations, -lower extremity edema  Respiratory: -cough, +shortness of breath, +wheezing, +snoring  Gastrointestinal: -abdominal pain, -nausea, -vomiting, -diarrhea, -constipation, -blood in stool, +heartburn  Genitourinary: -dysuria, -hematuria, -frequency  Musculoskeletal: -joint pain, -muscle pain  Skin: -rash, -lesion  Neurological: -headache, -dizziness, -numbness, -tingling  Psychiatric: -anxiety, -depression, -sleep difficulty  Female Genitourinary: +abnormal menses             Objective:   BP (!) 144/98 (BP Location: Left arm, Patient Position: Sitting)   Pulse 86   Ht 5' 3" (1.6 m)   Wt 115.4 kg (254 lb 6.6 oz)   LMP 05/20/2025 (Exact Date)   SpO2 98%   BMI 45.07 kg/m²      Physical Exam    Vitals: Blood pressure elevated:  140/98  General: No acute distress. Well-developed. Well-nourished.  Eyes: EOMI. Sclerae anicteric.  HENT: Normocephalic. Atraumatic.Moist oral mucosa.  Ears: Bilateral TMs clear. Bilateral EACs clear.  Cardiovascular: Regular rate. Regular rhythm. No murmurs. No rubs. No gallops. Normal S1, S2.  Respiratory: Normal respiratory effort. Clear to auscultation bilaterally. No rales. No rhonchi. No wheezing.  Abdomen: Soft. Non-tender. Non-distended.   Musculoskeletal: No  obvious deformity.  Extremities: No lower extremity edema.  Neurological: Alert.  No slurred speech. Normal gait.  Psychiatric: Normal mood. Normal affect. Good insight. Good judgment.  Skin: Warm. Dry. No rash.           Assessment:       1. Encounter for annual physical exam    2. Hypertension, unspecified type    3. Moderate persistent asthma without complication    4. Microcytic anemia    5. Morbid obesity with body mass index (BMI) of 45.0 to 49.9 in adult    6. Gastroesophageal reflux disease, unspecified whether esophagitis present           Plan:         1. Encounter for annual physical exam  -     Lipid Panel; Future; Expected date: " 05/21/2025  -     Hemoglobin A1C; Future; Expected date: 05/21/2025    2. Hypertension, unspecified type  Assessment & Plan:  Chronic, uncontrolled  Blood pressure today 144/98  Patient eating fairly healthy  Recently started gym  Had elevated blood pressures previously but was attributed to breathing treatments and steroids  Blood pressure remains elevated he is office visit  We will start low-dose amlodipine 5 mg daily  Discussed lifestyle changes as below  Follow up in 4 weeks    Orders:  -     amLODIPine (NORVASC) 5 MG tablet; Take 1 tablet (5 mg total) by mouth once daily.  Dispense: 90 tablet; Refill: 3    3. Moderate persistent asthma without complication  Assessment & Plan:  Seeing Dr. Porsche Stevens  Currently on triple therapy and  prednisone to have on for backup.    Was referred ENT  to evaluate for LPR given that her symptoms seem to be worse at night  Continue home inhalers budesonide-glycopyrrolate-formoterol inhaler twice daily, albuterol inhaler as needed, DuoNeb as needed  Continue follow up with pulmonology    Orders:  -     albuterol (PROVENTIL HFA) 90 mcg/actuation inhaler; Inhale 2 puffs into the lungs every 6 (six) hours as needed for Wheezing. Rescue  Dispense: 18 g; Refill: 1    4. Microcytic anemia  Overview:  History of heavy menstrual cycles per patient.   No lightheadedness, SOB, fatigue.  Iron panel with low iron (18), low ferritin, elevated TIBC.  On iron supplement daily.  Assessed by OBGYN, they are discussing oral contraception options.     Assessment & Plan:  Reports of heavy menstrual periods in the past  With iron-deficiency in 2024  Patient restarted iron supplements consistently a month and a half ago  Hemoglobin 10.7 with microcytosis on most recent CBC on 05/15  Recheck iron studies    Orders:  -     Iron and TIBC; Future; Expected date: 05/21/2025  -     Ferritin; Future; Expected date: 05/21/2025    5. Morbid obesity with body mass index (BMI) of 45.0 to 49.9 in  adult  Assessment & Plan:  Aerobic exercise 150 minutes per week.  Avoid soda, simple sugars, sweets, excessive rice, pasta, potatoes or bread.   Choose brown options when available and portion control.  Limit fast foods and fried foods and processed foods.    Choose complex carbs in moderation (ex: green leafy vegetables, starchy vegetables beans, oatmeal).  Eat plenty of fresh fruits and vegetables with lean meats daily.   Consider permanent healthy lifestyle changes.    Reassess sleep apnea next visit      6. Gastroesophageal reflux disease, unspecified whether esophagitis present  Assessment & Plan:  Chronic, intermittent  Sometimes related to hot sauces  Sometimes when not eating  Not very frequent  Takes Julianna borges which helps  Discussed about trial of Pepcid, however patient states that the symptoms are not frequent.  Continue to monitor at this time          Health Maintenance   You are up to date for your primary preventive health care, and there are no reminders at this time.     Follow up   No follow-ups on file.      This note was generated with the assistance of ambient listening technology. Verbal consent was obtained by the patient and accompanying visitor(s) for the recording of patient appointment to facilitate this note. I attest to having reviewed and edited the generated note for accuracy, though some syntax or spelling errors may persist. Please contact the author of this note for any clarification.           Bianca Michelle MD  [id number],  [place] [address]  Ph: [phone]

## 2025-05-21 NOTE — ASSESSMENT & PLAN NOTE
Chronic, uncontrolled  Blood pressure today 144/98  Patient eating fairly healthy  Recently started gym  Had elevated blood pressures previously but was attributed to breathing treatments and steroids  Blood pressure remains elevated he is office visit  We will start low-dose amlodipine 5 mg daily  Discussed lifestyle changes as below  Follow up in 4 weeks

## 2025-05-21 NOTE — ASSESSMENT & PLAN NOTE
Seeing Dr. Porsche Stevens  Currently on triple therapy and  prednisone to have on for backup.    Was referred ENT  to evaluate for LPR given that her symptoms seem to be worse at night  Continue home inhalers budesonide-glycopyrrolate-formoterol inhaler twice daily, albuterol inhaler as needed, DuoNeb as needed  Continue follow up with pulmonology

## 2025-05-22 RX ORDER — ALBUTEROL SULFATE 90 UG/1
2 INHALANT RESPIRATORY (INHALATION) EVERY 6 HOURS PRN
Qty: 18 G | Refills: 1 | Status: SHIPPED | OUTPATIENT
Start: 2025-05-22 | End: 2025-07-21

## 2025-07-10 ENCOUNTER — OFFICE VISIT (OUTPATIENT)
Dept: OTOLARYNGOLOGY | Facility: CLINIC | Age: 35
End: 2025-07-10
Payer: COMMERCIAL

## 2025-07-10 DIAGNOSIS — R05.3 CHRONIC COUGH: Primary | ICD-10-CM

## 2025-07-10 DIAGNOSIS — J68.3 REACTIVE AIRWAYS DYSFUNCTION SYNDROME: ICD-10-CM

## 2025-07-10 PROCEDURE — 31575 DIAGNOSTIC LARYNGOSCOPY: CPT | Mod: S$GLB,,, | Performed by: OTOLARYNGOLOGY

## 2025-07-10 PROCEDURE — 99999 PR PBB SHADOW E&M-EST. PATIENT-LVL I: CPT | Mod: PBBFAC,,, | Performed by: OTOLARYNGOLOGY

## 2025-07-10 PROCEDURE — 99203 OFFICE O/P NEW LOW 30 MIN: CPT | Mod: 25,S$GLB,, | Performed by: OTOLARYNGOLOGY

## 2025-07-16 ENCOUNTER — TELEPHONE (OUTPATIENT)
Dept: INTERNAL MEDICINE | Facility: CLINIC | Age: 35
End: 2025-07-16
Payer: COMMERCIAL

## 2025-07-23 NOTE — PROGRESS NOTES
Ear, Nose, & Throat  Otolaryngology - Head & Neck Surgery    Summary of Visit:  Molly Aly was referred to me by Dr. Porsche Stevens in consultation for reactive airways disease    Subjective:     Chief Complaint: No chief complaint on file.      Molly Aly is a 34 y.o. female who was referred to me by Dr. Porsche Stevens in consultation for reactive airways disease.  She has a history of persistent cough of many months duration.  She was initially diagnosed with a reactive airways disease and initiated on inhaled albuterol rescue inhaler as well as a combined steroid long-term bronchodilator.  She has noted considerable improvement in symptoms since initiating these therapies.  She denies any dysphagia, dysphonia, aspiration symptoms.    Past Medical History  Active Ambulatory Problems     Diagnosis Date Noted    Moderate persistent asthma without complication 02/23/2022    Microcytic anemia 04/04/2024    Morbid obesity with body mass index (BMI) of 45.0 to 49.9 in adult 04/04/2024    Hypertension 05/21/2025    Acid reflux 05/21/2025     Resolved Ambulatory Problems     Diagnosis Date Noted    Metabolic acidosis 04/04/2024    Chest tightness 04/05/2024     Past Medical History:   Diagnosis Date    Anemia     Asthma     Menorrhagia        Past Surgical History  She has no past surgical history on file.    No past surgical history on file.     Family History  Her family history includes Asthma in her brother, father, paternal aunt, paternal uncle, and sister; No Known Problems in her maternal grandfather, maternal grandmother, mother, paternal grandfather, and paternal grandmother.    Social History  She reports that she has never smoked. She has been exposed to tobacco smoke. She has never used smokeless tobacco. She reports current alcohol use. She reports that she does not currently use drugs after having used the following drugs: Marijuana.    Allergies  She has no known allergies.    Medications  She has a  current medication list which includes the following prescription(s): albuterol, albuterol-ipratropium, amlodipine, azelastine, budesonide-glycopyr-formoterol, and vios aerosol delivery system.    ROS:  Pertinent positive and negative review of systems as noted in HPI.     Objective:     There were no vitals taken for this visit.   General Appearance:   Awake, Alert and Oriented. NAD. Appropriate affect and appearance      Neuro:   Spontaneous eye opening, appropriate verbal responses, follows commands  Pupils equal, round & brisk. EOMI, no proptosis  Face is symmetric, HB I, non-edematous bilaterally  Vision grossly intact, Hearing grossly intact     Head and Face:   skin is intact with no lesions noted.  Parotid and submandibular glands are symmetric and non-tender.      Ears:  Periauricular regions non-erythematous, non-fluctuanct non-tender  Pinna normal bilaterally, no skin lesions  EACs patent and without drainage bilaterally   Tympanic membranes are normal in appearance bilaterally.  No middle ear effusion noted bilaterally.    Nose:   External nose is symmetric, no skin lesions  Septum midline, No inferior turbinate hypertrophy, No polyps or rhinorrhea     OC/OP:  Tongue midline on extension, non-edematous, soft  No labial, buccal, oral tongue or floor of mouth lesions  Soft palate symmetric, midline and without lesions or masses, tonsils symmetric  No masses or lesions of the visualized oropharynx     Neck:  Neck is symmetric, non-edematous, non-erythematous  Trachea is midline and easily palpable,  No palpable adenopathy or masses in levels I-VI  No thyroid nodules or masses, non-tender      Respiratory:  Normal work of breathing, no accessory muscle use, no stridor     Voice:  Normal vocal quality, volume and articulation    Data Review:   LABS    IMAGING        AUDIO      Procedures:   Procedure: Flexible Laryngoscopy    Date: 07/23/2025     Pre procedure Diagnosis: Chronic Cough    Post procedure  Diagnosis: same    Equipment Used: Distal Chip Video Laryngoscope    Anesthesia: Topical 4% Lidocaine and Patrice-synephrine    Nasal Side: right    Procedure:  After verbal consent was obtained, the nose was sprayed with topical anesthetic and decongestant. A flexible laryngoscope was inserted through the nose to the level of the larynx. Findings listed and photodocumentation listed below    Nasopharynx: Non-obstructive, symmetric adenoid tissue. Eustachian tubes orifices patent. Symmetric soft palate elevation  Oropharynx: Symmetric base of tongue, No concerning lesions or mass of valleculae, posterior pharynx, base of tongue or tonsils  Hypopharynx: No concerning lesions/masses of pyriform sinuses. No pooling secretions.   Epiglottis: Crisp  Larynx: True vocal folds mobile bilaterally, no masses or lesions of the endolarynx, no erythema, subglottis adequately visualized. No signs of stenosis or masses.     Outcome: The patient tolerated the procedure well and without complaint.     Assessment:     1. Reactive airways dysfunction syndrome        Plan:     I had a long discussion with the patient regarding her condition and the further workup and management options.  No significant abnormalities are noted on exam today.  The fact that she has responded well to reactive airways disease management suggests the she has a large element of cough variant asthma as her etiology.  I will be happy to see her in follow up if symptoms of the upper airway arise    No orders of the defined types were placed in this encounter.         Problem List Items Addressed This Visit    None  Visit Diagnoses         Reactive airways dysfunction syndrome